# Patient Record
Sex: FEMALE | Race: WHITE | NOT HISPANIC OR LATINO | Employment: FULL TIME | URBAN - METROPOLITAN AREA
[De-identification: names, ages, dates, MRNs, and addresses within clinical notes are randomized per-mention and may not be internally consistent; named-entity substitution may affect disease eponyms.]

---

## 2017-03-16 ENCOUNTER — OFFICE VISIT (OUTPATIENT)
Dept: URGENT CARE | Facility: CLINIC | Age: 37
End: 2017-03-16
Payer: COMMERCIAL

## 2017-03-16 VITALS
WEIGHT: 183 LBS | OXYGEN SATURATION: 98 % | HEART RATE: 80 BPM | DIASTOLIC BLOOD PRESSURE: 80 MMHG | TEMPERATURE: 98.5 F | BODY MASS INDEX: 34.55 KG/M2 | SYSTOLIC BLOOD PRESSURE: 110 MMHG | HEIGHT: 61 IN

## 2017-03-16 DIAGNOSIS — J02.9 PHARYNGITIS, UNSPECIFIED ETIOLOGY: ICD-10-CM

## 2017-03-16 DIAGNOSIS — J06.9 UPPER RESPIRATORY TRACT INFECTION, UNSPECIFIED TYPE: ICD-10-CM

## 2017-03-16 DIAGNOSIS — R05.9 COUGH: ICD-10-CM

## 2017-03-16 LAB
INT CON NEG: NEGATIVE
INT CON POS: POSITIVE
S PYO AG THROAT QL: NEGATIVE

## 2017-03-16 PROCEDURE — 99214 OFFICE O/P EST MOD 30 MIN: CPT | Performed by: PHYSICIAN ASSISTANT

## 2017-03-16 PROCEDURE — 87880 STREP A ASSAY W/OPTIC: CPT | Performed by: PHYSICIAN ASSISTANT

## 2017-03-16 RX ORDER — PROMETHAZINE HYDROCHLORIDE AND CODEINE PHOSPHATE 6.25; 1 MG/5ML; MG/5ML
5 SYRUP ORAL EVERY 12 HOURS PRN
Qty: 120 ML | Refills: 0 | Status: SHIPPED | OUTPATIENT
Start: 2017-03-16 | End: 2018-02-22

## 2017-03-16 ASSESSMENT — ENCOUNTER SYMPTOMS
DIARRHEA: 0
SHORTNESS OF BREATH: 0
ABDOMINAL PAIN: 0
FEVER: 0
SPUTUM PRODUCTION: 0
CHILLS: 0
SORE THROAT: 1
NAUSEA: 0
WHEEZING: 0
VOMITING: 0
COUGH: 1

## 2017-03-16 NOTE — PROGRESS NOTES
Subjective:      Bree Ggae is a 36 y.o. female who presents with Sore Throat            HPI  2wks ago w/ strep, dx'ed by rash then swab at allergist, tx'ed w/ augmentin x 5-7d, notes finished augmentin ~12d ago, denies having had sorehtroat back then, resolved rash, notes last 6d of sorethroat -very bad sorethroat, swollen nodes, cough last 3d dry cough, c/o sinus press and green purulent drainage, denies ear pain, c/o pressure to ears, c/o nausea, some emesis last week, denies nausea/voimgting, poor appetitie, ~8yrs ago w/ strep, PMH of pneumonia/bronchitis - 8yrs ago, does have seasonal allerg - takes zyrtec daily. Qvar. Tried naproxen/tylenol/sudafed/guafinesin.       Review of Systems   Constitutional: Negative for fever and chills.   HENT: Positive for congestion, ear pain and sore throat.    Respiratory: Positive for cough. Negative for sputum production, shortness of breath and wheezing.    Gastrointestinal: Negative for nausea ( resolved), vomiting, abdominal pain and diarrhea.   Skin: Negative for rash.   Endo/Heme/Allergies: Positive for environmental allergies.       PMH:  has a past medical history of Migraine; Fracture of finger; Hemorrhoids; Rectal fissure; Varicose veins; Hyperlipidemia; Allergy; Iron deficiency anemia; IBS (irritable bowel syndrome); Alcohol addiction; Drug addiction; EBV positive mononucleosis syndrome; Hypothyroidism; Depression; Angina (last 2 years); Bronchitis; Indigestion; Arthritis (current); and LBP (low back pain). She also has no past medical history of Unspecified hemorrhagic conditions, Personal history of venous thrombosis and embolism, Cancer, Diabetes, Glaucoma, CATARACT, Stroke, Seizure, Congestive heart failure, Myocardial infarct, Hypertension, Arrhythmia, Pacemaker, Heart valve disease, Heart murmur, Rheumatic fever, Pneumonia, COPD, Jaundice, Unspecified urinary incontinence, Renal disorder, Dialysis, or Other specified symptom associated with female  genital organs.  MEDS:   Current outpatient prescriptions:   •  fluoxetine (PROZAC) 40 MG capsule, Take 1 Cap by mouth every day., Disp: 30 Cap, Rfl: 3  •  acetaminophen (TYLENOL) 500 MG TABS, Take 500-1,000 mg by mouth every 6 hours as needed. Up to 8 a day, Disp: , Rfl:   •  ondansetron (ZOFRAN ODT) 4 MG TBDP, Take 0.5 Tabs by mouth every 6 hours as needed for Nausea/Vomiting., Disp: 5 Each, Rfl: 2  •  cetirizine (ZYRTEC) 10 MG TABS, Take 10 mg by mouth every day.  , Disp: , Rfl:   •  busPIRone (BUSPAR) 10 MG TABS, Take 1 Tab by mouth 2 times a day., Disp: 60 Tab, Rfl: 3  •  aripiprazole (ABILIFY) 2 MG tablet, Take 1 Tab by mouth every day., Disp: 30 Tab, Rfl: 3  •  ondansetron (ZOFRAN ODT) 4 MG TBDP, Take 1 Tab by mouth every 8 hours as needed for Nausea/Vomiting., Disp: 12 Tab, Rfl: 0  •  tramadol (ULTRAM) 50 MG TABS, Take 1-2 Tabs by mouth every 6 hours as needed for Severe Pain., Disp: 30 Tab, Rfl: 0  •  gabapentin (NEURONTIN) 100 MG CAPS, Take 1 tab PO TID x 3 days then increase to 2 tabs PO TID x 3 days then increase to 3 tabs PO TID and remain on that dose as long as you are tolerating the medication., Disp: 90 Cap, Rfl: 3  •  hydrocodone-acetaminophen (NORCO) 5-325 MG TABS per tablet, Take 1-2 Tabs by mouth every four hours as needed., Disp: 30 Tab, Rfl: 0  •  hydrocodone/acetaminophen (NORCO)  MG TABS, Take 1-2 Tabs by mouth every 6 hours as needed., Disp: , Rfl:   •  SUMAVEL DOSEPRO 6 MG/0.5ML EMMA, Inject 1 Syringe as instructed as needed (Take at onset of headache onset. Okay to repeat if headache persists in 2 hours. DO NOT take more than 2 doses in 1 day.)., Disp: 11 Syringe, Rfl: 6  •  indomethacin (INDOCIN) 50 MG CAPS, Take 1 Cap by mouth 3 times a day, with meals., Disp: 90 Cap, Rfl: 6  •  nitrofurantoin monohydr macro (MACROBID) 100 MG CAPS, Take 100 mg by mouth 2 times a day., Disp: , Rfl:   •  naproxen (NAPROSYN) 500 MG TABS, Take 500 mg by mouth as needed., Disp: , Rfl:   •  pregabalin  "(LYRICA) 75 MG CAPS, Take 2 Caps by mouth 2 times a day., Disp: 60 Cap, Rfl: 11  •  promethazine (PHENERGAN) 25 MG TABS, Take 1 Tab by mouth every 6 hours as needed for Nausea/Vomiting., Disp: 10 Tab, Rfl: 0  ALLERGIES:   Allergies   Allergen Reactions   • Ciprofloxacin    • Diclofenac      Upset     • Epinephrine      palpitations   • Latex      SURGHX:   Past Surgical History   Procedure Laterality Date   • Tracheostomy  10/29/2009     Performed by NIDHI LARA at SURGERY [facility]   • Gastrostomy laparoscopic  10/29/2009     Performed by NIDHI LARA at SURGERY [facility]     SOCHX:  reports that she has been smoking Cigarettes.  She has a 6.5 pack-year smoking history. She does not have any smokeless tobacco history on file. She reports that she uses illicit drugs (Methamphetamines). She reports that she does not drink alcohol.  FH: Family history was reviewed, no pertinent findings to report    I have worn a mask for the entire encounter with this patient.      Objective:     /80 mmHg  Pulse 80  Temp(Src) 36.9 °C (98.5 °F)  Ht 1.549 m (5' 0.98\")  Wt 83.008 kg (183 lb)  BMI 34.60 kg/m2  SpO2 98%     Physical Exam   Constitutional: She is oriented to person, place, and time. She appears well-developed and well-nourished. No distress.   HENT:   Head: Normocephalic and atraumatic.   Right Ear: Tympanic membrane, external ear and ear canal normal.   Left Ear: Tympanic membrane, external ear and ear canal normal.   Nose: Right sinus exhibits maxillary sinus tenderness. Right sinus exhibits no frontal sinus tenderness. Left sinus exhibits maxillary sinus tenderness. Left sinus exhibits no frontal sinus tenderness.   Mouth/Throat: Uvula is midline and mucous membranes are normal. Posterior oropharyngeal edema ( trace bilat) and posterior oropharyngeal erythema ( mild PND) present. No oropharyngeal exudate or tonsillar abscesses.   Eyes: Conjunctivae and lids are normal. Right eye " exhibits no discharge. Left eye exhibits no discharge. No scleral icterus.   Neck: Neck supple.   Pulmonary/Chest: Effort normal and breath sounds normal. No respiratory distress. She has no decreased breath sounds. She has no wheezes. She has no rhonchi. She has no rales.   Musculoskeletal: Normal range of motion.   Lymphadenopathy:     She has cervical adenopathy ( mild bilat).   Neurological: She is alert and oriented to person, place, and time. She is not disoriented.   Skin: Skin is warm and dry. She is not diaphoretic. No erythema. No pallor.   Psychiatric: Her speech is normal and behavior is normal.   Nursing note and vitals reviewed.         POCT strep - NEG     Assessment/Plan:   1. Upper respiratory tract infection, unspecified type  Supportive care is reviewed with patient/caregiver - recommend to push PO fluids and electrolytes, Nsaids/tylenol, netti pot/saline irrig, humidifier in home, pt declines throat numbing medication, we discuss OTC options, cough suppressant and work note provided  Cautioned regarding potential for sedation with medication.  Return to clinic with lack of resolution or progression of symptoms.      2. Cough    - POCT Rapid Strep A  - promethazine-codeine (PHENERGAN-CODEINE) 6.25-10 MG/5ML Syrup; Take 5 mL by mouth every 12 hours as needed.  Dispense: 120 mL; Refill: 0    3. Pharyngitis, unspecified etiology

## 2017-03-16 NOTE — Clinical Note
March 16, 2017       Patient: Bree Gage   YOB: 1980   Date of Visit: 3/16/2017         To Whom It May Concern:    It is my medical opinion that Bree Gage should be excused from work for Monday, Tuesday, Wednesday and today due to illness. She is now permitted to return back to work.      If you have any questions or concerns, please don't hesitate to call [phone]          Sincerely,          Aric Landaverde PA-C  Electronically Signed

## 2017-03-16 NOTE — MR AVS SNAPSHOT
"        Bree Schaeffer Too   3/16/2017 8:15 AM   Office Visit   MRN: [id number]    Department:  [facility]   Dept Phone:  [phone]    Description:  Female : 1980   Provider:  Aric Landaverde PA-C           Reason for Visit     Sore Throat fevers,L swollen tonsil,white patches,green mucus,cough, x1week       Allergies as of 3/16/2017     Allergen Noted Reactions    Ciprofloxacin 2010       Diclofenac 2009       Upset      Epinephrine 2009       palpitations    Latex 2009         You were diagnosed with     Upper respiratory tract infection, unspecified type   [7555306]       Cough   [786.2.ICD-9-CM]       Pharyngitis, unspecified etiology   [3346297]         Vital Signs     Blood Pressure Pulse Temperature Height Weight Body Mass Index    110/80 mmHg 80 36.9 °C (98.5 °F) 1.549 m (5' 0.98\") 83.008 kg (183 lb) 34.60 kg/m2    Oxygen Saturation Smoking Status                98% Current Every Day Smoker          Basic Information     Date Of Birth Sex Race Ethnicity Preferred Language    1980 Female White Non- English      Problem List              ICD-10-CM Priority Class Noted - Resolved    Fatigue R53.83   9/3/2009 - Present    Preventative health care Z00.00   9/3/2009 - Present    IBS (irritable bowel syndrome) K58.9   Unknown - Present    Migraine G43.909   Unknown - Present    Alcohol addiction (CMS-HCC) F10.20   Unknown - Present    Drug addiction (CMS-[facility]) F19.20   Unknown - Present    Fracture of finger S62.609A   Unknown - Present    Hypothyroidism E03.9   Unknown - Present    Hemorrhoids K64.9   Unknown - Present    Rectal fissure K60.2   Unknown - Present    Varicose veins I86.8   Unknown - Present    Iron deficiency anemia D50.9   Unknown - Present    Hyperlipidemia E78.5   Unknown - Present    Low back pain M54.5   Unknown - Present    EBV positive mononucleosis syndrome B27.00   Unknown - Present    Viral syndrome B34.9   10/20/2009 - Present   " URI (upper respiratory infection) J06.9   4/7/2010 - Present    Migraine    4/22/2010 - Present    Shingles B02.9   7/6/2010 - Present    Nausea, vomiting and diarrhea R11.2, R19.7   10/7/2010 - Present    Hyperprolactinemia (CMS-HCC) E22.1   2/13/2013 - Present      Health Maintenance        Date Due Completion Dates    IMM DTaP/Tdap/Td Vaccine (1 - Tdap) 12/1/2000 ---    PAP SMEAR 12/1/2002 ---    IMM INFLUENZA (1) 9/1/2016 11/8/2010, 10/7/2009            Results     POCT Rapid Strep A      Component    Rapid Strep Screen    Negative    Internal Control Positive    Positive    Internal Control Negative    Negative                        Current Immunizations     Influenza TIV (IM) 10/7/2009  4:00 PM    Influenza Vaccine Pediatric 11/8/2010    Pneumococcal polysaccharide vaccine (PPSV-23) 11/8/2010    Tuberculin Skin Test 3/4/2014  1:35 PM, 1/3/2014      Below and/or attached are the medications your provider expects you to take. Review all of your home medications and newly ordered medications with your provider and/or pharmacist. Follow medication instructions as directed by your provider and/or pharmacist. Please keep your medication list with you and share with your provider. Update the information when medications are discontinued, doses are changed, or new medications (including over-the-counter products) are added; and carry medication information at all times in the event of emergency situations     Allergies:  CIPROFLOXACIN - (reactions not documented)     DICLOFENAC - (reactions not documented)     EPINEPHRINE - (reactions not documented)     LATEX - (reactions not documented)               Medications  Valid as of: March 16, 2017 - 10:02 AM    Generic Name Brand Name Tablet Size Instructions for use    Acetaminophen (Tab) TYLENOL 500 MG Take 500-1,000 mg by mouth every 6 hours as needed. Up to 8 a day        ARIPiprazole (Tab) ABILIFY 2 MG Take 1 Tab by mouth every day.        BusPIRone HCl (Tab) BUSPAR  10 MG Take 1 Tab by mouth 2 times a day.        Cetirizine HCl (Tab) ZYRTEC 10 MG Take 10 mg by mouth every day.          FLUoxetine HCl (Cap) PROZAC 40 MG Take 1 Cap by mouth every day.        Gabapentin (Cap) NEURONTIN 100 MG Take 1 tab PO TID x 3 days then increase to 2 tabs PO TID x 3 days then increase to 3 tabs PO TID and remain on that dose as long as you are tolerating the medication.        Hydrocodone-Acetaminophen (Tab) NORCO  MG Take 1-2 Tabs by mouth every 6 hours as needed.        Hydrocodone-Acetaminophen (Tab) NORCO 5-325 MG Take 1-2 Tabs by mouth every four hours as needed.        Indomethacin (Cap) INDOCIN 50 MG Take 1 Cap by mouth 3 times a day, with meals.        Naproxen (Tab) NAPROSYN 500 MG Take 500 mg by mouth as needed.        Nitrofurantoin Monohyd Macro (Cap) MACROBID 100 MG Take 100 mg by mouth 2 times a day.        Ondansetron (TABLET DISPERSIBLE) ZOFRAN ODT 4 MG Take 0.5 Tabs by mouth every 6 hours as needed for Nausea/Vomiting.        Ondansetron (TABLET DISPERSIBLE) ZOFRAN ODT 4 MG Take 1 Tab by mouth every 8 hours as needed for Nausea/Vomiting.        Pregabalin (Cap) LYRICA 75 MG Take 2 Caps by mouth 2 times a day.        Promethazine HCl (Tab) PHENERGAN 25 MG Take 1 Tab by mouth every 6 hours as needed for Nausea/Vomiting.        Promethazine-Codeine (Syrup) PHENERGAN-CODEINE 6.25-10 MG/5ML Take 5 mL by mouth every 12 hours as needed.        SUMAtriptan Succinate (Device) SUMAVEL DOSEPRO 6 MG/0.5ML Inject 1 Syringe as instructed as needed (Take at onset of headache onset. Okay to repeat if headache persists in 2 hours. DO NOT take more than 2 doses in 1 day.).        TraMADol HCl (Tab) ULTRAM 50 MG Take 1-2 Tabs by mouth every 6 hours as needed for Severe Pain.        .                 Medicines prescribed today were sent to:     [id number] - [place], [place] - [address] [place] [address]    Phone: [phone] Fax:  [phone]    Open 24 Hours?: No      Medication refill instructions:       If your prescription bottle indicates you have medication refills left, it is not necessary to call your provider’s office. Please contact your pharmacy and they will refill your medication.    If your prescription bottle indicates you do not have any refills left, you may request refills at any time through one of the following ways: The online [employer] system (except Urgent Care), by calling your provider’s office, or by asking your pharmacy to contact your provider’s office with a refill request. Medication refills are processed only during regular business hours and may not be available until the next business day. Your provider may request additional information or to have a follow-up visit with you prior to refilling your medication.   *Please Note: Medication refills are assigned a new Rx number when refilled electronically. Your pharmacy may indicate that no refills were authorized even though a new prescription for the same medication is available at the pharmacy. Please request the medicine by name with the pharmacy before contacting your provider for a refill.           [employer] Access Code: [email or web address]  Expires: 4/15/2017 10:02 AM    [employer]  A secure, online tool to manage your health information     [employer]® is a secure, online tool that connects you to your personalized health information from the privacy of your home -- day or night - making it very easy for you to manage your healthcare. Once the activation process is completed, you can even access your medical information using the [employer] ryann, which is available for free in the Apple Ryann store or Google Play store.     [employer] provides the following levels of access (as shown below):   My Chart Features   [facility] Doctor [facility]  Specialists [facility]  Urgent  Care Non-[facility]  Primary Care  Doctor   Email your healthcare team securely and  privately 24/7 X X X    Manage appointments: schedule your next appointment; view details of past/upcoming appointments X      Request prescription refills. X      View recent personal medical records, including lab and immunizations X X X X   View health record, including health history, allergies, medications X X X X   Read reports about your outpatient visits, procedures, consult and ER notes X X X X   See your discharge summary, which is a recap of your hospital and/or ER visit that includes your diagnosis, lab results, and care plan. X X       How to register for [employer]:  1. Go to  [email or web address].  2. Click on the Sign Up Now box, which takes you to the New Member Sign Up page. You will need to provide the following information:  a. Enter your [employer] Access Code exactly as it appears at the top of this page. (You will not need to use this code after you’ve completed the sign-up process. If you do not sign up before the expiration date, you must request a new code.)   b. Enter your date of birth.   c. Enter your home email address.   d. Click Submit, and follow the next screen’s instructions.  3. Create a [employer] ID. This will be your [employer] login ID and cannot be changed, so think of one that is secure and easy to remember.  4. Create a [employer] password. You can change your password at any time.  5. Enter your Password Reset Question and Answer. This can be used at a later time if you forget your password.   6. Enter your e-mail address. This allows you to receive e-mail notifications when new information is available in [employer].  7. Click Sign Up. You can now view your health information.    For assistance activating your [employer] account, call [phone]

## 2017-10-11 ENCOUNTER — OFFICE VISIT (OUTPATIENT)
Dept: NEUROLOGY | Facility: MEDICAL CENTER | Age: 37
End: 2017-10-11
Payer: COMMERCIAL

## 2017-10-11 VITALS
OXYGEN SATURATION: 97 % | WEIGHT: 186.2 LBS | BODY MASS INDEX: 35.16 KG/M2 | SYSTOLIC BLOOD PRESSURE: 116 MMHG | HEIGHT: 61 IN | DIASTOLIC BLOOD PRESSURE: 78 MMHG | HEART RATE: 72 BPM | TEMPERATURE: 99.1 F

## 2017-10-11 DIAGNOSIS — R94.01 EEG ABNORMAL: ICD-10-CM

## 2017-10-11 DIAGNOSIS — G43.119 INTRACTABLE MIGRAINE WITH AURA WITHOUT STATUS MIGRAINOSUS: ICD-10-CM

## 2017-10-11 PROCEDURE — 99205 OFFICE O/P NEW HI 60 MIN: CPT | Performed by: PHYSICIAN ASSISTANT

## 2017-10-11 RX ORDER — AZELASTINE 1 MG/ML
SPRAY, METERED NASAL
COMMUNITY
Start: 2017-09-06 | End: 2021-03-04

## 2017-10-11 RX ORDER — OXYCODONE AND ACETAMINOPHEN 10; 325 MG/1; MG/1
TABLET ORAL
COMMUNITY
End: 2018-06-22

## 2017-10-11 RX ORDER — SUMATRIPTAN 50 MG/1
TABLET, FILM COATED ORAL
COMMUNITY
Start: 2017-09-06 | End: 2017-11-30

## 2017-10-11 RX ORDER — OXYCODONE HYDROCHLORIDE AND ACETAMINOPHEN 5; 325 MG/1; MG/1
TABLET ORAL
COMMUNITY
Start: 2017-09-11 | End: 2018-06-22

## 2017-10-11 RX ORDER — FLUTICASONE PROPIONATE 50 MCG
1 SPRAY, SUSPENSION (ML) NASAL
COMMUNITY
End: 2019-04-17

## 2017-10-11 RX ORDER — HYDROXYZINE HYDROCHLORIDE 25 MG/1
25 TABLET, FILM COATED ORAL
COMMUNITY
End: 2019-04-17

## 2017-10-11 RX ORDER — METHOCARBAMOL 500 MG/1
TABLET, FILM COATED ORAL
COMMUNITY
Start: 2017-10-09 | End: 2019-08-14

## 2017-10-11 RX ORDER — SUMATRIPTAN 50 MG/1
50 TABLET, FILM COATED ORAL
COMMUNITY
End: 2017-11-30

## 2017-10-11 RX ORDER — MONTELUKAST SODIUM 10 MG/1
4 TABLET ORAL
COMMUNITY
End: 2020-07-20 | Stop reason: SDUPTHER

## 2017-10-11 RX ORDER — HYDROXYZINE HYDROCHLORIDE 25 MG/1
TABLET, FILM COATED ORAL
COMMUNITY
Start: 2017-09-26 | End: 2018-06-22

## 2017-10-11 RX ORDER — BUTALBITAL, ACETAMINOPHEN AND CAFFEINE 50; 325; 40 MG/1; MG/1; MG/1
CAPSULE ORAL
COMMUNITY
Start: 2017-09-13 | End: 2020-04-17

## 2017-10-11 RX ORDER — VENLAFAXINE HYDROCHLORIDE 37.5 MG/1
37.5 CAPSULE, EXTENDED RELEASE ORAL DAILY
Qty: 30 CAP | Refills: 3 | Status: SHIPPED | OUTPATIENT
Start: 2017-10-11 | End: 2018-02-17 | Stop reason: SDUPTHER

## 2017-10-11 RX ORDER — METHOCARBAMOL 500 MG/1
500 TABLET, FILM COATED ORAL
COMMUNITY
End: 2019-04-17

## 2017-10-11 RX ORDER — BACLOFEN 20 MG
TABLET ORAL
COMMUNITY
End: 2019-04-17

## 2017-10-11 RX ORDER — MONTELUKAST SODIUM 10 MG/1
TABLET ORAL
COMMUNITY
Start: 2017-09-24 | End: 2019-04-17

## 2017-10-11 RX ORDER — MODAFINIL 200 MG/1
TABLET ORAL
COMMUNITY
Start: 2017-09-13 | End: 2019-09-20

## 2017-10-11 RX ORDER — ONDANSETRON 4 MG/1
2 TABLET, ORALLY DISINTEGRATING ORAL
COMMUNITY
Start: 2013-06-02 | End: 2018-06-22

## 2017-10-11 RX ORDER — METOCLOPRAMIDE 10 MG/1
TABLET ORAL
COMMUNITY
Start: 2017-09-19 | End: 2018-02-22

## 2017-10-11 RX ORDER — METOCLOPRAMIDE 5 MG/1
5 TABLET ORAL
COMMUNITY
End: 2018-02-22

## 2017-10-11 RX ORDER — NAPROXEN 500 MG/1
500 TABLET ORAL
COMMUNITY
End: 2018-06-22

## 2017-10-11 RX ORDER — BUSPIRONE HYDROCHLORIDE 10 MG/1
10 TABLET ORAL
COMMUNITY
Start: 2017-09-08 | End: 2018-02-22

## 2017-10-11 RX ORDER — MODAFINIL 200 MG/1
200 TABLET ORAL
COMMUNITY
End: 2018-05-23

## 2017-10-11 ASSESSMENT — PATIENT HEALTH QUESTIONNAIRE - PHQ9
SUM OF ALL RESPONSES TO PHQ QUESTIONS 1-9: 12
CLINICAL INTERPRETATION OF PHQ2 SCORE: 4
5. POOR APPETITE OR OVEREATING: 0 - NOT AT ALL

## 2017-10-11 NOTE — PATIENT INSTRUCTIONS
"Acute/Rescue Medications: max use 9 days monthly - use calendar to track and bring to next visit    Triptan - zembrace 3 mg referral to specialty pharmacy (cambia as an alternative, ergot, migranol nasal spray)  Nausea medication - have this already  Topical medicine - specialty pharmacy will contact you      Daily Preventative Treatment:  Vitamins - handout provided  Daily medicine -  effexor   Exercise program: begin yoga and warm water exercise at least 3 times weekly    Whole 30 diet:  Website, weeSPIN, whole30 book, book \"it starts with food\".      Referral for acupuncture/physical therapy neck exercises - [facility] handout provided    [employer] handout provided    Get dilated eye exam    Get a copy of the most recent MRI brain.  I will order MRI brain and neck if there isn't a brain mri within the last 3 years    I will order an EEG and they will call you to schedule.    Return in 4 weeks.      "

## 2017-10-11 NOTE — PROGRESS NOTES
Subjective:      Bree Gage is a 36 y.o. female who presents with Follow-Up (NU2U Migraine )    Chief Complaint/Reason for referral:  headaches    History of Present Illness:   Describe your headaches to me:    Migraines diagnosed age 17.  Moderate to severe intensity, lasting greater 4 hours treated or untreated, worsened with activity.  Headaches come on with visual changes followed by abrupt onset of vomiting.  She has hx of being put on keppra with EEG when younger - unclear results.    Left temple and eye area typically.   Stabbing and burning pain.      She was treated by Jaquelin Stephens and Dr. Giovanny madison at [facility] but developed anaphylactic response to botox      Do you get an aura or any symptoms that typically begin PRIOR to headache onset? qiana right arm numbness and tingling.  Dropping items and slurring speech which also occurs during the headaches    Are you nauseated or sick to your stomach when you have a headache?  y  Does light bother you when you have a headache?   ____y_____  Does sound or noise bother you when you have a headache?   __y_______    Neurologic symptoms with headaches (weakness, numbness, vertigo, speech changes, cognitive changes) yes right arm weakness.  She has been to the ER 6 times over past year.    Do you have any neck or jaw pain with headaches?  Neck injuries none  TMJ is being treated by a dentist in [place].  Will get  after sleep apnea evaluation.    Have you identified any triggers for your headaches (dehydration, poor sleep, low blood sugar, alcohol 35% (katty wine) chocolate 22%, cheese 9%, citrus fruit 11%)?  Smoke, citrus, aspartame, nitrates, stress, alcohol    Do you feel restless like you want to pace around with your headaches or do you feel like lying down to make your headache feel better/less severe? Lie down    Do headaches start by coughing, sneezing, bending over, Valsalva maneuver, sexual activity?  yes    Do headaches start shortly  after you lie down to go to bed or shortly after you get up from bed in the morning?  Worse in the afternoons typically, wakes a lot with headaches but they do come on at random times    Have you noticed a menstrual pattern to your headaches?  BC does not allow any periods.  She tried for a while to use birth control to control migraines and it didn't work    Have you ever kept a headache diary? No     How many days do you keep ANY type of headache in any given month?  3 or fewer days______   Between 3 and 6 days______   Between 6 and 10 days_____  Between 11 and 14 days____  15 or more headache/days per month__X___  Has severe headaches __20__ days per month    Family members with headaches: paternal aunt, mom    Co--morbid conditions:    Conditions that affect diagnosis and treatment:  Depression  Yes now and in the past.  She is going to see psychiatry and psychology for this as it is not well controlled at this time.  She does take buspar.       Anxiety     Yes - needs better treated just like depression       Sleep disorders:   No but she doesn't feel like she gets proper sleep.  Sleep study is pending      Obesity  Yes.    Dilated eye exam  > 4 years ago    History of TBI N            Pregnancy/family planning   N  Counseled on teratogenicity of migraine medications.  Patient verbalized their understanding.    Fibromyalgia   Yes - tried and failed everything except the naproxen and oxycodone from her pain management doctor    Social History:  Do you drink any caffeine? Yes_X____ No____   How many days per week?  2 or fewer days______  3 or more days__X____    Do you drink alcohol?  Denies rare 1-2 glasses of wine    Do you use recreational drugs including medicinal marijuana? none    Do you smoke cigarettes? None.  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  She is now being told if she calls in sick again she will be fired.      Who  and where do you live?  [place] herself    What is your exercise program:  None.  Warm water therapy is going to be starting soon.      Have you had an MRI done?  2012 MRI brain was done and she had a pituitary abnormality.  She did a prolactin test which was high.  She had this treated back then and was told it was OK.  Her last MRI was in the last 3 years and it was OK.         PMH reviewed     Medications and Allergies Reviewed     What are you taking right now for your headaches/#days per month of acute medication use:     Naproxen 500 mg TID for fibromyalgia and headache  zofran 4 mg and reglan taken for nausea with migraines  Fioricet and oxycodone also for migraine  Sumatriptan 50 mg - gets bad chest pains with it.      Prior acute treatments:  Medication/dose/timing/route/worked or side-effects?    sumavel dosepro - she liked that.    Indomethacin - no change to headache.  This was in 2013.    What are you taking right now - daily - to prevent headaches?/dose    Magnesium malate 1500 mg daily    Any prior prophylactic treatments:  Medications/dose/frequency/duration of treatment/worked or side effects?    topamax tried twice and failed 12.5 mg daily  Has asthma so no beta blockers  botox was tried and failed  propranol tried and failed  Verapamil tried and failed and felt light headed  Occipital nerve blocks only work for 6 hours  neurontin tried and failed  No ace inhibitor due to low blood pressure  midrin tried and failed too  Tried amitripylene - cannot remember dose  Venlafaxine - used for depression but it didn't work as well as as prozac  ? If carbamazepine was ever tried  Feverfew - gave her diarrhea.      Never tried namenda, never tried doxepin, nortriptyline, protriptyline, zonisamide, candesartan ,                                                                                                                                     HPI    ROS       Objective:     /78   Pulse 72   Temp 37.3  "°C (99.1 °F)   Ht 1.549 m (5' 0.98\")   Wt 84.5 kg (186 lb 3.2 oz)   SpO2 97%   BMI 35.20 kg/m²      Physical Exam    Well developed, well nourished - vital signs reviewed  Alert and Oriented x 3, Affect Appropriate, Fund of knowledge within normal limits, Memory intact  Cranial Nerves: PERRL, EOMI without nystagmus or opthalmoplegia, VFF,  face symmetric in strength and sensation, no facial droop, tongue midline without atrophy or fasiculations, shoulder shrug is normal bilaterally, speech clear and fluent no aphasia  Motor:  Upper and lower extremities 5/5, equal bilaterally.  No drift.  Sensation: Light touch equal and intact in all extremities, No sensory deficits  DTRs: Normal in both upper and lower extremity.  No spasticity.  Cerebellar:  Finger to nose intact.  No dysmetria.  No tremor.  Gait: normal gait without ataxia.  Negative Romberg            Assessment/Plan:     Chronic Migraine/neck and right arm pain and paresthesia/medication overuse headache/migraine with aura intractable    Acute/Rescue Medications: max use 9 days monthly - use calendar to track and bring to next visit    Triptan - zembrace 3 mg referral to specialty pharmacy (cambia as an alternative, ergot, migranol nasal spray)  Nausea medication - have this already  Topical medicine - specialty pharmacy will contact you      Daily Preventative Treatment:  Vitamins - handout provided  Daily medicine -  effexor   Exercise program: begin yoga and warm water exercise at least 3 times weekly    Whole 30 diet:  Website, [employer], whole30 book, book \"it starts with food\".      Referral for acupuncture/physical therapy neck exercises - [facility] PT handout provided    [employer] handout provided    Get dilated eye exam    Get a copy of the most recent MRI brain.  I will order MRI brain and neck if there isn't a brain mri within the last 3 years.  Pt to mychart me if she can find it.    ONB authorization will be applied for with intent to also " inject supraorbital and trigeminal nerves on left side.    I will order an EEG and they will call you to schedule.  Pt has hx of AED and remote hx of EEg with uncertain result if normal.    Total time with this visit:   60  Minutes face-to-face with patient. More than 50% of this visit was spent educating patient on their illness and/or coordinating care, as detailed above    Return in 4 weeks.

## 2017-10-16 DIAGNOSIS — Z87.898 H/O: PITUITARY TUMOR: ICD-10-CM

## 2017-10-16 DIAGNOSIS — R20.2 ARM PARESTHESIA, RIGHT: ICD-10-CM

## 2017-10-25 DIAGNOSIS — G43.009 MIGRAINE WITHOUT AURA AND WITHOUT STATUS MIGRAINOSUS, NOT INTRACTABLE: ICD-10-CM

## 2017-10-25 RX ORDER — DIHYDROERGOTAMINE MESYLATE 4 MG/ML
SPRAY NASAL
Qty: 8 AMPULE | Refills: 6 | Status: SHIPPED | OUTPATIENT
Start: 2017-10-25 | End: 2017-11-15

## 2017-10-27 ENCOUNTER — HOSPITAL ENCOUNTER (OUTPATIENT)
Dept: RADIOLOGY | Facility: MEDICAL CENTER | Age: 37
End: 2017-10-27
Attending: PHYSICIAN ASSISTANT
Payer: COMMERCIAL

## 2017-10-27 DIAGNOSIS — Z87.898 H/O: PITUITARY TUMOR: ICD-10-CM

## 2017-10-27 DIAGNOSIS — R20.2 ARM PARESTHESIA, RIGHT: ICD-10-CM

## 2017-10-27 PROCEDURE — A9579 GAD-BASE MR CONTRAST NOS,1ML: HCPCS | Performed by: PHYSICIAN ASSISTANT

## 2017-10-27 PROCEDURE — 700117 HCHG RX CONTRAST REV CODE 255: Performed by: PHYSICIAN ASSISTANT

## 2017-10-27 PROCEDURE — 72141 MRI NECK SPINE W/O DYE: CPT

## 2017-10-27 PROCEDURE — 70553 MRI BRAIN STEM W/O & W/DYE: CPT

## 2017-10-27 RX ADMIN — GADODIAMIDE 20 ML: 287 INJECTION INTRAVENOUS at 10:20

## 2017-11-30 ENCOUNTER — OFFICE VISIT (OUTPATIENT)
Dept: NEUROLOGY | Facility: MEDICAL CENTER | Age: 37
End: 2017-11-30
Payer: COMMERCIAL

## 2017-11-30 VITALS
DIASTOLIC BLOOD PRESSURE: 74 MMHG | BODY MASS INDEX: 34.7 KG/M2 | WEIGHT: 183.8 LBS | OXYGEN SATURATION: 98 % | HEIGHT: 61 IN | HEART RATE: 82 BPM | SYSTOLIC BLOOD PRESSURE: 112 MMHG | TEMPERATURE: 99 F

## 2017-11-30 DIAGNOSIS — G43.911 INTRACTABLE MIGRAINE WITH STATUS MIGRAINOSUS, UNSPECIFIED MIGRAINE TYPE: ICD-10-CM

## 2017-11-30 DIAGNOSIS — G43.119 INTRACTABLE MIGRAINE WITH AURA WITHOUT STATUS MIGRAINOSUS: ICD-10-CM

## 2017-11-30 PROCEDURE — 99215 OFFICE O/P EST HI 40 MIN: CPT | Performed by: PHYSICIAN ASSISTANT

## 2017-11-30 RX ORDER — ERGOTAMINE TARTRATE AND CAFFEINE 1; 100 MG/1; MG/1
1 TABLET, FILM COATED ORAL
Qty: 30 TAB | Refills: 11 | Status: SHIPPED | OUTPATIENT
Start: 2017-11-30 | End: 2018-01-04 | Stop reason: SDUPTHER

## 2017-11-30 RX ORDER — PROMETHAZINE HYDROCHLORIDE 25 MG/1
25 TABLET ORAL EVERY 6 HOURS PRN
Qty: 30 TAB | Refills: 6 | Status: SHIPPED | OUTPATIENT
Start: 2017-11-30 | End: 2019-09-20 | Stop reason: SDUPTHER

## 2017-11-30 ASSESSMENT — PATIENT HEALTH QUESTIONNAIRE - PHQ9
5. POOR APPETITE OR OVEREATING: 0 - NOT AT ALL
SUM OF ALL RESPONSES TO PHQ QUESTIONS 1-9: 7
CLINICAL INTERPRETATION OF PHQ2 SCORE: 2

## 2017-12-01 NOTE — PROGRESS NOTES
"Cc:  Headache followup      Last visit:    How many days do you keep ANY type of headache in any given month?  15 or more headache/days per month__X___  Has severe headaches __20__ days per month    Chronic Migraine/neck and right arm pain and paresthesia/medication overuse headache/migraine with aura intractable     Acute/Rescue Medications: max use 9 days monthly - use calendar to track and bring to next visit     Triptan - zembrace 3 mg referral to specialty pharmacy (cambia as an alternative, ergot, migranol nasal spray) - couldn't get the cafergot and so I reordered with caffeine  Nausea medication - have this already  Topical medicine - specialty pharmacy will contact you - got this and it works.         Daily Preventative Treatment:  Vitamins - handout provided -   Daily medicine -  effexor   Exercise program: begin yoga and warm water exercise at least 3 times weekly -    Whole 30 diet:  Website, [employer], whole30 book, book \"it starts with food\".  -      Referral for acupuncture/physical therapy neck exercises - [facility] PT handout provided -    [employer] handout provided -     Get dilated eye exam -     Get a copy of the most recent MRI brain.  I will order MRI brain and neck if there isn't a brain mri within the last 3 years.  Pt to mychart me if she can find it. - got both done and they were normal.       ONB authorization will be applied for with intent to also inject supraorbital and trigeminal nerves on left side.     I will order an EEG and they will call you to schedule.  Pt has hx of AED and remote hx of EEg with uncertain result if normal.     Interval History:    Review of diary: didn't bring it.  But Continues to have daily headache/migraine.  With an episode of status migrainosis lasting > 3 days with hemiplegic migraine.    Acute treatment: did not like the way the zembrace made her feel.  Wants to stay away from triptans.  Couldn't obtain ergot without caffeine from " pharmacy    Prevention:    Vitamin started: b2 STARTED BUT it takes about 3 months.    Exercise: working on it.  started yoga and a body blade workout system  Diet: beginning to implement whole 30 diet with one meal daily at this time.  Align PT:  couldn't get her in around the work schedule.  To consider acupuncture or PT again after January 1.      Thoughts on mechanical devices:  borrowed the cephaly.  Having a difficult time letting it ramp up and also problem wearing it for the 20 minutes.  Unsure if she will continue    Dilated eye exam:  Dec 11 eye exam.    Status of MRI brain and neck: normal results    ONB authorized:  Will come in for one with next intractable migraine episode    EEG scheduled?  - pt plans to schedule when she can take a day off of work.      A/P:  Chronic migraine/recent episode of status migrainosis with hemiplegic migraine:  couldn't get the cafergot and so I reordered with caffeine but spent considerable time educating patient on caffeine content and that her narcotic from pain management also contains caffeine.  If the ergot helps she will ask pain management for butalbital without caffeine additive  Supplements:  Handout on gliacin as she may not tolerate the B2 as it is bothering her bladder  Pt requested referral to ketamine clinic in [facility].  They don't specifically treat migraine but they do offer treatment for depression and fibromyalgia and she wishes to try.  They have an online website and I made the referral through their online process.  Pt will contact them to follow up.  Concern regarding missed days from [employer] and need for FMLA paperwork.  I gave her the info on policy and she will fax forms to us for me to complete.      Total time with this visit:  40   Minutes face-to-face with patient. More than 50% of this visit was spent educating patient on their illness and/or coordinating care, as detailed above

## 2017-12-19 ENCOUNTER — HOSPITAL ENCOUNTER (EMERGENCY)
Facility: MEDICAL CENTER | Age: 37
End: 2017-12-19
Attending: EMERGENCY MEDICINE
Payer: COMMERCIAL

## 2017-12-19 VITALS
HEIGHT: 61 IN | TEMPERATURE: 97.9 F | RESPIRATION RATE: 18 BRPM | BODY MASS INDEX: 35.5 KG/M2 | HEART RATE: 88 BPM | DIASTOLIC BLOOD PRESSURE: 77 MMHG | WEIGHT: 188.05 LBS | SYSTOLIC BLOOD PRESSURE: 124 MMHG | OXYGEN SATURATION: 94 %

## 2017-12-19 DIAGNOSIS — Z86.69 HISTORY OF MIGRAINE: ICD-10-CM

## 2017-12-19 DIAGNOSIS — R51.9 INTRACTABLE HEADACHE, UNSPECIFIED CHRONICITY PATTERN, UNSPECIFIED HEADACHE TYPE: ICD-10-CM

## 2017-12-19 DIAGNOSIS — R51.9 CHRONIC DAILY HEADACHE: ICD-10-CM

## 2017-12-19 PROCEDURE — 700105 HCHG RX REV CODE 258: Performed by: EMERGENCY MEDICINE

## 2017-12-19 PROCEDURE — 700111 HCHG RX REV CODE 636 W/ 250 OVERRIDE (IP): Performed by: EMERGENCY MEDICINE

## 2017-12-19 PROCEDURE — 96375 TX/PRO/DX INJ NEW DRUG ADDON: CPT

## 2017-12-19 PROCEDURE — 99285 EMERGENCY DEPT VISIT HI MDM: CPT

## 2017-12-19 PROCEDURE — 96374 THER/PROPH/DIAG INJ IV PUSH: CPT

## 2017-12-19 RX ORDER — KETOROLAC TROMETHAMINE 30 MG/ML
30 INJECTION, SOLUTION INTRAMUSCULAR; INTRAVENOUS ONCE
Status: COMPLETED | OUTPATIENT
Start: 2017-12-19 | End: 2017-12-19

## 2017-12-19 RX ORDER — SODIUM CHLORIDE 9 MG/ML
1000 INJECTION, SOLUTION INTRAVENOUS ONCE
Status: COMPLETED | OUTPATIENT
Start: 2017-12-19 | End: 2017-12-19

## 2017-12-19 RX ORDER — DIPHENHYDRAMINE HYDROCHLORIDE 50 MG/ML
25 INJECTION INTRAMUSCULAR; INTRAVENOUS ONCE
Status: COMPLETED | OUTPATIENT
Start: 2017-12-19 | End: 2017-12-19

## 2017-12-19 RX ORDER — DIHYDROERGOTAMINE MESYLATE 1 MG/ML
1 INJECTION, SOLUTION INTRAMUSCULAR; INTRAVENOUS; SUBCUTANEOUS ONCE
Status: COMPLETED | OUTPATIENT
Start: 2017-12-19 | End: 2017-12-19

## 2017-12-19 RX ORDER — DEXAMETHASONE SODIUM PHOSPHATE 4 MG/ML
10 INJECTION, SOLUTION INTRA-ARTICULAR; INTRALESIONAL; INTRAMUSCULAR; INTRAVENOUS; SOFT TISSUE ONCE
Status: COMPLETED | OUTPATIENT
Start: 2017-12-19 | End: 2017-12-19

## 2017-12-19 RX ORDER — SUMATRIPTAN 6 MG/.5ML
6 INJECTION, SOLUTION SUBCUTANEOUS ONCE
Status: DISCONTINUED | OUTPATIENT
Start: 2017-12-19 | End: 2017-12-19

## 2017-12-19 RX ORDER — ONDANSETRON 2 MG/ML
4 INJECTION INTRAMUSCULAR; INTRAVENOUS ONCE
Status: COMPLETED | OUTPATIENT
Start: 2017-12-19 | End: 2017-12-19

## 2017-12-19 RX ADMIN — PROCHLORPERAZINE EDISYLATE 10 MG: 5 INJECTION INTRAMUSCULAR; INTRAVENOUS at 13:22

## 2017-12-19 RX ADMIN — DEXAMETHASONE SODIUM PHOSPHATE 10 MG: 4 INJECTION, SOLUTION INTRAMUSCULAR; INTRAVENOUS at 09:08

## 2017-12-19 RX ADMIN — DIHYDROERGOTAMINE MESYLATE 1 MG: 1 INJECTION, SOLUTION INTRAMUSCULAR; INTRAVENOUS; SUBCUTANEOUS at 10:42

## 2017-12-19 RX ADMIN — KETOROLAC TROMETHAMINE 30 MG: 30 INJECTION, SOLUTION INTRAMUSCULAR at 12:47

## 2017-12-19 RX ADMIN — ONDANSETRON 4 MG: 2 INJECTION INTRAMUSCULAR; INTRAVENOUS at 09:11

## 2017-12-19 RX ADMIN — DIPHENHYDRAMINE HYDROCHLORIDE 25 MG: 50 INJECTION, SOLUTION INTRAMUSCULAR; INTRAVENOUS at 09:10

## 2017-12-19 RX ADMIN — SODIUM CHLORIDE 1000 ML: 9 INJECTION, SOLUTION INTRAVENOUS at 09:08

## 2017-12-19 ASSESSMENT — PAIN SCALES - GENERAL
PAINLEVEL_OUTOF10: 10
PAINLEVEL_OUTOF10: 8

## 2017-12-19 ASSESSMENT — ENCOUNTER SYMPTOMS
SHORTNESS OF BREATH: 0
ABDOMINAL PAIN: 0
NAUSEA: 1
VOMITING: 1
FEVER: 0
HEADACHES: 1
EYE PAIN: 1
PHOTOPHOBIA: 1

## 2017-12-19 NOTE — ED NOTES
"Pt states that she called her neurologist and that they are requesting that we \"try something different.\" requesting ERP call neuro.  "

## 2017-12-19 NOTE — ED NOTES
"Pt reports that DHE did not help with symptoms, states \"I think it made it a little worse.\" ERP aware.  "

## 2017-12-19 NOTE — DISCHARGE INSTRUCTIONS
Migraine Headache  A migraine headache is an intense, throbbing pain on one or both sides of your head. A migraine can last for 30 minutes to several hours.  CAUSES   The exact cause of a migraine headache is not always known. However, a migraine may be caused when nerves in the brain become irritated and release chemicals that cause inflammation. This causes pain.  Certain things may also trigger migraines, such as:  · Alcohol.  · Smoking.  · Stress.  · Menstruation.  · Aged cheeses.  · Foods or drinks that contain nitrates, glutamate, aspartame, or tyramine.  · Lack of sleep.  · Chocolate.  · Caffeine.  · Hunger.  · Physical exertion.  · Fatigue.  · Medicines used to treat chest pain (nitroglycerine), birth control pills, estrogen, and some blood pressure medicines.  SIGNS AND SYMPTOMS  · Pain on one or both sides of your head.  · Pulsating or throbbing pain.  · Severe pain that prevents daily activities.  · Pain that is aggravated by any physical activity.  · Nausea, vomiting, or both.  · Dizziness.  · Pain with exposure to bright lights, loud noises, or activity.  · General sensitivity to bright lights, loud noises, or smells.  Before you get a migraine, you may get warning signs that a migraine is coming (aura). An aura may include:  · Seeing flashing lights.  · Seeing bright spots, halos, or zigzag lines.  · Having tunnel vision or blurred vision.  · Having feelings of numbness or tingling.  · Having trouble talking.  · Having muscle weakness.  DIAGNOSIS   A migraine headache is often diagnosed based on:  · Symptoms.  · Physical exam.  · A CT scan or MRI of your head. These imaging tests cannot diagnose migraines, but they can help rule out other causes of headaches.  TREATMENT  Medicines may be given for pain and nausea. Medicines can also be given to help prevent recurrent migraines.   HOME CARE INSTRUCTIONS  · Only take over-the-counter or prescription medicines for pain or discomfort as directed by your  health care provider. The use of long-term narcotics is not recommended.  · Lie down in a dark, quiet room when you have a migraine.  · Keep a journal to find out what may trigger your migraine headaches. For example, write down:  ¨ What you eat and drink.  ¨ How much sleep you get.  ¨ Any change to your diet or medicines.  · Limit alcohol consumption.  · Quit smoking if you smoke.  · Get 7-9 hours of sleep, or as recommended by your health care provider.  · Limit stress.  · Keep lights dim if bright lights bother you and make your migraines worse.  SEEK IMMEDIATE MEDICAL CARE IF:   · Your migraine becomes severe.  · You have a fever.  · You have a stiff neck.  · You have vision loss.  · You have muscular weakness or loss of muscle control.  · You start losing your balance or have trouble walking.  · You feel faint or pass out.  · You have severe symptoms that are different from your first symptoms.  MAKE SURE YOU:   · Understand these instructions.  · Will watch your condition.  · Will get help right away if you are not doing well or get worse.     This information is not intended to replace advice given to you by your health care provider. Make sure you discuss any questions you have with your health care provider.     Document Released: 12/18/2006 Document Revised: 01/08/2016 Document Reviewed: 08/25/2014  [employer] Interactive Patient Education ©2016 [employer]

## 2017-12-19 NOTE — ED NOTES
Bree Gage  37 y.o.  Chief Complaint   Patient presents with   • Migraine     since saturday       Ambulatory to triage with above complaint. Steady gait. States that she took her home Zofran, Tylenol, Naproxen, Percocet and Ergotamine with Caffeine without relief.       Triage process explained to patient, apologized for wait time, and returned to lobby.

## 2017-12-19 NOTE — ED NOTES
Chief Complaint   Patient presents with   • Migraine     since saturday     Agree with triage RN. Chart up for ERP.

## 2017-12-19 NOTE — ED NOTES
Pt discharged home as ordered by erp. Pt instructed to follow up with her PCP and return here as needed. Pt instructed no driving/drinking on pain meds. Pt verbalized understanding and left ambulating independently. Pt called a friend for a ride home.

## 2017-12-19 NOTE — CONSULTS
[facility]   TRIAGE ASSESSMENT    Name: Bree Gage  MRN: [id number]  : 1980  Age: 37 y.o.  Date of assessment: 2017  PCP: Maria Teresa Guerrier D.O.  Persons in attendance: Patient    CHIEF COMPLAINT/PRESENTING ISSUE (as stated by patient states depressed and tearful, drinking 6 24 oz steel reserves daily since  till. Experiencing bilateral tremors, nausea vomiting, unable to eat three days.  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    .):   Chief Complaint   Patient presents with   • Migraine     since saturday        CURRENT LIVING SITUATION/SOCIAL SUPPORT: with boyfriend    BEHAVIORAL HEALTH TREATMENT HISTORY  Does patient/parent report a history of prior behavioral health treatment for patient?   Yes:    Dates Level of Care Facilty/Provider Diagnosis/Problem Medications   Age 16 outpt [place] Depression and CD lexapro   18 inpt [place] CD and major depression Gabapentin 300 hs,celexia 40 mg hs, trazadone 150 hs, topamax 50 mg bid.                                                                   SAFETY ASSESSMENT - SELF  Does patient acknowledge current or past symptoms of dangerousness to self? Yes- suicide attempt 2014 by OD  Does parent/significant other report patient has current or past symptoms of dangerousness to self? no  Does presenting problem suggest symptoms of dangerousness to self? No    SAFETY ASSESSMENT - OTHERS  Does patient acknowledge current or past symptoms of aggressive behavior or risk to others? no  Does parent/significant other report patient has current or past symptoms of aggressive behavior or risk to others?  no  Does presenting problem suggest symptoms of dangerousness to others? No    Crisis Safety Plan completed and copy given to patient? yes    ABUSE/NEGLECT SCREENING  Does patient report feeling “unsafe” in his/her home, or afraid of anyone?  no  Does patient report any history of physical, sexual, or emotional abuse?  no  Does parent or  "significant other report any of the above? no  Is there evidence of neglect by self?  no  Is there evidence of neglect by a caregiver? no  Does the patient/parent report any history of CPS/APS/police involvement related to suspected abuse/neglect or domestic violence? no  Based on the information provided during the current assessment, is a mandated report of suspected abuse/neglect being made?  No    SUBSTANCE USE SCREENING  Yes:  Azael all substances used in the past 30 days:      Last Use Amount   [x]   Alcohol     [x]   Marijuana Twice this month    []   Heroin     []   Prescription Opioids  (used without prescription, for    recreation, or in excess of prescribed amount)     [x]   Other Prescription  (used without prescription, for    recreation, or in excess of prescribed amount) Xanax twice    []   Cocaine      []   Methamphetamine     []   \"\" drugs (ectasy, MDMA)     []   Other substances        UDS results: NA  Breathalyzer results: pending    What consequences does the patient associate with any of the above substance use and or addictive behaviors? Legal: dui , Work problems or losses: lost job and license, Relationship problems: lost , Health problems: detox, Monetary problems: loss of job    Risk factors for detox (check all that apply):  []  Seizures   [x]  Diaphoretic (sweating)   [x]  Tremors   []  Hallucinations   [x]  Increased blood pressure   []  Decreased blood pressure   [x]  Other nausea vomiting   []  None      [x] Patient education on risk factors for detoxification and instructed to return to ER as needed.        MENTAL STATUS   Participation: Active verbal participation  Grooming: Neat  Orientation: Fully Oriented  Behavior: Calm  Eye contact: Good  Mood: Depressed and Anxious  Affect: Sad, Anxious and Tearful  Thought process: Logical  Thought content: Within normal limits  Speech: Rate within normal limits  Perception: Within normal limits  Memory:  No gross evidence of memory " deficits  Insight: Limited  Judgment:  Limited  Other:    Collateral information: patient  Source:  [] Significant other present in person:   [] Significant other by telephone  [] [facility]   [] [facility] Nursing Staff  [x] [facility]  [] Other:     [] Unable to complete full assessment due to:  [] Acute intoxication  [] Patient declined to participate/engage  [] Patient verbally unresponsive  [] Significant cognitive deficits  [] Significant perceptual distortions or behavioral disorganization  [] Other:      CLINICAL IMPRESSIONS:  Primary:  Alcohol dependency- active withdrawal  Secondary:  Major depression vs mood disorder       IDENTIFIED NEEDS/PLAN:  [Trigger DISPOSITION list for any items marked]    []  Imminent safety risk - self [] Imminent safety risk - others   []  Acute substance withdrawal []  Psychosis/Impaired reality testing   [x]  Mood/anxiety [x]  Substance use/Addictive behavior   []  Maladaptive behaviro []  Parent/child conflict   []  Family/Couples conflict [x]  Biomedical   []  Housing []  Financial   []   Legal  Occupational/Educational   []  Domestic violence []  Other:     Disposition: Actively being addressed by [facility]    Does patient express agreement with the above plan? yes    Referral appointment(s) scheduled? no    Alert team only:   I have discussed findings and recommendations with Dr. Marks who is in agreement with these recommendations.     Referral information sent to the following community providers :      Roldan Tsang R.N.  12/19/2017

## 2017-12-19 NOTE — ED PROVIDER NOTES
"ED Provider Note    ED Provider Note    Scribed for Na Josue D.O. by Na Josue. 12/19/2017, 8:15 AM.    Primary care provider: Maria Teresa Guerrier D.O.  Means of arrival: POV  History obtained from: Patient  History limited by: None    CHIEF COMPLAINT  Chief Complaint   Patient presents with   • Migraine     since saturday       HPI  Bree Gage is a 37 y.o. female who presents to the Emergency DepartmentWith a chief complaint of a migraine headache. Patient has a long history of migraine headaches for the last 13 years. She recently transferred care to the neurologists here or now [facility]. Patient also states that over the last 6 months, her migraines have become \"hemiplegic\". She gets drooping of her left arm right and sinus congestion of her left maxillary sinus which she is experiencing today. She complains of photosensitivity and phantom smells. She reports that she's been experiencing nausea and vomiting which is also typical of her headaches, allowing her to not keep down her normal pain medication. The headache that she is experiencing today which started on Saturday, is classic for her. She states it came on gradually. She does have chronic daily headaches and right-sided daily pain level of 4-5 out of 10. When her headaches are bad she reports that they are 8-9 out of 10. She's tried multiple therapies in the past including calcium channel blockers, beta blockers, Lyrica, Neurontin, Botox none of which have been particularly helpful. Currently, she is on ergotamine and Fioricet. She's also takes Percocet which she states she is weaning off through the help of the pain management physician. She reports allergies only to Cipro. She's been seen by the nurse practitioner, Saskia Krishnamurthy and the neurology department here at [facility]. She has a family history of migraines but not as severe as she suffers from. Both her mom and aunt have less severe migraines. Patient states that " she talked with Saskia Krishnamurthy, prior to arrival and suggested that she come to the emergency department for possible treatment with DHE.    REVIEW OF SYSTEMS  Review of Systems   Constitutional: Negative for fever.   HENT: Positive for congestion.    Eyes: Positive for photophobia and pain.   Respiratory: Negative for shortness of breath.    Cardiovascular: Negative for chest pain.   Gastrointestinal: Positive for nausea and vomiting. Negative for abdominal pain.   Genitourinary: Negative for dysuria.   Neurological: Positive for headaches.   All other systems reviewed and are negative.      PAST MEDICAL HISTORY   has a past medical history of Alcohol addiction (CMS-HCC); Allergy; Angina (last 2 years); Arthritis (current); Bronchitis; Depression; Drug addiction (CMS-HCC); EBV positive mononucleosis syndrome; Fracture of finger; Hemorrhoids; Hyperlipidemia; Hypothyroidism; IBS (irritable bowel syndrome); Indigestion; Iron deficiency anemia; LBP (low back pain); Migraine; Rectal fissure; and Varicose veins.    SURGICAL HISTORY   has a past surgical history that includes tracheostomy (10/29/2009) and gastrostomy laparoscopic (10/29/2009).    SOCIAL HISTORY  Social History   Substance Use Topics   • Smoking status: Former Smoker     Packs/day: 0.50     Years: 13.00     Types: Cigarettes   • Smokeless tobacco: Current User      Comment: 1/2 every other day   • Alcohol use No      History   Drug Use   • Types: Methamphetamines     Comment: clean since 11/02  Rehab 11/02, 5/09 speed       FAMILY HISTORY  Family History   Problem Relation Age of Onset   • Hypertension Mother    • Hyperlipidemia Mother    • Alcohol/Drug Mother    • Psychiatry Mother    • Hypertension Father    • Hyperlipidemia Father    • Alcohol/Drug Father    • Psychiatry Father    • Cancer Maternal Grandfather    • Heart Disease Maternal Grandfather    • Heart Disease Paternal Grandfather    • Alcohol/Drug Paternal Grandfather        CURRENT  MEDICATIONS  Home Medications     Reviewed by Annabel Turcios R.N. (Registered Nurse) on 12/19/17 at 0711  Med List Status: Partial   Medication Last Dose Status   acetaminophen (TYLENOL) 500 MG TABS 11/30/2017 Active   ACETAMINOPHEN-CAFF-BUTALBITAL -40 MG Cap 11/30/2017 Active   aripiprazole (ABILIFY) 2 MG tablet not taking Active   azelastine (ASTELIN) 137 MCG/SPRAY nasal spray 11/30/2017 Active   busPIRone (BUSPAR) 10 MG Tab 11/30/2017 Active   busPIRone (BUSPAR) 10 MG TABS not taking Active   cetirizine (ZYRTEC) 10 MG TABS 11/30/2017 Active   Cetirizine HCl 10 MG Cap  Active   ergotamine-caffeine (CAFERGOT) 1-100 MG Tab  Active   fluoxetine (PROZAC) 40 MG capsule Not Taking Active   fluticasone (FLONASE) 50 MCG/ACT nasal spray 11/30/2017 Active   gabapentin (NEURONTIN) 100 MG CAPS not taking Active   hydrocodone-acetaminophen (NORCO) 5-325 MG TABS per tablet Not Taking Active   hydrocodone/acetaminophen (NORCO)  MG TABS Not Taking Active   hydrOXYzine (ATARAX) 25 MG Tab 11/30/2017 Active   hydrOXYzine (ATARAX) 25 MG Tab  Active   indomethacin (INDOCIN) 50 MG CAPS Not Taking Active   Lactobacillus Acidophilus Powder 11/30/2017 Active   Magnesium Oxide 500 MG Tab 11/30/2017 Active   MAGNESIUM PO  Active   methocarbamol (ROBAXIN) 500 MG Tab 11/30/2017 Active   methocarbamol (ROBAXIN) 500 MG Tab  Active   metoclopramide (REGLAN) 10 MG Tab  Active   metoclopramide (REGLAN) 5 MG tablet 10/11/2017 Active   modafinil (PROVIGIL) 200 MG Tab 11/30/2017 Active   modafinil (PROVIGIL) 200 MG Tab 10/11/2017 Active   montelukast (SINGULAIR) 10 MG Tab 11/30/2017 Active   montelukast (SINGULAIR) 10 MG Tab  Active   naproxen (NAPROSYN) 500 MG Tab 11/30/2017 Active   naproxen (NAPROSYN) 500 MG TABS  Active   nitrofurantoin monohydr macro (MACROBID) 100 MG CAPS 11/30/2017 Active   ondansetron (ZOFRAN ODT) 4 MG TABLET DISPERSIBLE  Active   ondansetron (ZOFRAN ODT) 4 MG TBDP 11/30/2017 Active   ondansetron (ZOFRAN ODT) 4  "MG TBDP Taking prn Active   oxycodone-acetaminophen (PERCOCET) 5-325 MG Tab 11/30/2017 Active   oxycodone-acetaminophen (PERCOCET-10)  MG Tab  Active   pregabalin (LYRICA) 75 MG CAPS Not Taking Active   promethazine (PHENERGAN) 25 MG Tab  Active   promethazine-codeine (PHENERGAN-CODEINE) 6.25-10 MG/5ML Syrup Not Taking Active   QVAR 80 MCG/ACT inhaler 11/30/2017 Active   tramadol (ULTRAM) 50 MG TABS not taking Active   venlafaxine XR (EFFEXOR XR) 37.5 MG CAPSULE SR 24 HR 11/30/2017 Active                ALLERGIES  Allergies   Allergen Reactions   • Ciprofloxacin    • Diclofenac      Upset     • Epinephrine      palpitations   • Latex        PHYSICAL EXAM  VITAL SIGNS: /77   Pulse 88   Temp 36.6 °C (97.9 °F)   Resp 18   Ht 1.549 m (5' 1\")   Wt 85.3 kg (188 lb 0.8 oz)   SpO2 94%   BMI 35.53 kg/m²   Vitals reviewed.  Constitutional: Patient is oriented to person, place, and time. Appears well-developed and well-nourished.Mild distress.  she rests and holds her left eye with her left hand. She also rests with cover over her head to block the light.  Head: Normocephalic and atraumatic.   Ears: Normal external ears bilaterally.   Mouth/Throat: Oropharynx is clear and moist  Eyes: Conjunctivae are normal. Pupils are equal, round, and reactive to light.   Neck: Normal range of motion. Neck supple.  Cardiovascular: Normal rate, regular rhythm and normal heart sounds. Normal peripheral pulses.  Pulmonary/Chest: Effort normal and breath sounds normal. No respiratory distress, no wheezes, rhonchi, or rales.  Abdominal: Soft. Bowel sounds are normal. There is no tenderness, rebound or guarding, or peritoneal signs  Musculoskeletal: No edema and no tenderness.   Neurological: No focal deficits.   Skin: Skin is warm and dry. No erythema. No pallor.   Psychiatric: Patient has a normal mood and affect.     COURSE & MEDICAL DECISION MAKING  Pertinent Labs & Imaging studies reviewed. (See chart for " details)    Obtained and reviewed past medical records. Patient last encounter in the emergency department was October 2014. With nausea, vomiting and headache. Patient does have a history of migraine at that time. Multiple other outpatient encounters, most recently November 30, 2017. Patient was seen for follow-up on headaches.    8:15 AM - Patient seen and examined at bedside. Pleasant, female, unfortunately, she suffers from chronic migraines. Patient will be treated with IV fluids, antibiotics, steroids and Benadryl. This is a classic headache for her. She's had them for many years. I do not think imaging is indicated at this time. The differential diagnoses include but are not limited to: Acute exacerbation of chronic daily headaches, history of migraines, dehydration    9:44 AM, neurology paged    10 AM, discussed with Dr. Delgadillo, neurologist who agrees with plan for treatment with DHE at a dose of 1 mg. He did advise treatment with Imitrex subcutaneous prior to DHE, if there is no contraindication.    10:03 AM, patient's reevaluated. She reports a mild improvement. Initially, pain was 8.5 out of 10 now at 7.5. She still has about 500 mL of fluids to infuse. She reports that in the past, she has not been able to take Imitrex as it makes her headaches worse. I canceled this and will treat with DHE.     12:05 PM patient's reevaluated. She is feeling still slightly better. Still with significant pain. She only has about 200 mL left in her IV bag. She reports her nausea is improved. She requests Toradol and states that she's taken it many times in the past and not had any reaction. In addition, she's gotten results from Compazine. Both of these have been ordered in addition, she reports she is angry and she'll be given some dietary.    1:09 PM patient's reevaluated. She just received Toradol and has not yet received Compazine. She also has not yet received snacks. We'll reevaluate once she is fully  medicated.    1:22 PM patient was reevaluated. She feels better. She is not pain-free but she knows that now the way she is feeling, she can go home and rest and will be at least back to her baseline. She will follow-up with her neurology clinic this week. She is otherwise well-appearing and nontoxic. She was grateful for the care. She'll be discharged to home in stable condition.    FINAL IMPRESSION  1. Intractable headache, unspecified chronicity pattern, unspecified headache type    2. History of migraine    3. Chronic daily headache

## 2018-01-04 RX ORDER — ERGOTAMINE TARTRATE AND CAFFEINE 1; 100 MG/1; MG/1
1 TABLET, FILM COATED ORAL
Qty: 30 TAB | Refills: 11 | Status: SHIPPED | OUTPATIENT
Start: 2018-01-04 | End: 2018-04-30 | Stop reason: SDUPTHER

## 2018-01-09 ENCOUNTER — OFFICE VISIT (OUTPATIENT)
Dept: NEUROLOGY | Facility: MEDICAL CENTER | Age: 38
End: 2018-01-09
Payer: COMMERCIAL

## 2018-01-09 VITALS
TEMPERATURE: 98.5 F | DIASTOLIC BLOOD PRESSURE: 80 MMHG | HEART RATE: 82 BPM | SYSTOLIC BLOOD PRESSURE: 118 MMHG | HEIGHT: 61 IN | OXYGEN SATURATION: 96 % | WEIGHT: 184.2 LBS | BODY MASS INDEX: 34.78 KG/M2

## 2018-01-09 DIAGNOSIS — G43.119 INTRACTABLE MIGRAINE WITH AURA WITHOUT STATUS MIGRAINOSUS: ICD-10-CM

## 2018-01-09 PROCEDURE — 64400 NJX AA&/STRD TRIGEMINAL NRV: CPT | Mod: 50,59 | Performed by: PHYSICIAN ASSISTANT

## 2018-01-09 PROCEDURE — 64405 NJX AA&/STRD GR OCPL NRV: CPT | Mod: 50,59 | Performed by: PHYSICIAN ASSISTANT

## 2018-01-09 PROCEDURE — 64600 INJECTION TREATMENT OF NERVE: CPT | Mod: 50 | Performed by: PHYSICIAN ASSISTANT

## 2018-01-09 PROCEDURE — S0020 INJECTION, BUPIVICAINE HYDRO: HCPCS | Performed by: PHYSICIAN ASSISTANT

## 2018-01-09 PROCEDURE — 64450 NJX AA&/STRD OTHER PN/BRANCH: CPT | Mod: 50,59 | Performed by: PHYSICIAN ASSISTANT

## 2018-01-09 RX ORDER — KETOROLAC TROMETHAMINE 30 MG/ML
60 INJECTION, SOLUTION INTRAMUSCULAR; INTRAVENOUS ONCE
Status: COMPLETED | OUTPATIENT
Start: 2018-01-09 | End: 2018-01-09

## 2018-01-09 RX ORDER — BUPIVACAINE HYDROCHLORIDE 5 MG/ML
20 INJECTION, SOLUTION EPIDURAL; INTRACAUDAL ONCE
Status: COMPLETED | OUTPATIENT
Start: 2018-01-09 | End: 2018-01-09

## 2018-01-09 RX ORDER — DEXAMETHASONE 4 MG/1
TABLET ORAL
Qty: 6 TAB | Refills: 0 | Status: SHIPPED | OUTPATIENT
Start: 2018-01-09 | End: 2018-02-14 | Stop reason: SDUPTHER

## 2018-01-09 RX ORDER — TRIAMCINOLONE ACETONIDE 40 MG/ML
80 INJECTION, SUSPENSION INTRA-ARTICULAR; INTRAMUSCULAR ONCE
Status: COMPLETED | OUTPATIENT
Start: 2018-01-09 | End: 2018-01-09

## 2018-01-09 RX ADMIN — TRIAMCINOLONE ACETONIDE 80 MG: 40 INJECTION, SUSPENSION INTRA-ARTICULAR; INTRAMUSCULAR at 11:06

## 2018-01-09 RX ADMIN — KETOROLAC TROMETHAMINE 60 MG: 30 INJECTION, SOLUTION INTRAMUSCULAR; INTRAVENOUS at 11:05

## 2018-01-09 RX ADMIN — BUPIVACAINE HYDROCHLORIDE 20 ML: 5 INJECTION, SOLUTION EPIDURAL; INTRACAUDAL at 11:05

## 2018-01-09 NOTE — PROGRESS NOTES
BONB procedure:  I performed a bilateral occipital nerve block in clinic today, injecting bupivacaine [5] cc and triamcinolone [40] mg in both sides.  The patient tolerated the procedure well; there were no complications.      I also injected bilateral trigeminal nerves in clinic today, injecting bupivacaine 1.5 cc  and 3.5 cc bupivicaine in bilateral lesser occipital nerves without any complications and the procedure was well tolerated.    I also injected bilateral supraorbital nerves in clinic today, injecting bupivacaine 1. cc  without any complications and the procedure was well tolerated.    MA administered toradol

## 2018-01-09 NOTE — LETTER
[facility] NEUROLOGY  [address] [place] [address]     January 9, 2018    Patient: Bree Gage   YOB: 1980   Date of Visit: 1/9/2018       To Whom It May Concern:    Bree Gage was seen and treated in our department on 1/9/2018, please excuse her from work for 1/9/18, 1/10/18. Bree may return to work on 1/11/18.        Sincerely,       Jessica Krishnamurthy PA-C

## 2018-02-14 NOTE — TELEPHONE ENCOUNTER
Pt requesting a refill on Ondansetron and Dexamethasone. Pt was seen on 1/9/18, has a f/v on 2/22/18.

## 2018-02-15 RX ORDER — DEXAMETHASONE 4 MG/1
TABLET ORAL
Qty: 6 TAB | Refills: 1 | Status: SHIPPED | OUTPATIENT
Start: 2018-02-15 | End: 2018-04-02 | Stop reason: SDUPTHER

## 2018-02-15 RX ORDER — ONDANSETRON 4 MG/1
TABLET, FILM COATED ORAL
Qty: 12 TAB | Refills: 11 | Status: SHIPPED | OUTPATIENT
Start: 2018-02-15 | End: 2019-04-17

## 2018-02-16 ENCOUNTER — OFFICE VISIT (OUTPATIENT)
Dept: URGENT CARE | Facility: PHYSICIAN GROUP | Age: 38
End: 2018-02-16
Payer: COMMERCIAL

## 2018-02-16 VITALS
OXYGEN SATURATION: 96 % | WEIGHT: 182 LBS | DIASTOLIC BLOOD PRESSURE: 78 MMHG | BODY MASS INDEX: 34.36 KG/M2 | HEART RATE: 88 BPM | HEIGHT: 61 IN | TEMPERATURE: 99 F | SYSTOLIC BLOOD PRESSURE: 120 MMHG | RESPIRATION RATE: 14 BRPM

## 2018-02-16 DIAGNOSIS — G43.011 INTRACTABLE MIGRAINE WITHOUT AURA AND WITH STATUS MIGRAINOSUS: Primary | ICD-10-CM

## 2018-02-16 PROCEDURE — 99214 OFFICE O/P EST MOD 30 MIN: CPT | Performed by: PHYSICIAN ASSISTANT

## 2018-02-16 RX ORDER — KETOROLAC TROMETHAMINE 30 MG/ML
60 INJECTION, SOLUTION INTRAMUSCULAR; INTRAVENOUS ONCE
Status: COMPLETED | OUTPATIENT
Start: 2018-02-16 | End: 2018-02-16

## 2018-02-16 RX ADMIN — KETOROLAC TROMETHAMINE 60 MG: 30 INJECTION, SOLUTION INTRAMUSCULAR; INTRAVENOUS at 09:57

## 2018-02-16 ASSESSMENT — ENCOUNTER SYMPTOMS: HEADACHES: 1

## 2018-02-16 NOTE — LETTER
February 16, 2018         Patient: Bree Gage   YOB: 1980   Date of Visit: 2/16/2018           To Whom it May Concern:    Bree Gage was seen in my clinic on 2/16/2018. Please excuse her absence from work 02/14/2018-02/16/2018.      If you have any questions or concerns, please don't hesitate to call.        Sincerely,           Abiola Brannon P.A.-C.  Electronically Signed

## 2018-02-16 NOTE — PROGRESS NOTES
Subjective:      Bree Gage is a 37 y.o. female who presents with Migraine (since sunday )    PMH:  has a past medical history of Alcohol addiction (CMS-HCC); Allergy; Angina (last 2 years); Arthritis (current); Bronchitis; Depression; Drug addiction (CMS-HCC); EBV positive mononucleosis syndrome; Fracture of finger; Hemorrhoids; Hyperlipidemia; Hypothyroidism; IBS (irritable bowel syndrome); Indigestion; Iron deficiency anemia; LBP (low back pain); Migraine; Rectal fissure; and Varicose veins.   MEDS:   Current Outpatient Prescriptions:   •  ergotamine-caffeine (CAFERGOT) 1-100 MG Tab, Take 1 Tab by mouth every 1 hour as needed for Headache. Monitor caffeine intake with this medication.  Maximum use 6 tabs in one day., Disp: 30 Tab, Rfl: 11  •  promethazine (PHENERGAN) 25 MG Tab, Take 1 Tab by mouth every 6 hours as needed for Nausea/Vomiting., Disp: 30 Tab, Rfl: 6  •  fluticasone (FLONASE) 50 MCG/ACT nasal spray, Spray 1 Spray in nose., Disp: , Rfl:   •  Lactobacillus Acidophilus Powder, Take  by mouth., Disp: , Rfl:   •  Magnesium Oxide 500 MG Tab, Take  by mouth., Disp: , Rfl:   •  modafinil (PROVIGIL) 200 MG Tab, Take 200 mg by mouth., Disp: , Rfl:   •  montelukast (SINGULAIR) 10 MG Tab, Take 4 mg by mouth., Disp: , Rfl:   •  busPIRone (BUSPAR) 10 MG Tab, Take 10 mg by mouth., Disp: , Rfl:   •  Cetirizine HCl 10 MG Cap, Take 10 mg by mouth., Disp: , Rfl:   •  naproxen (NAPROSYN) 500 MG Tab, Take 500 mg by mouth., Disp: , Rfl:   •  azelastine (ASTELIN) 137 MCG/SPRAY nasal spray, , Disp: , Rfl:   •  QVAR 80 MCG/ACT inhaler, , Disp: , Rfl:   •  ACETAMINOPHEN-CAFF-BUTALBITAL -40 MG Cap, , Disp: , Rfl:   •  metoclopramide (REGLAN) 10 MG Tab, , Disp: , Rfl:   •  oxycodone-acetaminophen (PERCOCET) 5-325 MG Tab, , Disp: , Rfl:   •  venlafaxine XR (EFFEXOR XR) 37.5 MG CAPSULE SR 24 HR, Take 1 Cap by mouth every day., Disp: 30 Cap, Rfl: 3  •  nitrofurantoin monohydr macro (MACROBID) 100 MG CAPS, Take 100  mg by mouth 2 times a day., Disp: , Rfl:   •  acetaminophen (TYLENOL) 500 MG TABS, Take 500-1,000 mg by mouth every 6 hours as needed. Up to 8 a day, Disp: , Rfl:   •  ondansetron (ZOFRAN ODT) 4 MG TBDP, Take 0.5 Tabs by mouth every 6 hours as needed for Nausea/Vomiting., Disp: 5 Each, Rfl: 2  •  cetirizine (ZYRTEC) 10 MG TABS, Take 10 mg by mouth every day.  , Disp: , Rfl:   •  ondansetron (ZOFRAN) 4 MG Tab tablet, TAKE ONE TABLET BY MOUTH EVERY 8 HOURS AS NEEDED FOR NAUSEA AND VOMITING, Disp: 12 Tab, Rfl: 11  •  dexamethasone (DECADRON) 4 MG Tab, TAKE 3 TABLETS BY MOUTH AT ONCE ON DAY 1, THEN TAKE 2 TABLETS ON DAY 2, THEN 1 TABLET ON DAY THREE. FOR INTRACTABLE MIGRAINE, Disp: 6 Tab, Rfl: 1  •  hydrOXYzine (ATARAX) 25 MG Tab, Take 25 mg by mouth., Disp: , Rfl:   •  methocarbamol (ROBAXIN) 500 MG Tab, Take 500 mg by mouth., Disp: , Rfl:   •  metoclopramide (REGLAN) 5 MG tablet, Take 5 mg by mouth., Disp: , Rfl:   •  oxycodone-acetaminophen (PERCOCET-10)  MG Tab, Take  by mouth., Disp: , Rfl:   •  ondansetron (ZOFRAN ODT) 4 MG TABLET DISPERSIBLE, Take 2 mg by mouth., Disp: , Rfl:   •  hydrOXYzine (ATARAX) 25 MG Tab, , Disp: , Rfl:   •  methocarbamol (ROBAXIN) 500 MG Tab, , Disp: , Rfl:   •  modafinil (PROVIGIL) 200 MG Tab, , Disp: , Rfl:   •  montelukast (SINGULAIR) 10 MG Tab, , Disp: , Rfl:   •  MAGNESIUM PO, Take  by mouth., Disp: , Rfl:   •  promethazine-codeine (PHENERGAN-CODEINE) 6.25-10 MG/5ML Syrup, Take 5 mL by mouth every 12 hours as needed. (Patient not taking: Reported on 10/11/2017), Disp: 120 mL, Rfl: 0  •  busPIRone (BUSPAR) 10 MG TABS, Take 1 Tab by mouth 2 times a day., Disp: 60 Tab, Rfl: 3  •  aripiprazole (ABILIFY) 2 MG tablet, Take 1 Tab by mouth every day., Disp: 30 Tab, Rfl: 3  •  ondansetron (ZOFRAN ODT) 4 MG TBDP, Take 1 Tab by mouth every 8 hours as needed for Nausea/Vomiting., Disp: 12 Tab, Rfl: 0  •  tramadol (ULTRAM) 50 MG TABS, Take 1-2 Tabs by mouth every 6 hours as needed for  Severe Pain., Disp: 30 Tab, Rfl: 0  •  fluoxetine (PROZAC) 40 MG capsule, Take 1 Cap by mouth every day. (Patient not taking: Reported on 10/11/2017), Disp: 30 Cap, Rfl: 3  •  gabapentin (NEURONTIN) 100 MG CAPS, Take 1 tab PO TID x 3 days then increase to 2 tabs PO TID x 3 days then increase to 3 tabs PO TID and remain on that dose as long as you are tolerating the medication., Disp: 90 Cap, Rfl: 3  •  hydrocodone-acetaminophen (NORCO) 5-325 MG TABS per tablet, Take 1-2 Tabs by mouth every four hours as needed., Disp: 30 Tab, Rfl: 0  •  hydrocodone/acetaminophen (NORCO)  MG TABS, Take 1-2 Tabs by mouth every 6 hours as needed., Disp: , Rfl:   •  indomethacin (INDOCIN) 50 MG CAPS, Take 1 Cap by mouth 3 times a day, with meals., Disp: 90 Cap, Rfl: 6  •  naproxen (NAPROSYN) 500 MG TABS, Take 500 mg by mouth as needed., Disp: , Rfl:   •  pregabalin (LYRICA) 75 MG CAPS, Take 2 Caps by mouth 2 times a day., Disp: 60 Cap, Rfl: 11  ALLERGIES:   Allergies   Allergen Reactions   • Ciprofloxacin    • Compazine Unspecified     agitation   • Diclofenac      Upset     • Epinephrine      palpitations   • Latex      SURGHX:   Past Surgical History:   Procedure Laterality Date   • TRACHEOSTOMY  10/29/2009    Performed by NIDHI LARA at SURGERY [facility]   • GASTROSTOMY LAPAROSCOPIC  10/29/2009    Performed by NIDHI LARA at [facility]     SOCHX:  reports that she has quit smoking. Her smoking use included Cigarettes. She has a 6.50 pack-year smoking history. She uses smokeless tobacco. She reports that she uses drugs, including Methamphetamines. She reports that she does not drink alcohol.  FH: family history includes Alcohol/Drug in her father, mother, and paternal grandfather; Cancer in her maternal grandfather; Heart Disease in her maternal grandfather and paternal grandfather; Hyperlipidemia in her father and mother; Hypertension in her father and mother; Psychiatry in her father and mother.  "Reviewed with patient/family. Not pertinent to this complaint.            Pt with hx of migraine HA co persistent HA that she cannot \"break at home.\"  Would like a toradol shot and a work note.  HA are typical for her.       Headache    This is a new problem. The current episode started in the past 7 days. The problem occurs constantly. The problem has been gradually worsening. The pain is located in the left unilateral region. The pain does not radiate. The pain quality is similar to prior headaches. The quality of the pain is described as boring and squeezing. Associated symptoms include ear pain, nausea, numbness and photophobia. Pertinent negatives include no blurred vision, fever or weakness. She has tried darkened room, NSAIDs, antidepressants and beta blockers for the symptoms. The treatment provided no relief. Her past medical history is significant for migraine headaches and migraines in the family.       Review of Systems   Constitutional: Negative for fever.   HENT: Positive for ear pain.    Eyes: Positive for photophobia. Negative for blurred vision.   Gastrointestinal: Positive for nausea.   Neurological: Positive for numbness and headaches. Negative for sensory change, speech change, focal weakness and weakness.   All other systems reviewed and are negative.         Objective:     /78   Pulse 88   Temp 37.2 °C (99 °F)   Resp 14   Ht 1.549 m (5' 1\")   Wt 82.6 kg (182 lb)   SpO2 96%   BMI 34.39 kg/m²      Physical Exam   Constitutional: She is oriented to person, place, and time. She appears well-developed and well-nourished. No distress.   HENT:   Head: Normocephalic and atraumatic.   Right Ear: Tympanic membrane normal.   Left Ear: Tympanic membrane normal.   Nose: Nose normal.   Mouth/Throat: Uvula is midline, oropharynx is clear and moist and mucous membranes are normal.   Eyes: Conjunctivae, EOM and lids are normal. Pupils are equal, round, and reactive to light.   Neck: Full passive " range of motion without pain. Neck supple. No neck rigidity.   Cardiovascular: Normal rate, regular rhythm, normal heart sounds and intact distal pulses.    Pulmonary/Chest: Effort normal and breath sounds normal.   Musculoskeletal: Normal range of motion.   Neurological: She is alert and oriented to person, place, and time. She has normal strength. No cranial nerve deficit or sensory deficit. She displays a negative Romberg sign. GCS eye subscore is 4. GCS verbal subscore is 5. GCS motor subscore is 6.   Skin: Skin is warm and dry.   Psychiatric: She has a normal mood and affect.   Nursing note and vitals reviewed.            Assessment/Plan:     1. Intractable migraine without aura and with status migrainosus  ketorolac (TORADOL) injection 60 mg     Pt requesting toradol injection, states it works well for her.      PT to continue taking prescription medications as prescribed.      PT should follow up with PCP in 1-2 days for re-evaluation if symptoms have not improved.  Discussed red flags and reasons to return to UC or ED.  Pt and/or family verbalized understanding of diagnosis and follow up instructions and was offered informational handout on diagnosis.  PT discharged.

## 2018-02-20 RX ORDER — VENLAFAXINE HYDROCHLORIDE 37.5 MG/1
CAPSULE, EXTENDED RELEASE ORAL
Qty: 30 CAP | Refills: 0 | Status: SHIPPED | OUTPATIENT
Start: 2018-02-20 | End: 2018-06-22

## 2018-02-22 ENCOUNTER — OFFICE VISIT (OUTPATIENT)
Dept: NEUROLOGY | Facility: MEDICAL CENTER | Age: 38
End: 2018-02-22
Payer: COMMERCIAL

## 2018-02-22 DIAGNOSIS — G43.111 INTRACTABLE MIGRAINE WITH AURA WITH STATUS MIGRAINOSUS: ICD-10-CM

## 2018-02-22 PROCEDURE — 99213 OFFICE O/P EST LOW 20 MIN: CPT | Performed by: PHYSICIAN ASSISTANT

## 2018-02-22 NOTE — PROGRESS NOTES
Cc:  - migraine    Established patient of our clinic  who suffers from see above  Here today for completion of paperwork for FMLA/disability/related to their condition.    They report to me that they have been unable to do their job for quite some time due to intractable migraine.    Review of the medical chart and interviewing patient, I completed paperwork and it is scanned into media.     RTC as previously scheduled.    Total time with this visit: 15    Minutes face-to-face with patient. More than 50% of this visit was spent reviewing medical chart and speaking with the patient about their condition and how it affects their ability to do their job.

## 2018-02-23 ASSESSMENT — ENCOUNTER SYMPTOMS
PHOTOPHOBIA: 1
NAUSEA: 1
SPEECH CHANGE: 0
SENSORY CHANGE: 0
WEAKNESS: 0
MIGRAINE HEADACHES: 1
FOCAL WEAKNESS: 0
FEVER: 0
NUMBNESS: 1
BLURRED VISION: 0

## 2018-03-21 ENCOUNTER — HOSPITAL ENCOUNTER (EMERGENCY)
Facility: MEDICAL CENTER | Age: 38
End: 2018-03-21
Attending: EMERGENCY MEDICINE
Payer: COMMERCIAL

## 2018-03-21 VITALS
WEIGHT: 180 LBS | OXYGEN SATURATION: 99 % | TEMPERATURE: 97.9 F | HEART RATE: 85 BPM | RESPIRATION RATE: 17 BRPM | BODY MASS INDEX: 33.99 KG/M2 | SYSTOLIC BLOOD PRESSURE: 106 MMHG | HEIGHT: 61 IN | DIASTOLIC BLOOD PRESSURE: 77 MMHG

## 2018-03-21 DIAGNOSIS — E86.0 DEHYDRATION: ICD-10-CM

## 2018-03-21 DIAGNOSIS — G44.59 OTHER COMPLICATED HEADACHE SYNDROME: ICD-10-CM

## 2018-03-21 LAB — EKG IMPRESSION: NORMAL

## 2018-03-21 PROCEDURE — 93005 ELECTROCARDIOGRAM TRACING: CPT | Performed by: EMERGENCY MEDICINE

## 2018-03-21 PROCEDURE — 99284 EMERGENCY DEPT VISIT MOD MDM: CPT

## 2018-03-21 PROCEDURE — 700105 HCHG RX REV CODE 258: Performed by: EMERGENCY MEDICINE

## 2018-03-21 PROCEDURE — 96375 TX/PRO/DX INJ NEW DRUG ADDON: CPT

## 2018-03-21 PROCEDURE — 93005 ELECTROCARDIOGRAM TRACING: CPT

## 2018-03-21 PROCEDURE — 96361 HYDRATE IV INFUSION ADD-ON: CPT

## 2018-03-21 PROCEDURE — 700111 HCHG RX REV CODE 636 W/ 250 OVERRIDE (IP): Performed by: EMERGENCY MEDICINE

## 2018-03-21 PROCEDURE — 96374 THER/PROPH/DIAG INJ IV PUSH: CPT

## 2018-03-21 RX ORDER — METOCLOPRAMIDE HYDROCHLORIDE 5 MG/ML
10 INJECTION INTRAMUSCULAR; INTRAVENOUS ONCE
Status: COMPLETED | OUTPATIENT
Start: 2018-03-21 | End: 2018-03-21

## 2018-03-21 RX ORDER — METHYLPREDNISOLONE SODIUM SUCCINATE 125 MG/2ML
125 INJECTION, POWDER, LYOPHILIZED, FOR SOLUTION INTRAMUSCULAR; INTRAVENOUS ONCE
Status: COMPLETED | OUTPATIENT
Start: 2018-03-21 | End: 2018-03-21

## 2018-03-21 RX ORDER — DIPHENHYDRAMINE HYDROCHLORIDE 50 MG/ML
25 INJECTION INTRAMUSCULAR; INTRAVENOUS ONCE
Status: COMPLETED | OUTPATIENT
Start: 2018-03-21 | End: 2018-03-21

## 2018-03-21 RX ORDER — ONDANSETRON 2 MG/ML
4 INJECTION INTRAMUSCULAR; INTRAVENOUS ONCE
Status: COMPLETED | OUTPATIENT
Start: 2018-03-21 | End: 2018-03-21

## 2018-03-21 RX ORDER — SODIUM CHLORIDE 9 MG/ML
2000 INJECTION, SOLUTION INTRAVENOUS ONCE
Status: COMPLETED | OUTPATIENT
Start: 2018-03-21 | End: 2018-03-21

## 2018-03-21 RX ADMIN — METHYLPREDNISOLONE SODIUM SUCCINATE 125 MG: 125 INJECTION, POWDER, FOR SOLUTION INTRAMUSCULAR; INTRAVENOUS at 10:07

## 2018-03-21 RX ADMIN — DIPHENHYDRAMINE HYDROCHLORIDE 25 MG: 50 INJECTION, SOLUTION INTRAMUSCULAR; INTRAVENOUS at 10:06

## 2018-03-21 RX ADMIN — ONDANSETRON HYDROCHLORIDE 4 MG: 2 INJECTION, SOLUTION INTRAMUSCULAR; INTRAVENOUS at 10:07

## 2018-03-21 RX ADMIN — METOCLOPRAMIDE 10 MG: 5 INJECTION, SOLUTION INTRAMUSCULAR; INTRAVENOUS at 10:07

## 2018-03-21 RX ADMIN — SODIUM CHLORIDE 2000 ML: 9 INJECTION, SOLUTION INTRAVENOUS at 10:06

## 2018-03-21 ASSESSMENT — PAIN SCALES - GENERAL: PAINLEVEL_OUTOF10: 10

## 2018-03-21 NOTE — DISCHARGE INSTRUCTIONS
Dehydration, Adult  Dehydration is a condition in which there is not enough fluid or water in the body. This happens when you lose more fluids than you take in. Important organs, such as the kidneys, brain, and heart, cannot function without a proper amount of fluids. Any loss of fluids from the body can lead to dehydration.  Dehydration can range from mild to severe. This condition should be treated right away to prevent it from becoming severe.  What are the causes?  This condition may be caused by:  · Vomiting.  · Diarrhea.  · Excessive sweating, such as from heat exposure or exercise.  · Not drinking enough fluid, especially:  ¨ When ill.  ¨ While doing activity that requires a lot of energy.  · Excessive urination.  · Fever.  · Infection.  · Certain medicines, such as medicines that cause the body to lose excess fluid (diuretics).  · Inability to access safe drinking water.  · Reduced physical ability to get adequate water and food.  What increases the risk?  This condition is more likely to develop in people:  · Who have a poorly controlled long-term (chronic) illness, such as diabetes, heart disease, or kidney disease.  · Who are age 65 or older.  · Who are disabled.  · Who live in a place with high altitude.  · Who play endurance sports.  What are the signs or symptoms?  Symptoms of mild dehydration may include:  · Thirst.  · Dry lips.  · Slightly dry mouth.  · Dry, warm skin.  · Dizziness.  Symptoms of moderate dehydration may include:  · Very dry mouth.  · Muscle cramps.  · Dark urine. Urine may be the color of tea.  · Decreased urine production.  · Decreased tear production.  · Heartbeat that is irregular or faster than normal (palpitations).  · Headache.  · Light-headedness, especially when you stand up from a sitting position.  · Fainting (syncope).  Symptoms of severe dehydration may include:  · Changes in skin, such as:  ¨ Cold and clammy skin.  ¨ Blotchy (mottled) or pale skin.  ¨ Skin that does not  quickly return to normal after being lightly pinched and released (poor skin turgor).  · Changes in body fluids, such as:  ¨ Extreme thirst.  ¨ No tear production.  ¨ Inability to sweat when body temperature is high, such as in hot weather.  ¨ Very little urine production.  · Changes in vital signs, such as:  ¨ Weak pulse.  ¨ Pulse that is more than 100 beats a minute when sitting still.  ¨ Rapid breathing.  ¨ Low blood pressure.  · Other changes, such as:  ¨ Sunken eyes.  ¨ Cold hands and feet.  ¨ Confusion.  ¨ Lack of energy (lethargy).  ¨ Difficulty waking up from sleep.  ¨ Short-term weight loss.  ¨ Unconsciousness.  How is this diagnosed?  This condition is diagnosed based on your symptoms and a physical exam. Blood and urine tests may be done to help confirm the diagnosis.  How is this treated?  Treatment for this condition depends on the severity. Mild or moderate dehydration can often be treated at home. Treatment should be started right away. Do not wait until dehydration becomes severe. Severe dehydration is an emergency and it needs to be treated in a hospital.  Treatment for mild dehydration may include:  · Drinking more fluids.  · Replacing salts and minerals in your blood (electrolytes) that you may have lost.  Treatment for moderate dehydration may include:  · Drinking an oral rehydration solution (ORS). This is a drink that helps you replace fluids and electrolytes (rehydrate). It can be found at pharmacies and retail stores.  Treatment for severe dehydration may include:  · Receiving fluids through an IV tube.  · Receiving an electrolyte solution through a feeding tube that is passed through your nose and into your stomach (nasogastric tube, or NG tube).  · Correcting any abnormalities in electrolytes.  · Treating the underlying cause of dehydration.  Follow these instructions at home:  · If directed by your health care provider, drink an ORS:  ¨ Make an ORS by following instructions on the  package.  ¨ Start by drinking small amounts, about ½ cup (120 mL) every 5-10 minutes.  ¨ Slowly increase how much you drink until you have taken the amount recommended by your health care provider.  · Drink enough clear fluid to keep your urine clear or pale yellow. If you were told to drink an ORS, finish the ORS first, then start slowly drinking other clear fluids. Drink fluids such as:  ¨ Water. Do not drink only water. Doing that can lead to having too little salt (sodium) in the body (hyponatremia).  ¨ Ice chips.  ¨ Fruit juice that you have added water to (diluted fruit juice).  ¨ Low-calorie sports drinks.  · Avoid:  ¨ Alcohol.  ¨ Drinks that contain a lot of sugar. These include high-calorie sports drinks, fruit juice that is not diluted, and soda.  ¨ Caffeine.  ¨ Foods that are greasy or contain a lot of fat or sugar.  · Take over-the-counter and prescription medicines only as told by your health care provider.  · Do not take sodium tablets. This can lead to having too much sodium in the body (hypernatremia).  · Eat foods that contain a healthy balance of electrolytes, such as bananas, oranges, potatoes, tomatoes, and spinach.  · Keep all follow-up visits as told by your health care provider. This is important.  Contact a health care provider if:  · You have abdominal pain that:  ¨ Gets worse.  ¨ Stays in one area (localizes).  · You have a rash.  · You have a stiff neck.  · You are more irritable than usual.  · You are sleepier or more difficult to wake up than usual.  · You feel weak or dizzy.  · You feel very thirsty.  · You have urinated only a small amount of very dark urine over 6-8 hours.  Get help right away if:  · You have symptoms of severe dehydration.  · You cannot drink fluids without vomiting.  · Your symptoms get worse with treatment.  · You have a fever.  · You have a severe headache.  · You have vomiting or diarrhea that:  ¨ Gets worse.  ¨ Does not go away.  · You have blood or green matter  (bile) in your vomit.  · You have blood in your stool. This may cause stool to look black and tarry.  · You have not urinated in 6-8 hours.  · You faint.  · Your heart rate while sitting still is over 100 beats a minute.  · You have trouble breathing.  This information is not intended to replace advice given to you by your health care provider. Make sure you discuss any questions you have with your health care provider.  Document Released: 12/18/2006 Document Revised: 07/14/2017 Document Reviewed: 02/10/2017  [employer] Interactive Patient Education © 2017 [employer]      General Headache Without Cause  A headache is pain or discomfort felt around the head or neck area. The specific cause of a headache may not be found. There are many causes and types of headaches. A few common ones are:  · Tension headaches.  · Migraine headaches.  · Cluster headaches.  · Chronic daily headaches.  Follow these instructions at home:  Watch your condition for any changes. Take these steps to help with your condition:  Managing pain  · Take over-the-counter and prescription medicines only as told by your health care provider.  · Lie down in a dark, quiet room when you have a headache.  · If directed, apply ice to the head and neck area:  ¨ Put ice in a plastic bag.  ¨ Place a towel between your skin and the bag.  ¨ Leave the ice on for 20 minutes, 2-3 times per day.  · Use a heating pad or hot shower to apply heat to the head and neck area as told by your health care provider.  · Keep lights dim if bright lights bother you or make your headaches worse.  Eating and drinking  · Eat meals on a regular schedule.  · Limit alcohol use.  · Decrease the amount of caffeine you drink, or stop drinking caffeine.  General instructions  · Keep all follow-up visits as told by your health care provider. This is important.  · Keep a headache journal to help find out what may trigger your headaches. For example, write down:  ¨ What you eat and  drink.  ¨ How much sleep you get.  ¨ Any change to your diet or medicines.  · Try massage or other relaxation techniques.  · Limit stress.  · Sit up straight, and do not tense your muscles.  · Do not use tobacco products, including cigarettes, chewing tobacco, or e-cigarettes. If you need help quitting, ask your health care provider.  · Exercise regularly as told by your health care provider.  · Sleep on a regular schedule. Get 7-9 hours of sleep, or the amount recommended by your health care provider.  Contact a health care provider if:  · Your symptoms are not helped by medicine.  · You have a headache that is different from the usual headache.  · You have nausea or you vomit.  · You have a fever.  Get help right away if:  · Your headache becomes severe.  · You have repeated vomiting.  · You have a stiff neck.  · You have a loss of vision.  · You have problems with speech.  · You have pain in the eye or ear.  · You have muscular weakness or loss of muscle control.  · You lose your balance or have trouble walking.  · You feel faint or pass out.  · You have confusion.  This information is not intended to replace advice given to you by your health care provider. Make sure you discuss any questions you have with your health care provider.  Document Released: 12/18/2006 Document Revised: 05/25/2017 Document Reviewed: 04/11/2016  [employer] Interactive Patient Education © 2017 [employer]

## 2018-03-21 NOTE — ED NOTES
PIV removed. Catheter intact. Dressing applied. Bleeding controlled. D/C instructions reviewed with pt. Pt states understanding and need for follow-up in one day. Pt left ambulatory with a steady gait. Pts mother Laury will drive her home.

## 2018-03-21 NOTE — ED PROVIDER NOTES
"ED Provider Note    Scribed for Lester Berry M.D. by Alberto Valdivia. 3/21/2018  9:30 AM    Primary Care Provider: Maria Teresa Guerrier D.O.  Means of arrival: Walk in  History limited by: None    CHIEF COMPLAINT  Chief Complaint   Patient presents with   • Migraine       HPI  Bree Gage is a 37 y.o. female with a history of migraines who presents to the ED complaining of migraines onset 1 week ago. At that time, patient states she had just arrived home from work and was relaxing when the migraine started. She describes the pain as a \"burning poker\" pain to the left side of her head behind her left eye. She notes radiation of pain through her left cheek and to the back of her head. She then had associated dizziness and left sided numbness to her left head and face which is not new. Patient also had vomiting. She believes she had a fall this morning while in the shower. At that time, she began feeling lightheaded and suddenly found her self crouched down onto her knees on the floor. She thinks and feels like she hit her head but is unsure. Patient also mentions having generalized weakness and some sinus pressure. She notes having neck strain over the last several months. Patient denies any hematemesis, blood in stools, fevers, chills, diarrhea, abdominal pain, unintended weight loss, shortness of breath, or skin rashes. Negative depression. She denies taking any medications for weight loss. Patient has seen a neurologist for her migraines. She has been on multiple medications without any improvement to her migraines. She was able to hold down medications this morning.       REVIEW OF SYSTEMS  CONSTITUTIONAL:  Denies fever, chills, unintended weight loss  EYES:  Positive pain behind left eye.  HENT:  Positive sinus pressure  CARDIOVASCULAR:  No chest pain or palpitations  RESPIRATORY:  Denies shortness of breath  GI:  Negative hematemesis, blood in stools, diarrhea, abdominal pain  MUSCULOSKELETAL: " Positive neck strain (not new)  SKIN:  No rash  NEUROLOGIC:  Positive migraines, headache, dizziness (lightheadedness), generalized weakness  PSYCHIATRIC:  Denies depression.    C    PAST MEDICAL HISTORY  Past Medical History:   Diagnosis Date   • Alcohol addiction (CMS-HCC)    • Allergy    • Angina last 2 years    pt attributes to asthma   • Arthritis current    osteoarthritis   • Bronchitis     4-5x/year   • Depression    • Drug addiction (CMS-HCC)    • EBV positive mononucleosis syndrome    • Fracture of finger     rt index x 3   • Hemorrhoids    • Hyperlipidemia    • Hypothyroidism    • IBS (irritable bowel syndrome)    • Indigestion    • Iron deficiency anemia    • LBP (low back pain)     si joint epidural 2008   • Migraine    • Rectal fissure    • Varicose veins        FAMILY HISTORY  Family History   Problem Relation Age of Onset   • Hypertension Mother    • Hyperlipidemia Mother    • Alcohol/Drug Mother    • Psychiatry Mother    • Hypertension Father    • Hyperlipidemia Father    • Alcohol/Drug Father    • Psychiatry Father    • Cancer Maternal Grandfather    • Heart Disease Maternal Grandfather    • Heart Disease Paternal Grandfather    • Alcohol/Drug Paternal Grandfather        SOCIAL HISTORY   reports that she has quit smoking. Her smoking use included Cigarettes. She has a 6.50 pack-year smoking history. She uses smokeless tobacco. She reports that she uses drugs, including Methamphetamines. She reports that she does not drink alcohol.    SURGICAL HISTORY  Past Surgical History:   Procedure Laterality Date   • TRACHEOSTOMY  10/29/2009    Performed by NIDHI LARA at SURGERY [facility]   • GASTROSTOMY LAPAROSCOPIC  10/29/2009    Performed by NIDHI LARA at SURGERY [facility]       CURRENT MEDICATIONS  Home Medications     Reviewed by Rachel Mai R.N. (Registered Nurse) on 03/21/18 at 0922  Med List Status: Partial   Medication Last Dose Status   acetaminophen (TYLENOL) 500 MG  "TABS 2/16/2018 Active   ACETAMINOPHEN-CAFF-BUTALBITAL -40 MG Cap 2/16/2018 Active   azelastine (ASTELIN) 137 MCG/SPRAY nasal spray 2/16/2018 Active   cetirizine (ZYRTEC) 10 MG TABS 2/16/2018 Active   Cetirizine HCl 10 MG Cap 2/16/2018 Active   dexamethasone (DECADRON) 4 MG Tab  Active   ergotamine-caffeine (CAFERGOT) 1-100 MG Tab 2/16/2018 Active   fluticasone (FLONASE) 50 MCG/ACT nasal spray 2/16/2018 Active   hydrOXYzine (ATARAX) 25 MG Tab 1/9/2018 Active   hydrOXYzine (ATARAX) 25 MG Tab  Active   Lactobacillus Acidophilus Powder 2/16/2018 Active   Magnesium Oxide 500 MG Tab 2/16/2018 Active   MAGNESIUM PO  Active   methocarbamol (ROBAXIN) 500 MG Tab 11/30/2017 Active   methocarbamol (ROBAXIN) 500 MG Tab  Active   modafinil (PROVIGIL) 200 MG Tab 2/16/2018 Active   modafinil (PROVIGIL) 200 MG Tab 10/11/2017 Active   montelukast (SINGULAIR) 10 MG Tab 2/16/2018 Active   montelukast (SINGULAIR) 10 MG Tab  Active   naproxen (NAPROSYN) 500 MG Tab 2/16/2018 Active   naproxen (NAPROSYN) 500 MG TABS  Active   nitrofurantoin monohydr macro (MACROBID) 100 MG CAPS 2/16/2018 Active   ondansetron (ZOFRAN ODT) 4 MG TABLET DISPERSIBLE  Active   ondansetron (ZOFRAN ODT) 4 MG TBDP 2/16/2018 Active   ondansetron (ZOFRAN ODT) 4 MG TBDP Taking prn Active   ondansetron (ZOFRAN) 4 MG Tab tablet  Active   oxycodone-acetaminophen (PERCOCET) 5-325 MG Tab 2/16/2018 Active   oxycodone-acetaminophen (PERCOCET-10)  MG Tab  Active   promethazine (PHENERGAN) 25 MG Tab 2/16/2018 Active   QVAR 80 MCG/ACT inhaler 2/16/2018 Active   venlafaxine XR (EFFEXOR XR) 37.5 MG CAPSULE SR 24 HR  Active                ALLERGIES  Allergies   Allergen Reactions   • Ciprofloxacin    • Compazine Unspecified     agitation   • Diclofenac      Upset     • Epinephrine      palpitations   • Latex        PHYSICAL EXAM  VITAL SIGNS: /85   Pulse 97   Temp 37.2 °C (98.9 °F)   Resp 15   Ht 1.549 m (5' 1\")   Wt 81.6 kg (180 lb)   SpO2 97%   BMI 34.01 " kg/m²    Constitutional: Patient is awake and alert. No acute respiratory distress. Well developed, Well nourished, Non-toxic appearance.  HENT: Normocephalic. Tenderness to right head with palpation. No gross deformities., no bruising or redness. No Flores's Sign or [place] eyes, Bilateral external ears normal, Oropharynx pink and very dry with no exudates, Nose patent.  Eyes: PERRLA at 6 mm, EOMI, Sclera and conjunctiva clear, No discharge.   Neck:  Supple no nuchal rigidity, no thyromegaly or mass. Non-tender. Neck is anterior midline.  Lymphatic: No supraclavicular lymph nodes.   Cardiovascular: Heart is regular rate and rhythm no murmur, rub or thrill.   Thorax & Lungs: Chest is symmetrical, with good breath sounds. No wheezing or crackles. No respiratory distress, No chest tenderness.   Abdomen: Soft, No tenderness no hepatosplenomegaly there is no guarding or rebound, No masses, No pulsatile masses. Bowel sounds are present.  Skin: Warm, Dry, no petechia, purpura, or rash.   Back: Non tender with palpation, No CVA tenderness.   Extremities: No edema. Non tender.   Musculoskeletal: Good range of motion to wrists, elbows, shoulders, hips, knees, and ankles. Pulses 2+ radially and femorally. No gross deformities noted.   Neurologic: Alert & oriented to person, time, and place.  Strength is 5 over 5 and symmetric in bilateral upper and lower extremities.  Sensory is intact to light touch to face, arms, and legs.  DTRs are symmetrical in biceps brachioradialis, patella and Achilles.   Psychiatric: Normal affect        RADIOLOGY/PROCEDURES  No orders to display     The radiologist's interpretations of all radiological studies have been reviewed by me.     COURSE & MEDICAL DECISION MAKING  Pertinent Labs & Imaging studies reviewed. (See chart for details)    Differential diagnoses include but are not limited to intracranial bleed, meningitis, skull fractures, concussion, migraine headache, cluster headaches,  medications side of his, left eye imbalances    9:30 AM - Patient seen and examined at bedside.  Patient will be medicated with zofran 4 mg, reglan 10 mg, solumedrol 125 mg, benadryl 25 mg for her symptoms. The patient will be resuscitated with IV fluids due to tachycardia and nausea vomiting dry mucous membranes.      11:00 AM Patient reevaluated at bedside. Patient feels quite improved.     12:17 PM Patient reevaluated at bedside. .     Response to IV fluids: Patient feels much improved. She is now voiding. She's not tachycardic. No longer feels dehydrated.    1224. Resting comfortably headache is much improved. She would like to go home and follow up with her neurologist and primary care doctors next 1-2 days.    Decision Making  Patient has a long history of chronic headaches. Takes multiple medications at home. Now she's had a headache for several days and just doesn't seem to go away. She is now feels the hydrated and clinically she looks so her mucous membranes are dry she is tachycardic. Here the emergency room we gave her a cocktail of Benadryl Zofran Reglan Medrol. Her vomiting stopped. She received IV fluids until she is able to void. She seems to be doing much better although has not completely gone. We also tried her on some nasal cannula oxygen and she says this helped the most. She felt like she could go home and would like to follow-up with her primary care doctors as an outpatient. Again I do not believe clinically she has meningitis or intracranial bleed.  She does need very close follow-up as an outpatient.    Patient is stable for discharge home    FINAL IMPRESSION  1. Intractable headache  2. Dehydration  3. Vomiting    PLAN  1. Follow-up primary care doctor neurologist tomorrow  2. Continue to force fluids and juices  3. Work note for 3 days  4. Return to the emergency department for increased pains, fevers, vomiting or change in condition.     I, Alberto Valdivia (Scribgrace), am scribing for,  and in the presence of, Lester Berry M.D..    Electronically signed by: Alberto Valdivia (Scribe), 3/21/2018    I, Lester Berry M.D. personally performed the services described in this documentation, as scribed by Alberto Valdivia in my presence, and it is both accurate and complete.    The note accurately reflects work and decisions made by me.  Lester Berry  3/21/2018  3:30 PM

## 2018-03-21 NOTE — LETTER
Emergency Services     March 21, 2018    Patient: Bree Gage   YOB: 1980   Date of Visit: 3/21/2018       To Whom It May Concern:    Bree Gage was seen and treated in our emergency department on 3/21/2018. Please excuse from work 3/21/2018 - 3/24/2018.    Sincerely,     ZAIDA GAY M.D.  [facility], EMERGENCY DEPT  Dept: [phone]

## 2018-03-21 NOTE — ED TRIAGE NOTES
"Chief Complaint   Patient presents with   • Migraine     Patient states she has had migraine since Wednesday night, states history of migraines but says this is worse than normal. States she \"blacked out\" in shower this am and fell and hit her head. Patient was seen at [facility] Monday. Says \"nothing I'm doing will help.\"  Patient AAOx4, VSS.    "

## 2018-03-26 ENCOUNTER — TELEPHONE (OUTPATIENT)
Dept: NEUROLOGY | Facility: MEDICAL CENTER | Age: 38
End: 2018-03-26

## 2018-03-26 NOTE — TELEPHONE ENCOUNTER
Received a request from pharmacy for patient's Effexor. Sent the request to Jessica it was denied by jessica. When I called the patient she said that Jessica was the one that filled it last time, I advised the patient to try and get them from her primary as Jessica instructed.

## 2018-04-03 ENCOUNTER — TELEPHONE (OUTPATIENT)
Dept: NEUROLOGY | Facility: MEDICAL CENTER | Age: 38
End: 2018-04-03

## 2018-04-03 NOTE — TELEPHONE ENCOUNTER
----- Message from Bree Gage sent at 4/2/2018 10:47 AM PDT -----  Regarding: Prescription Question  Contact: [phone]  I transferred to a new pharmacy last year.      Can you please refill my Fiorecet to the [facility] in [place] on [address]??  Thank you

## 2018-04-05 ENCOUNTER — OFFICE VISIT (OUTPATIENT)
Dept: URGENT CARE | Facility: PHYSICIAN GROUP | Age: 38
End: 2018-04-05
Payer: COMMERCIAL

## 2018-04-05 VITALS
HEART RATE: 96 BPM | OXYGEN SATURATION: 98 % | SYSTOLIC BLOOD PRESSURE: 120 MMHG | BODY MASS INDEX: 34.36 KG/M2 | RESPIRATION RATE: 16 BRPM | WEIGHT: 182 LBS | DIASTOLIC BLOOD PRESSURE: 80 MMHG | HEIGHT: 61 IN | TEMPERATURE: 97.9 F

## 2018-04-05 DIAGNOSIS — Z87.01 HISTORY OF ASPIRATION PNEUMONIA: ICD-10-CM

## 2018-04-05 DIAGNOSIS — R50.9 FEVER, UNSPECIFIED FEVER CAUSE: ICD-10-CM

## 2018-04-05 DIAGNOSIS — R05.9 COUGH: ICD-10-CM

## 2018-04-05 PROCEDURE — 99214 OFFICE O/P EST MOD 30 MIN: CPT | Performed by: FAMILY MEDICINE

## 2018-04-05 RX ORDER — DOXYCYCLINE HYCLATE 100 MG
100 TABLET ORAL 2 TIMES DAILY
Qty: 20 TAB | Refills: 0 | Status: SHIPPED | OUTPATIENT
Start: 2018-04-05 | End: 2018-04-15

## 2018-04-05 RX ORDER — BENZONATATE 200 MG/1
200 CAPSULE ORAL 3 TIMES DAILY PRN
Qty: 30 CAP | Refills: 0 | Status: SHIPPED | OUTPATIENT
Start: 2018-04-05 | End: 2018-06-22

## 2018-04-05 ASSESSMENT — ENCOUNTER SYMPTOMS
WEIGHT LOSS: 0
MYALGIAS: 1
EYE DISCHARGE: 0
SENSORY CHANGE: 0
FOCAL WEAKNESS: 0
EYE REDNESS: 0

## 2018-04-05 NOTE — LETTER
April 5, 2018         Patient: Bree Gage   YOB: 1980   Date of Visit: 4/5/2018           To Whom it May Concern:    Bree Gage was seen in my clinic on 4/5/2018. Please excuse 4/5 and 4/6/2018.      Sincerely,           Derrell Jarrell M.D.  Electronically Signed

## 2018-04-05 NOTE — PROGRESS NOTES
"Subjective:      Bree Gage is a 37 y.o. female who presents with Fever (cough 1.5 week )            1.5 weeks productive cough with blood in sputum. Fever [id number]. Chills. Fatigue. Diaphoresis. Mild SOB. +wheeze. Using qvar but not rescue inhaler. +PMH asthma as well as pneumonia with respiratory failure. Nasal congestion/sinus pressure. No other aggravating or alleviating factors.          Review of Systems   Constitutional: Negative for malaise/fatigue and weight loss.   HENT: Negative for ear discharge and ear pain.    Eyes: Negative for discharge and redness.   Musculoskeletal: Positive for myalgias. Negative for joint pain.   Skin: Negative for itching and rash.   Neurological: Negative for sensory change and focal weakness.     .  Medications, Allergies, and current problem list reviewed today in Epic       Objective:     /80   Pulse 96   Temp 36.6 °C (97.9 °F)   Resp 16   Ht 1.549 m (5' 1\")   Wt 82.6 kg (182 lb)   SpO2 98%   BMI 34.39 kg/m²      Physical Exam   Constitutional: She appears well-developed and well-nourished. No distress.   HENT:   Head: Normocephalic and atraumatic.   Nasal congestion  Pharynx red without exudate     Eyes: Conjunctivae are normal.   Neck: Neck supple.   Cardiovascular: Normal rate, regular rhythm and normal heart sounds.    Pulmonary/Chest: Effort normal and breath sounds normal.   Lymphadenopathy:     She has no cervical adenopathy.   Neurological:   Speech is clear. Patient is appropriate and cooperative.    Skin: Skin is warm and dry. No rash noted.               Assessment/Plan:     1. Cough  doxycycline (VIBRAMYCIN) 100 MG Tab    benzonatate (TESSALON) 200 MG capsule   2. Fever, unspecified fever cause  doxycycline (VIBRAMYCIN) 100 MG Tab   3. History of aspiration pneumonia  doxycycline (VIBRAMYCIN) 100 MG Tab     Differential diagnosis, natural history, supportive care, and indications for immediate follow-up discussed at length.       "

## 2018-04-30 ENCOUNTER — OFFICE VISIT (OUTPATIENT)
Dept: NEUROLOGY | Facility: MEDICAL CENTER | Age: 38
End: 2018-04-30
Payer: COMMERCIAL

## 2018-04-30 VITALS
HEART RATE: 86 BPM | BODY MASS INDEX: 34.71 KG/M2 | DIASTOLIC BLOOD PRESSURE: 70 MMHG | WEIGHT: 183.86 LBS | SYSTOLIC BLOOD PRESSURE: 106 MMHG | HEIGHT: 61 IN

## 2018-04-30 DIAGNOSIS — G43.111 INTRACTABLE MIGRAINE WITH AURA WITH STATUS MIGRAINOSUS: ICD-10-CM

## 2018-04-30 DIAGNOSIS — G44.001 INTRACTABLE CLUSTER HEADACHE SYNDROME, UNSPECIFIED CHRONICITY PATTERN: ICD-10-CM

## 2018-04-30 PROCEDURE — 99215 OFFICE O/P EST HI 40 MIN: CPT | Performed by: PHYSICIAN ASSISTANT

## 2018-04-30 RX ORDER — INDOMETHACIN 50 MG/1
50 CAPSULE ORAL 2 TIMES DAILY
COMMUNITY
End: 2021-01-19

## 2018-04-30 RX ORDER — ERGOTAMINE TARTRATE AND CAFFEINE 1; 100 MG/1; MG/1
1 TABLET, FILM COATED ORAL
Qty: 30 TAB | Refills: 11 | Status: SHIPPED | OUTPATIENT
Start: 2018-04-30 | End: 2019-04-17

## 2018-04-30 RX ORDER — ZONISAMIDE 25 MG/1
25 CAPSULE ORAL DAILY
Qty: 120 CAP | Refills: 3 | Status: SHIPPED | OUTPATIENT
Start: 2018-04-30 | End: 2018-05-08 | Stop reason: SDUPTHER

## 2018-04-30 RX ORDER — DIHYDROERGOTAMINE MESYLATE 4 MG/ML
SPRAY NASAL
Qty: 8 AMPULE | Refills: 6 | Status: SHIPPED | OUTPATIENT
Start: 2018-04-30 | End: 2019-04-17

## 2018-04-30 NOTE — PROGRESS NOTES
Cc:  Migraine followup specifically chronic migraine and recent episode of status migrainosis and hemiplegic migraine.    Last office visit:    Review of diary: didn't bring it.  But Continues to have daily headache/migraine.  With an episode of status migrainosis lasting > 3 days with hemiplegic migraine.     Acute treatment: did not like the way the zembrace made her feel.  Wants to stay away from triptans.  Couldn't obtain ergot without caffeine from pharmacy     Prevention:    Vitamin started: b2 STARTED BUT it takes about 3 months.    Exercise: working on it.  started yoga and a body blade workout system  Diet: beginning to implement whole 30 diet with one meal daily at this time.  Align PT:  couldn't get her in around the work schedule.  To consider acupuncture or PT again after January 1.       Thoughts on mechanical devices:  borrowed the cephaly.  Having a difficult time letting it ramp up and also problem wearing it for the 20 minutes.  Unsure if she will continue     Dilated eye exam:  Dec 11 eye exam.     Status of MRI brain and neck: normal results     ONB authorized:  Will come in for one with next intractable migraine episode     EEG scheduled?  - pt plans to schedule when she can take a day off of work.       A/P:  Chronic migraine/recent episode of status migrainosis with hemiplegic migraine:  couldn't get the cafergot and so I reordered with caffeine but spent considerable time educating patient on caffeine content and that her narcotic from pain management also contains caffeine.  If the ergot helps she will ask pain management for butalbital without caffeine additive  Supplements:  Handout on gliacin as she may not tolerate the B2 as it is bothering her bladder  Pt requested referral to ketamine clinic in [facility].  They don't specifically treat migraine but they do offer treatment for depression and fibromyalgia and she wishes to try.  They have an online website and I made the referral through  their online process.  Pt will contact them to follow up.  Concern regarding missed days from [employer] and need for FMLA paperwork.  I gave her the info on policy and she will fax forms to us for me to complete.      Interval History:    FMLA paperwork completed and BONB performed.    She was out all last week due to status migrainosis.  She did not go to ER because they don't do anything typically.  She went to pain management last week.  She was put on indomethacin 50 mg BID and is doing better.  She is also taking a PPI.  Discussed spenopalatine blocks, tms, device and a whole list of subjects she had put into her phone.  Vertigo - still gets as aura and very disabiling      Acute treatment:  cafergot? - tried cafergot last week with migraine but cannot take that often due to caffeine level.  Also uses migranol sometimes with some benefit.      Prevention:  Still taking B2? - stopped B2 due to spasming bladder.  She added Coq10 and gabba at night and melatonin.  Exercise? Still struggling  Diet? Did whole30 diet and identified a few more triggers.    Acupuncture? Tried and failed      Dec 11 dilated eye exam?  All OK    Ketamine clinic? - too expensive $400 per week.    They used oxygen in clinic and right away headache improved.  I will order for home use.  I will give you handout for TMS device - mychart me if you want to try.  Discussed cost which is significant.  Recommend sending you to headache specialist in [facility] if cgrp not an option when it becomes available.  Lidocaine nasal spray?  Discuss with pain management as well as fioricet which she also wishes to have in order to function when she goes into status which happens about once per month.    Total time with this visit:  40   Minutes face-to-face with patient. More than 50% of this visit was spent educating patient on their illness and/or coordinating care, as detailed above

## 2018-04-30 NOTE — LETTER
April 30, 2018       Patient: Bree Gage   YOB: 1980   Date of Visit: 4/30/2018         To Whom It May Concern:    It is my medical opinion that Bree Gage be released to return to work without restriction May 1, 2018.    If you have any questions or concerns, please don't hesitate to call [phone]          Sincerely,          Jessica Krishnamurthy P.A.-C.

## 2018-05-08 RX ORDER — ZONISAMIDE 25 MG/1
25 CAPSULE ORAL DAILY
Qty: 120 CAP | Refills: 3 | Status: SHIPPED | OUTPATIENT
Start: 2018-05-08 | End: 2019-04-17

## 2018-05-11 ENCOUNTER — HOSPITAL ENCOUNTER (EMERGENCY)
Facility: MEDICAL CENTER | Age: 38
End: 2018-05-11
Attending: EMERGENCY MEDICINE
Payer: COMMERCIAL

## 2018-05-11 VITALS
OXYGEN SATURATION: 97 % | SYSTOLIC BLOOD PRESSURE: 125 MMHG | WEIGHT: 183.42 LBS | HEART RATE: 76 BPM | BODY MASS INDEX: 34.63 KG/M2 | HEIGHT: 61 IN | TEMPERATURE: 98.2 F | RESPIRATION RATE: 18 BRPM | DIASTOLIC BLOOD PRESSURE: 81 MMHG

## 2018-05-11 DIAGNOSIS — R51.9 NONINTRACTABLE HEADACHE, UNSPECIFIED CHRONICITY PATTERN, UNSPECIFIED HEADACHE TYPE: ICD-10-CM

## 2018-05-11 PROCEDURE — 96374 THER/PROPH/DIAG INJ IV PUSH: CPT

## 2018-05-11 PROCEDURE — 96375 TX/PRO/DX INJ NEW DRUG ADDON: CPT

## 2018-05-11 PROCEDURE — 700111 HCHG RX REV CODE 636 W/ 250 OVERRIDE (IP): Performed by: EMERGENCY MEDICINE

## 2018-05-11 PROCEDURE — 99284 EMERGENCY DEPT VISIT MOD MDM: CPT

## 2018-05-11 RX ORDER — METOCLOPRAMIDE HYDROCHLORIDE 5 MG/ML
10 INJECTION INTRAMUSCULAR; INTRAVENOUS ONCE
Status: COMPLETED | OUTPATIENT
Start: 2018-05-11 | End: 2018-05-11

## 2018-05-11 RX ORDER — DIPHENHYDRAMINE HYDROCHLORIDE 50 MG/ML
25 INJECTION INTRAMUSCULAR; INTRAVENOUS ONCE
Status: COMPLETED | OUTPATIENT
Start: 2018-05-11 | End: 2018-05-11

## 2018-05-11 RX ORDER — DEXAMETHASONE SODIUM PHOSPHATE 10 MG/ML
10 INJECTION, SOLUTION INTRAMUSCULAR; INTRAVENOUS ONCE
Status: COMPLETED | OUTPATIENT
Start: 2018-05-11 | End: 2018-05-11

## 2018-05-11 RX ADMIN — METOCLOPRAMIDE 10 MG: 5 INJECTION, SOLUTION INTRAMUSCULAR; INTRAVENOUS at 09:37

## 2018-05-11 RX ADMIN — DIPHENHYDRAMINE HYDROCHLORIDE 25 MG: 50 INJECTION, SOLUTION INTRAMUSCULAR; INTRAVENOUS at 09:37

## 2018-05-11 RX ADMIN — DEXAMETHASONE SODIUM PHOSPHATE 10 MG: 10 INJECTION, SOLUTION INTRAMUSCULAR; INTRAVENOUS at 09:37

## 2018-05-11 ASSESSMENT — PAIN SCALES - GENERAL: PAINLEVEL_OUTOF10: 6

## 2018-05-11 NOTE — ED NOTES
Reviewed discharge with patient, she verbalized understanding.  Able to ambulate out.  Patient has a ride home.

## 2018-05-11 NOTE — ED TRIAGE NOTES
"38 y/o female ambulatory to triage with c/o dizziness that began 3 days ago pt states she suffers from migraine headaches and started on a new medication 14 days ago (Indomethacin 50 mg BID). Pt also c/o migraine but states \"I always have one, it's not as bad as when I have to come here for it\". Dizziness is better when laying down, she states she has been in bed x 2 days.   "

## 2018-05-11 NOTE — ED PROVIDER NOTES
ED Provider Note    CHIEF COMPLAINT  Chief Complaint   Patient presents with   • Dizziness   • Migraine       HPI  Bree Gage is a 37 y.o. female who presents with headache. Patient suffers from chronic daily headache. She reports that her chronic daily headache is worsened. She deals with migraines which are hemiplegic and frequently will have neurologic symptoms. Headache began worsening of few days ago. She was seen by her neurologist. She was told this was related to her migraine headache. She's been taking and amoxicillin as prescribed by primary. Her only complaint currently is of an off-balance sensation. She has had this before with her migraines, but this feels worse. She denies weakness numbness or other neurologic symptoms. No ringing or roaring in her ears. She has not had fever. No trauma or thunderclap.    REVIEW OF SYSTEMS  As above    All other systems are reviewed and negative    PAST MEDICAL HISTORY  Past Medical History:   Diagnosis Date   • Alcohol addiction (HCC)    • Allergy    • Angina last 2 years    pt attributes to asthma   • Arthritis current    osteoarthritis   • Bronchitis     4-5x/year   • Depression    • Drug addiction (HCC)    • EBV positive mononucleosis syndrome    • Fracture of finger     rt index x 3   • Hemorrhoids    • Hyperlipidemia    • Hypothyroidism    • IBS (irritable bowel syndrome)    • Indigestion    • Iron deficiency anemia    • LBP (low back pain)     si joint epidural 2008   • Migraine    • Rectal fissure    • Varicose veins        FAMILY HISTORY  Family History   Problem Relation Age of Onset   • Hypertension Mother    • Hyperlipidemia Mother    • Alcohol/Drug Mother    • Psychiatry Mother    • Hypertension Father    • Hyperlipidemia Father    • Alcohol/Drug Father    • Psychiatry Father    • Cancer Maternal Grandfather    • Heart Disease Maternal Grandfather    • Heart Disease Paternal Grandfather    • Alcohol/Drug Paternal Grandfather        SOCIAL  "HISTORY  Social History   Substance Use Topics   • Smoking status: Former Smoker     Packs/day: 0.50     Years: 13.00     Types: Cigarettes   • Smokeless tobacco: Never Used      Comment: 1/2 every other day   • Alcohol use No       SURGICAL HISTORY  Past Surgical History:   Procedure Laterality Date   • TRACHEOSTOMY  10/29/2009    Performed by NIDHI LARA at SURGERY [facility]   • GASTROSTOMY LAPAROSCOPIC  10/29/2009    Performed by NIDHI LARA at SURGERY [facility]       CURRENT MEDICATIONS  Home Medications    **Home medications have not yet been reviewed for this encounter**         ALLERGIES  Allergies   Allergen Reactions   • Ciprofloxacin    • Compazine Unspecified     agitation   • Diclofenac      Upset     • Epinephrine      palpitations   • Latex        PHYSICAL EXAM  VITAL SIGNS: /81   Pulse 76   Temp 36.8 °C (98.2 °F) (Temporal)   Resp 18   Ht 1.549 m (5' 1\")   Wt 83.2 kg (183 lb 6.8 oz)   SpO2 97%   BMI 34.66 kg/m²   Constitutional:  Well developed, Well nourished, No acute distress, Non-toxic appearance.   HENT: Head atraumatic, both tympanic membranes are clear. There is hair in the external auditory canal. Does not appear to have any hair touching the tympanic membranes.  Eyes: PERRL, EOMI, funduscopic exam is normal without papilledema  Neck: Normal range of motion, No tenderness, Supple, No stridor.   Lymphatic: No lymphadenopathy noted.   Cardiovascular: Normal heart rate, Normal rhythm, No murmurs, No rubs, No gallops.   Thorax & Lungs: Normal breath sounds, No respiratory distress, No wheezing, No chest tenderness.   Skin: Warm, Dry, No erythema, No rash.   Extremities: Intact distal pulses, No edema, No tenderness, No cyanosis, No clubbing.   Neurologic: Alert & oriented x 3, Normal motor function, Normal sensory function, No focal deficits noted, normal gait  Psychiatric: Affect normal, Judgment normal, Mood normal.       COURSE & MEDICAL DECISION " MAKING  Patient without fever, has no neck stiffness, gradual onset, not worst of life, no new acute neurologic symptoms, no trauma, no eye complaints or findings. No suggestion of meningitis, subarachnoid hemorrhage, tumor, central venous thrombosis, stroke, glaucoma. She was treated with Benadryl, Reglan, Decadron. She reported symptomatic improvement. At this point we'll continue current regimen as prescribed by her primary provider. She will return to the ER for any acute neurologic symptoms, change from her typical headache.    FINAL IMPRESSION  1. Acute recurrent cephalgia, migraine variant    This dictation was created using voice recognition software. The accuracy of the dictation is limited to the abilities of the software. I expect there may be some errors of grammar and possibly content. The nursing notes were reviewed and certain aspects of this information were incorporated into this note.      Electronically signed by: Ja Jones, 5/11/2018 2:27 PM

## 2018-05-23 ENCOUNTER — OFFICE VISIT (OUTPATIENT)
Dept: MEDICAL GROUP | Facility: MEDICAL CENTER | Age: 38
End: 2018-05-23
Payer: COMMERCIAL

## 2018-05-23 VITALS
TEMPERATURE: 97.6 F | BODY MASS INDEX: 34.36 KG/M2 | DIASTOLIC BLOOD PRESSURE: 80 MMHG | RESPIRATION RATE: 16 BRPM | HEART RATE: 103 BPM | HEIGHT: 61 IN | WEIGHT: 182 LBS | SYSTOLIC BLOOD PRESSURE: 118 MMHG | OXYGEN SATURATION: 96 %

## 2018-05-23 DIAGNOSIS — F33.1 MODERATE EPISODE OF RECURRENT MAJOR DEPRESSIVE DISORDER (HCC): ICD-10-CM

## 2018-05-23 DIAGNOSIS — F10.11 HISTORY OF ALCOHOL ABUSE: ICD-10-CM

## 2018-05-23 DIAGNOSIS — E66.9 OBESITY, CLASS I, BMI 30.0-34.9 (SEE ACTUAL BMI): ICD-10-CM

## 2018-05-23 DIAGNOSIS — Z86.2 HISTORY OF ANEMIA: ICD-10-CM

## 2018-05-23 DIAGNOSIS — J45.20 MILD INTERMITTENT ASTHMA IN ADULT WITHOUT COMPLICATION: ICD-10-CM

## 2018-05-23 DIAGNOSIS — E22.1 HYPERPROLACTINEMIA (HCC): ICD-10-CM

## 2018-05-23 DIAGNOSIS — F15.11 HISTORY OF METHAMPHETAMINE ABUSE (HCC): ICD-10-CM

## 2018-05-23 DIAGNOSIS — Z86.39 HISTORY OF HYPOTHYROIDISM: ICD-10-CM

## 2018-05-23 PROBLEM — J45.909 ASTHMA IN ADULT WITHOUT COMPLICATION: Status: ACTIVE | Noted: 2018-05-23

## 2018-05-23 PROCEDURE — 99214 OFFICE O/P EST MOD 30 MIN: CPT | Performed by: NURSE PRACTITIONER

## 2018-05-23 RX ORDER — VENLAFAXINE HYDROCHLORIDE 150 MG/1
150 CAPSULE, EXTENDED RELEASE ORAL DAILY
Qty: 30 CAP | Refills: 3 | Status: SHIPPED | OUTPATIENT
Start: 2018-05-23 | End: 2018-10-05 | Stop reason: SDUPTHER

## 2018-05-24 NOTE — ASSESSMENT & PLAN NOTE
Failed buspar, paxil, prozac, seroquel, depakote, cymbalta  Currently on effexor 75 mg daily   More depression since loss of job, feeling sad, tearful  Thinks increase of medication would be helpful  No SI/HI, no history of manic behavior, no appetite changes

## 2018-05-24 NOTE — ASSESSMENT & PLAN NOTE
Diagnosed in 2010, treated with bromocriptine for a period of time, has now been off medication for the last several years

## 2018-05-24 NOTE — ASSESSMENT & PLAN NOTE
Previously on levothyroxine,  of medication the last few years and reportedly with normal labs  She does have complaints of chronic fatigue, difficulty with her weight  No constipation, hair loss, heat or cold intolerance

## 2018-05-24 NOTE — ASSESSMENT & PLAN NOTE
Migraines since age 7 but daily problem for the last few months  Was on FMLA from [employer]. Was then told not to come back to work until her issues were managed. She thinks that she may have to go on disability  Has tried several preventative medications without benefit. No change with Botox injections   Now using indomethacin daily, cafergot, zonisamide

## 2018-05-25 NOTE — PROGRESS NOTES
Subjective:     Chief Complaint   Patient presents with   • Establish Care   • Depression   • Referral Needed     Bree Gage is a 37 y.o. female to establish care. We discussed:    Chronic migraine  Migraines since age 7 but daily problem for the last few months  Was on FMLA from [employer]. Was then told not to come back to work until her issues were managed. She thinks that she may have to go on disability  Has tried several preventative medications without benefit. No change with Botox injections   Now using indomethacin daily, cafergot, zonisamide  Is.  History of alcohol abuse  No etoh since 2012    History of methamphetamine abuse  Clean since 2001  No hx of IV use    Obesity, Class I, BMI 30.0-34.9 (see actual BMI)  Changed her diet to eat healthier but no success with weight loss    Moderate episode of recurrent major depressive disorder (HCC)  Failed buspar, paxil, prozac, seroquel, depakote, cymbalta  Currently on effexor 75 mg daily   More depression since loss of job, feeling sad, tearful  Thinks increase of medication would be helpful  No SI/HI, no history of manic behavior, no appetite changes    Asthma in adult without complication  On singulair and rare use of albuterol  No chronic cough, no waking at night with symptoms    Hyperprolactinemia  Diagnosed in 2010, treated with bromocriptine for a period of time, has now been off medication for the last several years     History of hypothyroidism  Previously on levothyroxine,  of medication the last few years and reportedly with normal labs  She does have complaints of chronic fatigue, difficulty with her weight  No constipation, hair loss, heat or cold intolerance       Current medicines (including changes today)  Current Outpatient Prescriptions   Medication Sig Dispense Refill   • venlafaxine (EFFEXOR-XR) 150 MG extended-release capsule Take 1 Cap by mouth every day. 30 Cap 3   • zonisamide (ZONEGRAN) 25 MG capsule Take 1 Cap by mouth  every day. Titrate weekly to dose of 100 mg BID with schedule provided to pt in clinic. 120 Cap 3   • indomethacin (INDOCIN) 50 MG Cap Take 50 mg by mouth 2 Times a Day.     • ergotamine-caffeine (CAFERGOT) 1-100 MG Tab Take 1 Tab by mouth every 1 hour as needed for Headache. Monitor caffeine intake with this medication.  Maximum use 6 tabs in one day. 30 Tab 11   • dihydroergotamine (MIGRANAL) 4 MG/ML nasal spray 1 spray each nostril - 15 minutes apart.  Can repeat 2 hours later if headache persists. Max 8 sprays weekly. 8 Ampule 6   • benzonatate (TESSALON) 200 MG capsule Take 1 Cap by mouth 3 times a day as needed for Cough. 30 Cap 0   • dexamethasone (DECADRON) 4 MG Tab TAKE THREE TABLETS BY MOUTH ON DAY 1, THEN TAKE TWO TABLETS ON DAY 2, THEN ONE TABLET ON DAY THREE. (FOR INTRACTABLE MIGRAINE) 6 Tab 1   • venlafaxine XR (EFFEXOR XR) 37.5 MG CAPSULE SR 24 HR TAKE ONE CAPSULE BY MOUTH DAILY 30 Cap 0   • ondansetron (ZOFRAN) 4 MG Tab tablet TAKE ONE TABLET BY MOUTH EVERY 8 HOURS AS NEEDED FOR NAUSEA AND VOMITING 12 Tab 11   • promethazine (PHENERGAN) 25 MG Tab Take 1 Tab by mouth every 6 hours as needed for Nausea/Vomiting. 30 Tab 6   • fluticasone (FLONASE) 50 MCG/ACT nasal spray Spray 1 Spray in nose.     • hydrOXYzine (ATARAX) 25 MG Tab Take 25 mg by mouth.     • Lactobacillus Acidophilus Powder Take  by mouth.     • Magnesium Oxide 500 MG Tab Take  by mouth.     • methocarbamol (ROBAXIN) 500 MG Tab Take 500 mg by mouth.     • montelukast (SINGULAIR) 10 MG Tab Take 4 mg by mouth.     • oxycodone-acetaminophen (PERCOCET-10)  MG Tab Take  by mouth.     • Cetirizine HCl 10 MG Cap Take 10 mg by mouth.     • naproxen (NAPROSYN) 500 MG Tab Take 500 mg by mouth.     • ondansetron (ZOFRAN ODT) 4 MG TABLET DISPERSIBLE Take 2 mg by mouth.     • azelastine (ASTELIN) 137 MCG/SPRAY nasal spray      • QVAR 80 MCG/ACT inhaler      • ACETAMINOPHEN-CAFF-BUTALBITAL -40 MG Cap      • hydrOXYzine (ATARAX) 25 MG Tab     "  • methocarbamol (ROBAXIN) 500 MG Tab      • modafinil (PROVIGIL) 200 MG Tab      • montelukast (SINGULAIR) 10 MG Tab      • oxycodone-acetaminophen (PERCOCET) 5-325 MG Tab      • MAGNESIUM PO Take  by mouth.     • ondansetron (ZOFRAN ODT) 4 MG TBDP Take 1 Tab by mouth every 8 hours as needed for Nausea/Vomiting. 12 Tab 0   • nitrofurantoin monohydr macro (MACROBID) 100 MG CAPS Take 100 mg by mouth 2 times a day.     • naproxen (NAPROSYN) 500 MG TABS Take 500 mg by mouth as needed.     • acetaminophen (TYLENOL) 500 MG TABS Take 500-1,000 mg by mouth every 6 hours as needed. Up to 8 a day     • ondansetron (ZOFRAN ODT) 4 MG TBDP Take 0.5 Tabs by mouth every 6 hours as needed for Nausea/Vomiting. 5 Each 2   • cetirizine (ZYRTEC) 10 MG TABS Take 10 mg by mouth every day.         No current facility-administered medications for this visit.      She  has a past medical history of Alcohol addiction ([facility]); Allergy; Angina (last 2 years); Arthritis (current); Bronchitis; Depression; Drug addiction (HCC); EBV positive mononucleosis syndrome; Fracture of finger; Hemorrhoids; Hyperlipidemia; Hypothyroidism; IBS (irritable bowel syndrome); Indigestion; Iron deficiency anemia; LBP (low back pain); Migraine; Rectal fissure; and Varicose veins. She also has no past medical history of Arrhythmia; Cancer (HCC); CATARACT; Congestive heart failure (HCC); COPD; Diabetes; Dialysis; Glaucoma; Heart murmur; Heart valve disease; Hypertension; Jaundice; Myocardial infarct (HCC); Other specified symptom associated with female genital organs; Pacemaker; Personal history of venous thrombosis and embolism; Pneumonia; Renal disorder; Rheumatic fever; Seizure (HCC); Stroke (HCC); Unspecified hemorrhagic conditions; or Unspecified urinary incontinence.    ROS included above     Objective:     Blood pressure 118/80, pulse (!) 103, temperature 36.4 °C (97.6 °F), resp. rate 16, height 1.549 m (5' 1\"), weight 82.6 kg (182 lb), SpO2 96 %, not " currently breastfeeding. Body mass index is 34.39 kg/m².     Physical Exam:  General: Alert, oriented in no acute distress.  Eye contact is good, speech is normal, affect tearful  Lungs: clear to auscultation bilaterally, normal effort, no wheeze/ rhonchi/ rales.  CV: regular rate and rhythm, S1, S2, no murmur  Abdomen: soft, nontender  Ext: no edema, color normal, vascularity normal, temperature normal    Assessment and Plan:   The following treatment plan was discussed   1. Chronic migraine  long-standing history with recent worsening, out of work at this time due to symptoms. Has failed several treatments. Continue follow-up with neurology    2. History of Alcohol abuse  No alcohol use since 2012, encouraged to continue    3. History of methamphetamine abuse  Clean since 2001    4. Obesity, Class I, BMI 30.0-34.9 (see actual BMI)  Stable    5. Moderate episode of recurrent major depressive disorder (HCC)  Uncontrolled, feeling that she needs medication adjustment. Increase venlafaxine to 150 mg daily. Encouraged counseling. We will follow-up on this in 3 weeks  venlafaxine (EFFEXOR-XR) 150 MG extended-release capsule   6. Mild intermittent asthma in adult without complication  Stable    7. Hyperprolactinemia (HCC)  Off medication the last few years. Reevaluate  COMP METABOLIC PANEL    PROLACTIN   8. History of anemia  Update labs  CBC WITH DIFFERENTIAL    IRON/TOTAL IRON BIND   9. History of hypothyroidism  Off medication at this time  TSH+FREE T4       Followup: 3 weeks and pending labs         Please note that this dictation was created using voice recognition software. I have worked with consultants from the vendor as well as technical experts from [employer] [facility] [employer] to optimize the interface. I have made every reasonable attempt to correct obvious errors, but I expect that there are errors of grammar and possibly content that I did not discover before finalizing the note.

## 2018-05-29 ENCOUNTER — APPOINTMENT (OUTPATIENT)
Dept: NEUROLOGY | Facility: MEDICAL CENTER | Age: 38
End: 2018-05-29
Payer: COMMERCIAL

## 2018-06-14 ENCOUNTER — TELEPHONE (OUTPATIENT)
Dept: NEUROLOGY | Facility: MEDICAL CENTER | Age: 38
End: 2018-06-14

## 2018-06-14 NOTE — TELEPHONE ENCOUNTER
Called [employer] to see what the status of Pt's order is at. Pt answered my call and I let her know that the medication will be available to her by the end of next week.

## 2018-06-15 ENCOUNTER — HOSPITAL ENCOUNTER (OUTPATIENT)
Dept: LAB | Facility: MEDICAL CENTER | Age: 38
End: 2018-06-15
Attending: NURSE PRACTITIONER
Payer: COMMERCIAL

## 2018-06-15 DIAGNOSIS — E22.1 HYPERPROLACTINEMIA (HCC): ICD-10-CM

## 2018-06-15 DIAGNOSIS — Z86.2 HISTORY OF ANEMIA: ICD-10-CM

## 2018-06-15 LAB
ALBUMIN SERPL BCP-MCNC: 4.5 G/DL (ref 3.2–4.9)
ALBUMIN/GLOB SERPL: 1.5 G/DL
ALP SERPL-CCNC: 41 U/L (ref 30–99)
ALT SERPL-CCNC: 7 U/L (ref 2–50)
ANION GAP SERPL CALC-SCNC: 8 MMOL/L (ref 0–11.9)
AST SERPL-CCNC: 14 U/L (ref 12–45)
BASOPHILS # BLD AUTO: 0.3 % (ref 0–1.8)
BASOPHILS # BLD: 0.02 K/UL (ref 0–0.12)
BILIRUB SERPL-MCNC: 0.3 MG/DL (ref 0.1–1.5)
BUN SERPL-MCNC: 10 MG/DL (ref 8–22)
CALCIUM SERPL-MCNC: 8.9 MG/DL (ref 8.5–10.5)
CHLORIDE SERPL-SCNC: 106 MMOL/L (ref 96–112)
CO2 SERPL-SCNC: 26 MMOL/L (ref 20–33)
CREAT SERPL-MCNC: 0.74 MG/DL (ref 0.5–1.4)
EOSINOPHIL # BLD AUTO: 0.17 K/UL (ref 0–0.51)
EOSINOPHIL NFR BLD: 2.8 % (ref 0–6.9)
ERYTHROCYTE [DISTWIDTH] IN BLOOD BY AUTOMATED COUNT: 40.8 FL (ref 35.9–50)
GLOBULIN SER CALC-MCNC: 3 G/DL (ref 1.9–3.5)
GLUCOSE SERPL-MCNC: 110 MG/DL (ref 65–99)
HCT VFR BLD AUTO: 41.7 % (ref 37–47)
HGB BLD-MCNC: 13.4 G/DL (ref 12–16)
IMM GRANULOCYTES # BLD AUTO: 0.01 K/UL (ref 0–0.11)
IMM GRANULOCYTES NFR BLD AUTO: 0.2 % (ref 0–0.9)
IRON SATN MFR SERPL: 17 % (ref 15–55)
IRON SERPL-MCNC: 66 UG/DL (ref 40–170)
LYMPHOCYTES # BLD AUTO: 1.79 K/UL (ref 1–4.8)
LYMPHOCYTES NFR BLD: 29.6 % (ref 22–41)
MCH RBC QN AUTO: 29 PG (ref 27–33)
MCHC RBC AUTO-ENTMCNC: 32.1 G/DL (ref 33.6–35)
MCV RBC AUTO: 90.3 FL (ref 81.4–97.8)
MONOCYTES # BLD AUTO: 0.57 K/UL (ref 0–0.85)
MONOCYTES NFR BLD AUTO: 9.4 % (ref 0–13.4)
NEUTROPHILS # BLD AUTO: 3.48 K/UL (ref 2–7.15)
NEUTROPHILS NFR BLD: 57.7 % (ref 44–72)
NRBC # BLD AUTO: 0 K/UL
NRBC BLD-RTO: 0 /100 WBC
PLATELET # BLD AUTO: 360 K/UL (ref 164–446)
PMV BLD AUTO: 10 FL (ref 9–12.9)
POTASSIUM SERPL-SCNC: 4.2 MMOL/L (ref 3.6–5.5)
PROLACTIN SERPL-MCNC: 8.04 NG/ML (ref 2.8–26)
PROT SERPL-MCNC: 7.5 G/DL (ref 6–8.2)
RBC # BLD AUTO: 4.62 M/UL (ref 4.2–5.4)
SODIUM SERPL-SCNC: 140 MMOL/L (ref 135–145)
T4 FREE SERPL-MCNC: 1.39 NG/DL (ref 0.53–1.43)
TIBC SERPL-MCNC: 388 UG/DL (ref 250–450)
TSH SERPL DL<=0.005 MIU/L-ACNC: 3.1 UIU/ML (ref 0.38–5.33)
WBC # BLD AUTO: 6 K/UL (ref 4.8–10.8)

## 2018-06-15 PROCEDURE — 84443 ASSAY THYROID STIM HORMONE: CPT

## 2018-06-15 PROCEDURE — 36415 COLL VENOUS BLD VENIPUNCTURE: CPT

## 2018-06-15 PROCEDURE — 83550 IRON BINDING TEST: CPT

## 2018-06-15 PROCEDURE — 84439 ASSAY OF FREE THYROXINE: CPT

## 2018-06-15 PROCEDURE — 85025 COMPLETE CBC W/AUTO DIFF WBC: CPT

## 2018-06-15 PROCEDURE — 80053 COMPREHEN METABOLIC PANEL: CPT

## 2018-06-15 PROCEDURE — 83540 ASSAY OF IRON: CPT

## 2018-06-15 PROCEDURE — 84146 ASSAY OF PROLACTIN: CPT

## 2018-06-18 RX ORDER — DEXAMETHASONE 4 MG/1
TABLET ORAL
Qty: 6 TAB | Refills: 0 | Status: SHIPPED | OUTPATIENT
Start: 2018-06-18 | End: 2019-04-17

## 2018-06-22 ENCOUNTER — OFFICE VISIT (OUTPATIENT)
Dept: MEDICAL GROUP | Facility: MEDICAL CENTER | Age: 38
End: 2018-06-22
Payer: COMMERCIAL

## 2018-06-22 VITALS
WEIGHT: 185 LBS | OXYGEN SATURATION: 94 % | HEIGHT: 61 IN | BODY MASS INDEX: 34.93 KG/M2 | RESPIRATION RATE: 16 BRPM | TEMPERATURE: 97.6 F | DIASTOLIC BLOOD PRESSURE: 92 MMHG | SYSTOLIC BLOOD PRESSURE: 144 MMHG | HEART RATE: 110 BPM

## 2018-06-22 DIAGNOSIS — F33.1 MODERATE EPISODE OF RECURRENT MAJOR DEPRESSIVE DISORDER (HCC): ICD-10-CM

## 2018-06-22 DIAGNOSIS — Z86.39 HISTORY OF HYPOTHYROIDISM: ICD-10-CM

## 2018-06-22 DIAGNOSIS — R73.09 ELEVATED GLUCOSE: ICD-10-CM

## 2018-06-22 DIAGNOSIS — E22.1 HYPERPROLACTINEMIA (HCC): ICD-10-CM

## 2018-06-22 PROCEDURE — 99214 OFFICE O/P EST MOD 30 MIN: CPT | Performed by: NURSE PRACTITIONER

## 2018-06-22 RX ORDER — VENLAFAXINE HYDROCHLORIDE 75 MG/1
75 CAPSULE, EXTENDED RELEASE ORAL DAILY
Qty: 30 CAP | Refills: 3 | Status: SHIPPED | OUTPATIENT
Start: 2018-06-22 | End: 2018-08-30 | Stop reason: SDUPTHER

## 2018-06-22 RX ORDER — BUSPIRONE HYDROCHLORIDE 10 MG/1
10 TABLET ORAL 3 TIMES DAILY
Qty: 90 TAB | Refills: 3 | Status: SHIPPED | OUTPATIENT
Start: 2018-06-22 | End: 2019-02-08 | Stop reason: SDUPTHER

## 2018-06-22 NOTE — ASSESSMENT & PLAN NOTE
Chronic issue, poorly controlled. Continues having daily difficulty. JAYY is now  and she has to go back to work Monday. Feels this is going to increase her problems.

## 2018-06-22 NOTE — ASSESSMENT & PLAN NOTE
Increased effexor at last visit, feels it is helping but still having difficulty with both depression and anxiety. Feeling very stressed about returning to work, tearful, having panic attacks  No SI/HI, no history of manic behavior. No self injury, no substance use

## 2018-06-22 NOTE — PROGRESS NOTES
Subjective:     Chief Complaint   Patient presents with   • Follow-Up   • Labs Only     Results     Bree Gage is a 37 y.o. female here today to follow up on:    Elevated glucose  Recent labs with glucose of 110 but she was not fasting- had creamer in her sugar    Chronic migraine  Chronic issue, poorly controlled. Continues having daily difficulty. FMLA is now  and she has to go back to work Monday. Feels this is going to increase her problems.     Moderate episode of recurrent major depressive disorder (HCC)  Increased effexor at last visit, feels it is helping but still having difficulty with both depression and anxiety. Feeling very stressed about returning to work, tearful, having panic attacks  No SI/HI, no history of manic behavior. No self injury, no substance use    History of hypothyroidism  Recent thyroid studies normal    Hyperprolactinemia  Recent prolactin normal       Current medicines (including changes today)  Current Outpatient Prescriptions   Medication Sig Dispense Refill   • venlafaxine XR (EFFEXOR XR) 75 MG CAPSULE SR 24 HR Take 1 Cap by mouth every day. 30 Cap 3   • busPIRone (BUSPAR) 10 MG Tab Take 1 Tab by mouth 3 times a day. 90 Tab 3   • dexamethasone (DECADRON) 4 MG Tab TAKE THREE TABLETS BY MOUTH ON DAY 1, THEN TWO TABLETS ON DAY 2, THEN ONE TABLET ON DAY 3 (FOR INTRACTABLE MIGRAINE) 6 Tab 0   • venlafaxine (EFFEXOR-XR) 150 MG extended-release capsule Take 1 Cap by mouth every day. 30 Cap 3   • zonisamide (ZONEGRAN) 25 MG capsule Take 1 Cap by mouth every day. Titrate weekly to dose of 100 mg BID with schedule provided to pt in clinic. 120 Cap 3   • indomethacin (INDOCIN) 50 MG Cap Take 50 mg by mouth 2 Times a Day.     • ergotamine-caffeine (CAFERGOT) 1-100 MG Tab Take 1 Tab by mouth every 1 hour as needed for Headache. Monitor caffeine intake with this medication.  Maximum use 6 tabs in one day. 30 Tab 11   • dihydroergotamine (MIGRANAL) 4 MG/ML nasal spray 1 spray each  nostril - 15 minutes apart.  Can repeat 2 hours later if headache persists. Max 8 sprays weekly. 8 Ampule 6   • ondansetron (ZOFRAN) 4 MG Tab tablet TAKE ONE TABLET BY MOUTH EVERY 8 HOURS AS NEEDED FOR NAUSEA AND VOMITING 12 Tab 11   • promethazine (PHENERGAN) 25 MG Tab Take 1 Tab by mouth every 6 hours as needed for Nausea/Vomiting. 30 Tab 6   • fluticasone (FLONASE) 50 MCG/ACT nasal spray Spray 1 Spray in nose.     • hydrOXYzine (ATARAX) 25 MG Tab Take 25 mg by mouth.     • Lactobacillus Acidophilus Powder Take  by mouth.     • Magnesium Oxide 500 MG Tab Take  by mouth.     • methocarbamol (ROBAXIN) 500 MG Tab Take 500 mg by mouth.     • montelukast (SINGULAIR) 10 MG Tab Take 4 mg by mouth.     • Cetirizine HCl 10 MG Cap Take 10 mg by mouth.     • azelastine (ASTELIN) 137 MCG/SPRAY nasal spray      • QVAR 80 MCG/ACT inhaler      • ACETAMINOPHEN-CAFF-BUTALBITAL -40 MG Cap      • methocarbamol (ROBAXIN) 500 MG Tab      • modafinil (PROVIGIL) 200 MG Tab      • montelukast (SINGULAIR) 10 MG Tab      • MAGNESIUM PO Take  by mouth.     • ondansetron (ZOFRAN ODT) 4 MG TBDP Take 1 Tab by mouth every 8 hours as needed for Nausea/Vomiting. 12 Tab 0   • naproxen (NAPROSYN) 500 MG TABS Take 500 mg by mouth as needed.     • acetaminophen (TYLENOL) 500 MG TABS Take 500-1,000 mg by mouth every 6 hours as needed. Up to 8 a day     • cetirizine (ZYRTEC) 10 MG TABS Take 10 mg by mouth every day.         No current facility-administered medications for this visit.      She  has a past medical history of Alcohol addiction ([facility]); Allergy; Angina (last 2 years); Arthritis (current); Bronchitis; Depression; Drug addiction ([facility]); EBV positive mononucleosis syndrome; Fracture of finger; Hemorrhoids; Hyperlipidemia; Hypothyroidism; IBS (irritable bowel syndrome); Indigestion; Iron deficiency anemia; LBP (low back pain); Migraine; Rectal fissure; and Varicose veins. She also has no past medical history of Arrhythmia; Cancer ([facility]);  "CATARACT; Congestive heart failure (HCC); COPD; Diabetes; Dialysis; Glaucoma; Heart murmur; Heart valve disease; Hypertension; Jaundice; Myocardial infarct (HCC); Other specified symptom associated with female genital organs; Pacemaker; Personal history of venous thrombosis and embolism; Pneumonia; Renal disorder; Rheumatic fever; Seizure (HCC); Stroke (HCC); Unspecified hemorrhagic conditions; or Unspecified urinary incontinence.    ROS included above     Objective:     Blood pressure 144/92, pulse (!) 110, temperature 36.4 °C (97.6 °F), resp. rate 16, height 1.549 m (5' 1\"), weight 83.9 kg (185 lb), SpO2 94 %. Body mass index is 34.96 kg/m².     Physical Exam:  General: Alert, oriented in no acute distress.  Eye contact is good, speech is normal, affect tearful, tremorous  Lungs: clear to auscultation bilaterally, normal effort, no wheeze/ rhonchi/ rales.  CV: regular rate and rhythm, S1, S2, no murmur  Ext: no edema, color normal, vascularity normal, temperature normal    Assessment and Plan:   The following treatment plan was discussed   1. Elevated glucose  Patient was nonfasting and recent labs.    2. Chronic migraine  Poorly controlled. FMLA leave has , she will now need to return to work. She feels that her job as a major stressor, she is looking for new position. Continue follow-up with neurology    3. Moderate episode of recurrent major depressive disorder (HCC)  Uncontrolled. Continue Effexor 150 mg daily, add additional 75 mg daily. BuSpar for anxiety  venlafaxine XR (EFFEXOR XR) 75 MG CAPSULE SR 24 HR    busPIRone (BUSPAR) 10 MG Tab   4. History of hypothyroidism  Recent labs normal    5. Hyperprolactinemia (HCC)  Recent labs normal        Followup: One month regarding medication change         Please note that this dictation was created using voice recognition software. I have worked with consultants from the vendor as well as technical experts from [employer] to optimize the interface. I " have made every reasonable attempt to correct obvious errors, but I expect that there are errors of grammar and possibly content that I did not discover before finalizing the note.

## 2018-06-22 NOTE — LETTER
June 22, 2018        RE: Bree Gage    To Whom It May Concern;    Bree Gage is seen in my office for primary care. She is cleared to returned to work with no restrictions as of 6/25/18. She does, however, continue suffering from chronic migraines which are still uncontrolled. This may result in periodic work absence or necessitate time off to accommodate her medical appointments.    Please contact me with any questions.    Sincerely,            Jacque CHEEMA

## 2018-07-20 ENCOUNTER — OFFICE VISIT (OUTPATIENT)
Dept: NEUROLOGY | Facility: MEDICAL CENTER | Age: 38
End: 2018-07-20
Payer: COMMERCIAL

## 2018-07-20 VITALS
SYSTOLIC BLOOD PRESSURE: 128 MMHG | BODY MASS INDEX: 35.11 KG/M2 | HEIGHT: 61 IN | TEMPERATURE: 99 F | OXYGEN SATURATION: 98 % | DIASTOLIC BLOOD PRESSURE: 70 MMHG | HEART RATE: 93 BPM | WEIGHT: 185.96 LBS

## 2018-07-20 DIAGNOSIS — G43.111 INTRACTABLE MIGRAINE WITH AURA WITH STATUS MIGRAINOSUS: ICD-10-CM

## 2018-07-20 DIAGNOSIS — R94.01 EEG ABNORMAL: ICD-10-CM

## 2018-07-20 PROCEDURE — 99213 OFFICE O/P EST LOW 20 MIN: CPT | Performed by: PHYSICIAN ASSISTANT

## 2018-07-20 NOTE — Clinical Note
Can you send her oxygen RX to the same company the other pt told us about?  I think you faxed it to that company for him.  She didn't get it from the original rx.  Thanks. peg

## 2018-07-20 NOTE — PROGRESS NOTES
Cc:  Migraine follow up    Last visit:    [facility] paperwork completed and BONB performed.     She was out all last week due to status migrainosis.  She did not go to ER because they don't do anything typically.  She went to pain management last week.  She was put on indomethacin 50 mg BID and is doing better.  She is also taking a PPI.  Discussed spenopalatine blocks, tms, device and a whole list of subjects she had put into her phone.  Vertigo - still gets as aura and very disabiling        Acute treatment:  cafergot? - tried cafergot last week with migraine but cannot take that often due to caffeine level.  Also uses migranol sometimes with some benefit.        Prevention:  Still taking B2? - stopped B2 due to spasming bladder.  She added Coq10 and gabba at night and melatonin.  Exercise? Still struggling  Diet? Did whole30 diet and identified a few more triggers.    Acupuncture? Tried and failed        Dec 11 dilated eye exam?  All OK     Ketamine clinic? - too expensive $400 per week.     They used oxygen in clinic and right away headache improved.  I will order for home use.  I will give you handout for TMS device - mychart me if you want to try.  Discussed cost which is significant.  Recommend sending you to headache specialist in [facility] if cgrp not an option when it becomes available.  Lidocaine nasal spray?  Discuss with pain management as well as fioricet which she also wishes to have in order to function when she goes into status which happens about once per month.    Interval History:    Still continues to have poorly controlled migraines/headaches.  Treats migraines 15 days per week.    Aimovig in process - not yet received.    I will have Juliet look up who can get oxygen to her.      I will order sprix if you stop taking indomethacin.    Now patient has switched jobs to different doctor's office and feels that there will be less stress.    She states she was told she had an abnormal EEg in  past.  ??  It was a long time ago.  I will repeat.      A/p:  Abnormal spells and refractory migraines with hx of abnormal EEG.  I will order video eeg.     Chronic migraine:  aimovig    Acute treatment:  As oxygen helps her we will try again to obtain    Total time with this visit:  15   Minutes face-to-face with patient. More than 50% of this visit was spent educating patient on their illness and/or coordinating care, as detailed above

## 2018-07-30 ENCOUNTER — TELEPHONE (OUTPATIENT)
Dept: NEUROLOGY | Facility: MEDICAL CENTER | Age: 38
End: 2018-07-30

## 2018-07-30 NOTE — TELEPHONE ENCOUNTER
Was the patient seen in the last year in this department? Yes    Does patient have an active prescription for medications requested? No     Received Request Via: Patient     Prescriber? Jessica Krishnamurthy    Ergotamine ohlly 2 mg  Dispense: 30  Refills: 11  Take one tab as needed for migraine, max 3 tabs per day  Sent new script to [facility] in [place]

## 2018-08-30 ENCOUNTER — PATIENT MESSAGE (OUTPATIENT)
Dept: MEDICAL GROUP | Facility: MEDICAL CENTER | Age: 38
End: 2018-08-30

## 2018-08-30 DIAGNOSIS — F33.1 MODERATE EPISODE OF RECURRENT MAJOR DEPRESSIVE DISORDER (HCC): ICD-10-CM

## 2018-08-30 NOTE — TELEPHONE ENCOUNTER
Was the patient seen in the last year in this department? Yes    Does patient have an active prescription for medications requested? No     Received Request Via: Patient     ** Change in Pharmacy and does

## 2018-08-31 RX ORDER — VENLAFAXINE HYDROCHLORIDE 75 MG/1
75 CAPSULE, EXTENDED RELEASE ORAL DAILY
Qty: 30 CAP | Refills: 0 | Status: SHIPPED | OUTPATIENT
Start: 2018-08-31 | End: 2018-09-07 | Stop reason: SDUPTHER

## 2018-09-05 ENCOUNTER — PATIENT MESSAGE (OUTPATIENT)
Dept: MEDICAL GROUP | Facility: MEDICAL CENTER | Age: 38
End: 2018-09-05

## 2018-09-05 DIAGNOSIS — F33.1 MODERATE EPISODE OF RECURRENT MAJOR DEPRESSIVE DISORDER (HCC): ICD-10-CM

## 2018-09-07 RX ORDER — VENLAFAXINE HYDROCHLORIDE 75 MG/1
75 CAPSULE, EXTENDED RELEASE ORAL DAILY
Qty: 90 CAP | Refills: 3 | Status: SHIPPED | OUTPATIENT
Start: 2018-09-07 | End: 2018-12-27 | Stop reason: SDUPTHER

## 2018-09-07 NOTE — TELEPHONE ENCOUNTER
Was the patient seen in the last year in this department? Yes    Does patient have an active prescription for medications requested? No     Received Request Via: Patient'

## 2018-09-28 ENCOUNTER — APPOINTMENT (RX ONLY)
Dept: URBAN - METROPOLITAN AREA CLINIC 4 | Facility: CLINIC | Age: 38
Setting detail: DERMATOLOGY
End: 2018-09-28

## 2018-09-28 DIAGNOSIS — D22 MELANOCYTIC NEVI: ICD-10-CM

## 2018-09-28 DIAGNOSIS — Z71.89 OTHER SPECIFIED COUNSELING: ICD-10-CM

## 2018-09-28 DIAGNOSIS — L20.89 OTHER ATOPIC DERMATITIS: ICD-10-CM

## 2018-09-28 DIAGNOSIS — L81.4 OTHER MELANIN HYPERPIGMENTATION: ICD-10-CM

## 2018-09-28 PROBLEM — D22.71 MELANOCYTIC NEVI OF RIGHT LOWER LIMB, INCLUDING HIP: Status: ACTIVE | Noted: 2018-09-28

## 2018-09-28 PROBLEM — D22.62 MELANOCYTIC NEVI OF LEFT UPPER LIMB, INCLUDING SHOULDER: Status: ACTIVE | Noted: 2018-09-28

## 2018-09-28 PROBLEM — D22.72 MELANOCYTIC NEVI OF LEFT LOWER LIMB, INCLUDING HIP: Status: ACTIVE | Noted: 2018-09-28

## 2018-09-28 PROBLEM — F41.9 ANXIETY DISORDER, UNSPECIFIED: Status: ACTIVE | Noted: 2018-09-28

## 2018-09-28 PROBLEM — D48.5 NEOPLASM OF UNCERTAIN BEHAVIOR OF SKIN: Status: ACTIVE | Noted: 2018-09-28

## 2018-09-28 PROBLEM — D22.5 MELANOCYTIC NEVI OF TRUNK: Status: ACTIVE | Noted: 2018-09-28

## 2018-09-28 PROBLEM — F32.9 MAJOR DEPRESSIVE DISORDER, SINGLE EPISODE, UNSPECIFIED: Status: ACTIVE | Noted: 2018-09-28

## 2018-09-28 PROBLEM — D22.61 MELANOCYTIC NEVI OF RIGHT UPPER LIMB, INCLUDING SHOULDER: Status: ACTIVE | Noted: 2018-09-28

## 2018-09-28 PROBLEM — L20.84 INTRINSIC (ALLERGIC) ECZEMA: Status: ACTIVE | Noted: 2018-09-28

## 2018-09-28 PROBLEM — J45.909 UNSPECIFIED ASTHMA, UNCOMPLICATED: Status: ACTIVE | Noted: 2018-09-28

## 2018-09-28 PROCEDURE — 11100: CPT

## 2018-09-28 PROCEDURE — ? BIOPSY BY SHAVE METHOD

## 2018-09-28 PROCEDURE — 99203 OFFICE O/P NEW LOW 30 MIN: CPT | Mod: 25

## 2018-09-28 PROCEDURE — ? COUNSELING

## 2018-09-28 ASSESSMENT — LOCATION SIMPLE DESCRIPTION DERM
LOCATION SIMPLE: LEFT THIGH
LOCATION SIMPLE: INFERIOR FOREHEAD
LOCATION SIMPLE: RIGHT THIGH
LOCATION SIMPLE: LEFT FOREARM
LOCATION SIMPLE: LEFT HAND
LOCATION SIMPLE: LOWER BACK
LOCATION SIMPLE: RIGHT HAND
LOCATION SIMPLE: LEFT UPPER ARM
LOCATION SIMPLE: ABDOMEN
LOCATION SIMPLE: RIGHT FOREARM
LOCATION SIMPLE: RIGHT UPPER ARM
LOCATION SIMPLE: LEFT AXILLARY VAULT

## 2018-09-28 ASSESSMENT — LOCATION DETAILED DESCRIPTION DERM
LOCATION DETAILED: EPIGASTRIC SKIN
LOCATION DETAILED: LEFT ANTERIOR DISTAL THIGH
LOCATION DETAILED: RIGHT RADIAL DORSAL HAND
LOCATION DETAILED: SUPERIOR LUMBAR SPINE
LOCATION DETAILED: LEFT ULNAR DORSAL HAND
LOCATION DETAILED: RIGHT PROXIMAL DORSAL FOREARM
LOCATION DETAILED: LEFT DISTAL DORSAL FOREARM
LOCATION DETAILED: INFERIOR MID FOREHEAD
LOCATION DETAILED: RIGHT ANTERIOR DISTAL THIGH
LOCATION DETAILED: LEFT AXILLARY VAULT
LOCATION DETAILED: RIGHT ANTERIOR PROXIMAL UPPER ARM
LOCATION DETAILED: UMBILICUS
LOCATION DETAILED: LEFT ANTERIOR PROXIMAL UPPER ARM

## 2018-09-28 ASSESSMENT — LOCATION ZONE DERM
LOCATION ZONE: TRUNK
LOCATION ZONE: ARM
LOCATION ZONE: FACE
LOCATION ZONE: AXILLAE
LOCATION ZONE: LEG
LOCATION ZONE: HAND

## 2018-09-28 NOTE — PROCEDURE: COUNSELING
Detail Level: Zone
Detail Level: Detailed
Patient Specific Counseling (Will Not Stick From Patient To Patient): Photo today. Will monitor for change, re evaluate in 6 months
Detail Level: Simple

## 2018-09-28 NOTE — PROCEDURE: BIOPSY BY SHAVE METHOD
Post-Care Instructions: I reviewed with the patient in detail post-care instructions. Patient is to keep the biopsy site dry overnight. Gentle cleansing daily.  Apply petroleum ointment daily until healed. Patient may apply hydrogen peroxide soaks to remove any crusting.
Destruction After The Procedure: No
Notification Instructions: Patient will be notified of biopsy results. However, patient instructed to call the office if not contacted within 2 weeks.
Size Of Lesion In Cm: 0
Consent: Written consent was obtained and risks were reviewed including but not limited to scarring, infection, bleeding, scabbing, incomplete removal, nerve damage and allergy to anesthesia.
Hemostasis: Drysol
Was A Bandage Applied: Yes
Lab: [id number]
Lab Facility: 
Type Of Destruction Used: Curettage
Wound Care: Petrolatum
Dressing: pressure dressing with telfa
Silver Nitrate Text: The wound bed was treated with silver nitrate after the biopsy was performed.
Curettage Text: The wound bed was treated with curettage after the biopsy was performed.
Billing Type: Third-Party Bill
Electrodesiccation Text: The wound bed was treated with electrodesiccation after the biopsy was performed.
Biopsy Method: Personna blade
Depth Of Biopsy: dermis
Cryotherapy Text: The wound bed was treated with cryotherapy after the biopsy was performed.
Detail Level: Detailed
Electrodesiccation And Curettage Text: The wound bed was treated with electrodesiccation and curettage after the biopsy was performed.
Anesthesia Volume In Cc: 2
Biopsy Type: H and E
Anesthesia Type: 1% lidocaine with 1:100,000 epinephrine and a 1:10 solution of 8.4% sodium bicarbonate

## 2018-10-04 ENCOUNTER — TELEPHONE (OUTPATIENT)
Dept: MEDICAL GROUP | Facility: MEDICAL CENTER | Age: 38
End: 2018-10-04

## 2018-10-04 ENCOUNTER — OFFICE VISIT (OUTPATIENT)
Dept: NEUROLOGY | Facility: MEDICAL CENTER | Age: 38
End: 2018-10-04
Payer: COMMERCIAL

## 2018-10-04 VITALS
HEART RATE: 88 BPM | WEIGHT: 179 LBS | HEIGHT: 61 IN | SYSTOLIC BLOOD PRESSURE: 124 MMHG | OXYGEN SATURATION: 98 % | TEMPERATURE: 97.9 F | BODY MASS INDEX: 33.79 KG/M2 | DIASTOLIC BLOOD PRESSURE: 68 MMHG

## 2018-10-04 PROCEDURE — 99213 OFFICE O/P EST LOW 20 MIN: CPT | Performed by: PHYSICIAN ASSISTANT

## 2018-10-04 NOTE — TELEPHONE ENCOUNTER
Regarding: RE: Non-Urgent Medical Question  Contact: [phone]  ----- Message from XUAN Ko sent at 10/4/2018  8:19 AM PDT -----       ----- Message sent from XUAN Ko to Bree Gage at 10/4/2018  8:19 AM -----   We should be able to see you sooner. I will ask my office to call you.  Gillian CHEEMA      ----- Message -----     From: Bree Gage     Sent: 10/4/2018  7:44 AM PDT       To: XUAN Ko  Subject: Non-Urgent Medical Question    Good morning. I went back to work June 25th, made a mistake last Monday & was terminated.     My depression baldomero rocketed of course. This time of year is always tough. I wanted to speak with you about going back on low dose Abilify. I had good results in the past especially when the season changes.    I'm also interested if there is a test or a specialist for adult ADHD. I'm still chronically exhausted with the never ending migraines and I never rest at night. I do follow up with my pulmonologist this week as well.    Your next available appointment is October 31st.

## 2018-10-04 NOTE — PROGRESS NOTES
Cc:  Migraine follow up    Last visit was in July - at that time:    Still continues to have poorly controlled migraines/headaches.  Treats migraines 15 days per week.     Aimovig in process - not yet received.     I will have Juliet look up who can get oxygen to her.       I will order sprix if you stop taking indomethacin.     Now patient has switched jobs to different doctor's office and feels that there will be less stress.     She states she was told she had an abnormal EEg in past.  ??  It was a long time ago.  I will repeat.       A/p:  Abnormal spells and refractory migraines with hx of abnormal EEG.  I will order video eeg.      Chronic migraine:  aimovig     Acute treatment:  As oxygen helps her we will try again to obtain    Interval History:    Aimovig??  First one is on its way and expected next Tuesday.    Oxygen??  Nothing happened.  Her insurance won't cover it and it was too expensive.      Sprix??  Too expensive she doesn't want it.    EEG scheduled??  Dec 14 scheduled.  Oct 31st is last day of insurance.  I spoke to Emma and appt moved to Oct 30 7:30 AM    She has now been fired from her job due to missing work too much for migraines - which is unfortunate.      Stopped zonisamide - now too expensive with no insurance.  Got new nexplanon implanted and hopefully it will reduce estrogen triggered migraines.    A/p: chronic migraine poorly controlled and now pt has lost insurance status.  Looking for work again but it is difficult.  Continue current plan with aimovig and get eeg.    Total time with this visit:  15   Minutes face-to-face with patient. More than 50% of this visit was spent educating patient on their illness and/or coordinating care, as detailed above

## 2018-10-05 ENCOUNTER — OFFICE VISIT (OUTPATIENT)
Dept: MEDICAL GROUP | Facility: MEDICAL CENTER | Age: 38
End: 2018-10-05
Payer: COMMERCIAL

## 2018-10-05 VITALS
WEIGHT: 180.2 LBS | SYSTOLIC BLOOD PRESSURE: 126 MMHG | OXYGEN SATURATION: 99 % | HEART RATE: 97 BPM | TEMPERATURE: 97.6 F | BODY MASS INDEX: 34.02 KG/M2 | DIASTOLIC BLOOD PRESSURE: 74 MMHG | HEIGHT: 61 IN

## 2018-10-05 DIAGNOSIS — F33.1 MODERATE EPISODE OF RECURRENT MAJOR DEPRESSIVE DISORDER (HCC): ICD-10-CM

## 2018-10-05 PROCEDURE — 99214 OFFICE O/P EST MOD 30 MIN: CPT | Performed by: NURSE PRACTITIONER

## 2018-10-05 RX ORDER — ARIPIPRAZOLE 5 MG/1
2.5 TABLET ORAL DAILY
Qty: 45 TAB | Refills: 3 | Status: SHIPPED | OUTPATIENT
Start: 2018-10-05 | End: 2019-08-28 | Stop reason: SDUPTHER

## 2018-10-05 RX ORDER — VENLAFAXINE HYDROCHLORIDE 150 MG/1
150 CAPSULE, EXTENDED RELEASE ORAL DAILY
Qty: 30 CAP | Refills: 3 | Status: SHIPPED | OUTPATIENT
Start: 2018-10-05 | End: 2019-04-02 | Stop reason: SDUPTHER

## 2018-10-05 NOTE — PROGRESS NOTES
"Subjective:     Chief Complaint   Patient presents with   • Medical Advice     depression     Bree Gage is a 37 y.o. female here today to follow up on:    Moderate episode of recurrent major depressive disorder (HCC)  Recently fired from job after making a mistake, felt that she was being targeted and the employer was \"looking for a reason\". Struggling more with mood.  She continues with venlafaxine 225 mg daily, feels that symptoms are not quite controlled  She had taken Abilify several years ago and found this to be helpful, she would like to go back on this medication  Denies SI/HI, manic behavior, appetite changes, self injury, substance abuse       Current medicines (including changes today)  Current Outpatient Prescriptions   Medication Sig Dispense Refill   • venlafaxine (EFFEXOR-XR) 150 MG extended-release capsule Take 1 Cap by mouth every day. 30 Cap 3   • aripiprazole (ABILIFY) 5 MG tablet Take 0.5 Tabs by mouth every day. 45 Tab 3   • venlafaxine XR (EFFEXOR XR) 75 MG CAPSULE SR 24 HR Take 1 Cap by mouth every day. 90 Cap 3   • ergotamine (ERGOMAR) 2 MG SL tablet Take 1 Tab by mouth as needed (onset of migraine). Take maximum 2 tablets daily 2 hours apart. 20 Tab 11   • busPIRone (BUSPAR) 10 MG Tab Take 1 Tab by mouth 3 times a day. 90 Tab 3   • dexamethasone (DECADRON) 4 MG Tab TAKE THREE TABLETS BY MOUTH ON DAY 1, THEN TWO TABLETS ON DAY 2, THEN ONE TABLET ON DAY 3 (FOR INTRACTABLE MIGRAINE) 6 Tab 0   • zonisamide (ZONEGRAN) 25 MG capsule Take 1 Cap by mouth every day. Titrate weekly to dose of 100 mg BID with schedule provided to pt in clinic. 120 Cap 3   • indomethacin (INDOCIN) 50 MG Cap Take 50 mg by mouth 2 Times a Day.     • ergotamine-caffeine (CAFERGOT) 1-100 MG Tab Take 1 Tab by mouth every 1 hour as needed for Headache. Monitor caffeine intake with this medication.  Maximum use 6 tabs in one day. 30 Tab 11   • dihydroergotamine (MIGRANAL) 4 MG/ML nasal spray 1 spray each nostril - 15 " minutes apart.  Can repeat 2 hours later if headache persists. Max 8 sprays weekly. 8 Ampule 6   • ondansetron (ZOFRAN) 4 MG Tab tablet TAKE ONE TABLET BY MOUTH EVERY 8 HOURS AS NEEDED FOR NAUSEA AND VOMITING 12 Tab 11   • promethazine (PHENERGAN) 25 MG Tab Take 1 Tab by mouth every 6 hours as needed for Nausea/Vomiting. 30 Tab 6   • fluticasone (FLONASE) 50 MCG/ACT nasal spray Spray 1 Spray in nose.     • hydrOXYzine (ATARAX) 25 MG Tab Take 25 mg by mouth.     • Lactobacillus Acidophilus Powder Take  by mouth.     • Magnesium Oxide 500 MG Tab Take  by mouth.     • methocarbamol (ROBAXIN) 500 MG Tab Take 500 mg by mouth.     • montelukast (SINGULAIR) 10 MG Tab Take 4 mg by mouth.     • Cetirizine HCl 10 MG Cap Take 10 mg by mouth.     • azelastine (ASTELIN) 137 MCG/SPRAY nasal spray      • QVAR 80 MCG/ACT inhaler      • ACETAMINOPHEN-CAFF-BUTALBITAL -40 MG Cap      • methocarbamol (ROBAXIN) 500 MG Tab      • modafinil (PROVIGIL) 200 MG Tab      • montelukast (SINGULAIR) 10 MG Tab      • MAGNESIUM PO Take  by mouth.     • ondansetron (ZOFRAN ODT) 4 MG TBDP Take 1 Tab by mouth every 8 hours as needed for Nausea/Vomiting. 12 Tab 0   • naproxen (NAPROSYN) 500 MG TABS Take 500 mg by mouth as needed.     • acetaminophen (TYLENOL) 500 MG TABS Take 500-1,000 mg by mouth every 6 hours as needed. Up to 8 a day     • cetirizine (ZYRTEC) 10 MG TABS Take 10 mg by mouth every day.         No current facility-administered medications for this visit.      She  has a past medical history of Alcohol addiction ([facility]); Allergy; Angina (last 2 years); Arthritis (current); Bronchitis; Depression; Drug addiction ([facility]); EBV positive mononucleosis syndrome; Fracture of finger; Hemorrhoids; Hyperlipidemia; Hypothyroidism; IBS (irritable bowel syndrome); Indigestion; Iron deficiency anemia; LBP (low back pain); Migraine; Rectal fissure; and Varicose veins. She also has no past medical history of Arrhythmia; Cancer ([facility]); CATARACT;  "Congestive heart failure (HCC); COPD; Diabetes; Dialysis; Glaucoma; Heart murmur; Heart valve disease; Hypertension; Jaundice; Myocardial infarct (HCC); Other specified symptom associated with female genital organs; Pacemaker; Personal history of venous thrombosis and embolism; Pneumonia; Renal disorder; Rheumatic fever; Seizure (HCC); Stroke (HCC); Unspecified hemorrhagic conditions; or Unspecified urinary incontinence.    ROS included above     Objective:     Blood pressure 126/74, pulse 97, temperature 36.4 °C (97.6 °F), temperature source Temporal, height 1.549 m (5' 1\"), weight 81.7 kg (180 lb 3.2 oz), last menstrual period 09/21/2018, SpO2 99 %, not currently breastfeeding. Body mass index is 34.05 kg/m².     Physical Exam:  General: Alert, oriented in no acute distress.  Eye contact is good, speech is normal, affect calm  Lungs: clear to auscultation bilaterally, normal effort, no wheeze/ rhonchi/ rales.  CV: regular rate and rhythm, S1, S2, no murmur  Abdomen: soft, nontender  Ext: no edema, color normal, vascularity normal, temperature normal    Assessment and Plan:   The following treatment plan was discussed   1. Moderate episode of recurrent major depressive disorder (HCC)   uncontrolled.  She had taken Abilify in the past and found it to be helpful, and this was discontinued several years ago due to cost.  She is now interested in restarting.  Will start 2.5 mg Abilify daily, continue venlafaxine.  Encourage counseling.  Patient to contact me with an update in about 3 weeks  venlafaxine (EFFEXOR-XR) 150 MG extended-release capsule       Followup: as needed         Please note that this dictation was created using voice recognition software. I have worked with consultants from the vendor as well as technical experts from [facility] to optimize the interface. I have made every reasonable attempt to correct obvious errors, but I expect that there are errors of grammar and possibly content that I did not " discover before finalizing the note.

## 2018-10-25 ENCOUNTER — TELEPHONE (OUTPATIENT)
Dept: NEUROLOGY | Facility: MEDICAL CENTER | Age: 38
End: 2018-10-25

## 2018-10-25 NOTE — TELEPHONE ENCOUNTER
Pt left me VM requesting to change her pharmacy and refill on promethazine  Was the patient seen in the last year in this department? Yes    Does patient have an active prescription for medications requested? No     Received Request Via: Patient     Prescriber? Jessica Krishnamurthy    Promethazine 25 mg tablet  #30  Refills- 11  Sig- Take 1 tab PO every 6 hours PRR for nausea/vomiting  [id number] - [place], [place] - [address] [phone] (Phone)  [phone] (Fax)     Called in

## 2018-10-29 ENCOUNTER — PATIENT MESSAGE (OUTPATIENT)
Dept: MEDICAL GROUP | Facility: MEDICAL CENTER | Age: 38
End: 2018-10-29

## 2018-10-30 NOTE — TELEPHONE ENCOUNTER
From: Bree Gage  To: XUAN Ko  Sent: 10/29/2018 5:01 PM PDT  Subject: Prescription Question    The Abilify is working very well. I have not yet started working again, but I would like to continue the Abilify probably throught the end of the year    Thank you again!    I'll schedule once I know new insurance when I start working again

## 2018-10-31 ENCOUNTER — APPOINTMENT (OUTPATIENT)
Dept: MEDICAL GROUP | Facility: MEDICAL CENTER | Age: 38
End: 2018-10-31
Payer: COMMERCIAL

## 2018-11-07 ENCOUNTER — NON-PROVIDER VISIT (OUTPATIENT)
Dept: OCCUPATIONAL MEDICINE | Facility: CLINIC | Age: 38
End: 2018-11-07

## 2018-11-07 DIAGNOSIS — Z02.1 PRE-EMPLOYMENT DRUG SCREENING: ICD-10-CM

## 2018-11-07 LAB
AMP AMPHETAMINE: NORMAL
COC COCAINE: NORMAL
INT CON NEG: NORMAL
INT CON POS: NORMAL
MET METHAMPHETAMINES: NORMAL
OPI OPIATES: NORMAL
PCP PHENCYCLIDINE: NORMAL
POC DRUG COMMENT 753798-OCCUPATIONAL HEALTH: NORMAL
THC: NORMAL

## 2018-11-07 PROCEDURE — 80305 DRUG TEST PRSMV DIR OPT OBS: CPT | Performed by: PREVENTIVE MEDICINE

## 2018-11-14 ENCOUNTER — PATIENT MESSAGE (OUTPATIENT)
Dept: MEDICAL GROUP | Facility: MEDICAL CENTER | Age: 38
End: 2018-11-14

## 2018-11-14 RX ORDER — AMOXICILLIN AND CLAVULANATE POTASSIUM 875; 125 MG/1; MG/1
1 TABLET, FILM COATED ORAL 2 TIMES DAILY
Qty: 20 TAB | Refills: 0 | Status: SHIPPED | OUTPATIENT
Start: 2018-11-14 | End: 2019-04-17

## 2018-11-14 NOTE — TELEPHONE ENCOUNTER
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 I honestly thought yesterday would have been the last day but I'm going to ask if you'd be willing to prescribe just a little bit of antibiotics because I am prone to pneumonia. I had a 103.5 fever on Monday and I thought I was better yesterday but woke up this morning and I'm worse.   I have been using guiafenasin & I have tessalon pearls on hand & my flovent.      Without insurance I will not be able to come to the office and if you are unable to do so I completely understand.  I believe the cheapest pharmacy would be the [facility] on [address]    JONY Gage

## 2018-11-15 ENCOUNTER — PATIENT MESSAGE (OUTPATIENT)
Dept: MEDICAL GROUP | Facility: MEDICAL CENTER | Age: 38
End: 2018-11-15

## 2018-11-15 NOTE — TELEPHONE ENCOUNTER
From: Bree Gage  To: XUAN Ko  Sent: 11/15/2018 9:48 AM PST  Subject: Prescription Question     I really really appreciate it.  As I am prone to pneumonia, I think I caught it in time. Fever is down and I'm coughing much less.    I did not realize how many people are actually sick in the area right now.  ----- Message -----  From: XUAN Ko  Sent: 11/14/2018 11:28 AM PST  To: Bree Gage  Subject: RE: Prescription Question  Generally we do not prescribe antibiotics without an office visit. I will make an exception this time and send a prescription to [facility].  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 I honestly thought yesterday would have been the last day but I'm going to ask if you'd be willing to prescribe just a little bit of antibiotics because I am prone to pneumonia. I had a 103.5 fever on Monday and I thought I was better yesterday but woke up this morning and I'm worse.   I have been using guiafenasin & I have tessalon pearls on hand & my flovent.      Without insurance I will not be able to come to the office and if you are unable to do so I completely understand.  I believe the cheapest pharmacy would be the [facility] on [address] [place] [address] Too

## 2018-12-13 ENCOUNTER — TELEPHONE (OUTPATIENT)
Dept: NEUROLOGY | Facility: MEDICAL CENTER | Age: 38
End: 2018-12-13

## 2018-12-13 NOTE — TELEPHONE ENCOUNTER
Was the patient seen in the last year in this department? Yes    Does patient have an active prescription for medications requested? No     Received Request Via: Patient (MYCHART)    Prescriber? Jessica Krishnamurthy    Dexamethasone 4mg  #6  Refills- 2  Sig- take 3 tabs PO on day 1, then 2 tabs on day 2, then 1 tab on day 3.   [facility]/pharmacy #[id number] - [place], [place] - [address] [phone] (Phone)  [phone] (Fax)     Called in

## 2018-12-27 DIAGNOSIS — F33.1 MODERATE EPISODE OF RECURRENT MAJOR DEPRESSIVE DISORDER (HCC): ICD-10-CM

## 2018-12-27 RX ORDER — VENLAFAXINE HYDROCHLORIDE 75 MG/1
75 CAPSULE, EXTENDED RELEASE ORAL DAILY
Qty: 90 CAP | Refills: 1 | Status: SHIPPED | OUTPATIENT
Start: 2018-12-27 | End: 2019-05-23 | Stop reason: SDUPTHER

## 2018-12-27 NOTE — TELEPHONE ENCOUNTER
PATIENT REQUESTING 90 DAY SUPPLY       Was the patient seen in the last year in this department? Yes    Does patient have an active prescription for medications requested? No     Received Request Via: Pharmacy

## 2019-01-08 ENCOUNTER — TELEPHONE (OUTPATIENT)
Dept: OCCUPATIONAL MEDICINE | Facility: CLINIC | Age: 39
End: 2019-01-08

## 2019-01-08 NOTE — TELEPHONE ENCOUNTER
Phone Number Called: [phone] (home)       Message: left vm to call back at [phone] regarding preemployment. Pt needs to reschedule appt.     Left Message for patient to call back: yes

## 2019-01-15 ENCOUNTER — EH NON-PROVIDER (OUTPATIENT)
Dept: OCCUPATIONAL MEDICINE | Facility: CLINIC | Age: 39
End: 2019-01-15

## 2019-01-15 DIAGNOSIS — Z02.1 PRE-EMPLOYMENT DRUG SCREENING: ICD-10-CM

## 2019-01-15 LAB
AMP AMPHETAMINE: NORMAL
BAR BARBITURATES: NORMAL
BZO BENZODIAZEPINES: NORMAL
COC COCAINE: NORMAL
INT CON NEG: NORMAL
INT CON POS: NORMAL
MDMA ECSTASY: NORMAL
MET METHAMPHETAMINES: NORMAL
MTD METHADONE: NORMAL
OPI OPIATES: NORMAL
OXY OXYCODONE: NORMAL
PCP PHENCYCLIDINE: NORMAL
POC URINE DRUG SCREEN OCDRS: NORMAL
THC: NORMAL

## 2019-01-15 PROCEDURE — 80305 DRUG TEST PRSMV DIR OPT OBS: CPT | Performed by: PREVENTIVE MEDICINE

## 2019-01-23 PROCEDURE — 8911 PR MRO FEE: Performed by: INTERNAL MEDICINE

## 2019-02-01 ENCOUNTER — HOSPITAL ENCOUNTER (OUTPATIENT)
Facility: MEDICAL CENTER | Age: 39
End: 2019-02-01
Attending: PREVENTIVE MEDICINE
Payer: COMMERCIAL

## 2019-02-01 ENCOUNTER — EH NON-PROVIDER (OUTPATIENT)
Dept: OCCUPATIONAL MEDICINE | Facility: CLINIC | Age: 39
End: 2019-02-01

## 2019-02-01 ENCOUNTER — EMPLOYEE HEALTH (OUTPATIENT)
Dept: OCCUPATIONAL MEDICINE | Facility: CLINIC | Age: 39
End: 2019-02-01

## 2019-02-01 VITALS
HEART RATE: 103 BPM | BODY MASS INDEX: 35.3 KG/M2 | HEIGHT: 61 IN | WEIGHT: 187 LBS | TEMPERATURE: 98.9 F | DIASTOLIC BLOOD PRESSURE: 84 MMHG | SYSTOLIC BLOOD PRESSURE: 132 MMHG | OXYGEN SATURATION: 97 %

## 2019-02-01 VITALS
SYSTOLIC BLOOD PRESSURE: 132 MMHG | BODY MASS INDEX: 35.3 KG/M2 | HEART RATE: 103 BPM | OXYGEN SATURATION: 95 % | HEIGHT: 61 IN | DIASTOLIC BLOOD PRESSURE: 84 MMHG | WEIGHT: 187 LBS | TEMPERATURE: 98.9 F

## 2019-02-01 DIAGNOSIS — Z02.89 ENCOUNTER FOR OCCUPATIONAL HEALTH EXAMINATION: ICD-10-CM

## 2019-02-01 PROCEDURE — 90715 TDAP VACCINE 7 YRS/> IM: CPT | Performed by: PREVENTIVE MEDICINE

## 2019-02-01 PROCEDURE — 86480 TB TEST CELL IMMUN MEASURE: CPT | Performed by: PREVENTIVE MEDICINE

## 2019-02-01 PROCEDURE — 8915 PR COMPREHENSIVE PHYSICAL: Performed by: PREVENTIVE MEDICINE

## 2019-02-01 ASSESSMENT — VISUAL ACUITY
OS_CC: 20/13
OD_CC: 20/15

## 2019-02-01 NOTE — PROGRESS NOTES
Pre-employment physical exam. See scanned documents    Patient's body mass index is 35.33 kg/m². Exercise and nutrition counseling were performed at this visit.

## 2019-02-04 LAB
GAMMA INTERFERON BACKGROUND BLD IA-ACNC: 0.02 IU/ML
M TB IFN-G BLD-IMP: NEGATIVE
M TB IFN-G CD4+ BCKGRND COR BLD-ACNC: 0 IU/ML
MITOGEN IGNF BCKGRD COR BLD-ACNC: >10 IU/ML
QFT TB2 - NIL TBQ2: 0 IU/ML

## 2019-02-06 ENCOUNTER — EH NON-PROVIDER (OUTPATIENT)
Dept: OCCUPATIONAL MEDICINE | Facility: CLINIC | Age: 39
End: 2019-02-06

## 2019-02-06 DIAGNOSIS — Z71.85 IMMUNIZATION COUNSELING: ICD-10-CM

## 2019-02-12 ENCOUNTER — PATIENT MESSAGE (OUTPATIENT)
Dept: MEDICAL GROUP | Facility: MEDICAL CENTER | Age: 39
End: 2019-02-12

## 2019-03-08 ENCOUNTER — OFFICE VISIT (OUTPATIENT)
Dept: MEDICAL GROUP | Facility: MEDICAL CENTER | Age: 39
End: 2019-03-08
Payer: COMMERCIAL

## 2019-03-08 VITALS
BODY MASS INDEX: 33.79 KG/M2 | TEMPERATURE: 98.3 F | SYSTOLIC BLOOD PRESSURE: 110 MMHG | HEART RATE: 106 BPM | RESPIRATION RATE: 16 BRPM | WEIGHT: 179 LBS | HEIGHT: 61 IN | DIASTOLIC BLOOD PRESSURE: 88 MMHG | OXYGEN SATURATION: 95 %

## 2019-03-08 DIAGNOSIS — J01.40 ACUTE NON-RECURRENT PANSINUSITIS: ICD-10-CM

## 2019-03-08 PROCEDURE — 99214 OFFICE O/P EST MOD 30 MIN: CPT | Performed by: NURSE PRACTITIONER

## 2019-03-08 RX ORDER — AMOXICILLIN AND CLAVULANATE POTASSIUM 875; 125 MG/1; MG/1
1 TABLET, FILM COATED ORAL 2 TIMES DAILY
Qty: 20 TAB | Refills: 0 | Status: SHIPPED | OUTPATIENT
Start: 2019-03-08 | End: 2019-04-17

## 2019-03-08 NOTE — LETTER
March 8, 2019        RE: Bree Laury Gage    To whom it may concern;    Bree Gage was seen today in my office for a medical appointment. Please excuse her work absence 3/8/19 and 3/9/19 due to illness.    Please contact me with any questions.    Sincerely,                Jacque CHEEMA

## 2019-03-08 NOTE — PROGRESS NOTES
"   Subjective:   CC: Bree Gage 37 y/o Female, here today with 6 days of cold like symptoms. Symptoms include HA, productive cough, sinus pressure, yellow/green nasal mucus, also coughing up green/yellow mucus, sore throat, pain and pressure in R ear. She had a fever last night of 101.0F. She also complained of diarrhea and nausea this am. She has a Hx of L lower lobe pneumonia 9 years ago where she required tracheostomy. Also has a Hx of sinus infections. She is concerned that she is developing pneumonia again as she is describing \"difficulty breathing with left lung\".  Taking mucinex and sudafed, not much improvement.  ROS negative for sob, vomiting, sputum production, change in hearing, no discharge from the ear           Objective:      Vitals:    03/08/19 0853   BP: 110/88   Pulse: (!) 106   Resp: 16   Temp: 36.8 °C (98.3 °F)   SpO2: 95%     Body mass index is 33.82 kg/m².  Vitals:    03/08/19 0853   BP: 110/88   Weight: 81.2 kg (179 lb)   Height: 1.549 m (5' 1\")          Physical Exam:   General: Alert, oriented in no acute distress.  Eye contact is good, speech is normal, affect calm  HEENT: Oral mucosa pink moist, no lesions. TMs gray with good landmarks bilaterally. R ear canal more red than left. Pain to bilateral maxillary and frontal sinuses. No lymphadenopathy.  Lungs: symmetrical chest rise. Lungs clear to auscultation bilaterally, normal effort, no wheeze/ rhonchi/ rales.   CV: Tachycardia of 103, regular rhythm, S1, S2, no murmur  Ext: no edema, color normal, vascularity normal, temperature normal     Assessment and Plan:   1. Acute non-recurrent pansinusitis  Sinus pressure throughout the face with headache and fever last night.  Start Augmentin.  Encouraged Flonase, Mucinex, increase fluids, rest, humidity, OTC analgesic if needed. F/u if not improving in 1 week  - amoxicillin-clavulanate (AUGMENTIN) 875-125 MG Tab; Take 1 Tab by mouth 2 times a day.  Dispense: 20 Tab; Refill: 0       Followup: " If symptoms are not improving within the week. Or if cough or SOB worsen.             Please note that this dictation was created using voice recognition software. I have worked with consultants from the vendor as well as technical experts from [facility] [email or web address] to optimize the interface. I have made every reasonable attempt to correct obvious errors, but I expect that there are errors of grammar and possibly content that I did not discover before finalizing the note

## 2019-03-08 NOTE — NON-PROVIDER
"  Subjective:   CC: Bree Gage 37 y/o Female, here today with 6 days of cold like symptoms. Symptoms include HA, productive cough, sinus pressure, yellow/green nasal mucus, also coughing up green/yellow mucus, sore throat, pain and pressure in R ear. She had a fever last night of 101.0F. She also complained of diarrhea and nausea this am. She has a Hx of L lower lobe pneumonia 9 years ago where she required tracheostomy. Also has a Hx of sinus infections. She is concerned that she is developing pneumonia again as she is describing \"difficulty breathing with left lung\".       [unfilled]     Current medicines (including changes today)  [unfilled]  [unfilled] [unfilled]    ROS included above     Objective:     @V@ @BMI@     Physical Exam:   General: Alert, oriented in no acute distress.  Eye contact is good, speech is normal, affect calm  HEENT: Oral mucosa pink moist, no lesions. TMs gray with good landmarks bilaterally. R ear canal more red than left. Pain to bilateral maxillary sinuses. No lymphadenopathy.  Lungs: symmetrical chest rise. Lungs clear to auscultation bilaterally, normal effort, no wheeze/ rhonchi/ rales.   CV: Tachycardia of 103, regular rhythm, S1, S2, no murmur  Ext: no edema, color normal, vascularity normal, temperature normal    Assessment and Plan:   The following treatment plan was discussed   Augmentin 875-125mg. Take 1 tab BID for 10 days for acute infection of sinuses    Continue Flonase as she has been using. Twice a day for first week and then once a day prn.   OTC Mucinex      [unfilled]    Followup: If symptoms are not improving within the week. Or if cough or SOB worsen.          Please note that this dictation was created using voice recognition software. I have worked with consultants from the vendor as well as technical experts from [employer] to optimize the interface. I have made every reasonable attempt to correct obvious errors, but I expect that there are errors of grammar and " possibly content that I did not discover before finalizing the note

## 2019-03-11 ENCOUNTER — PATIENT MESSAGE (OUTPATIENT)
Dept: MEDICAL GROUP | Facility: MEDICAL CENTER | Age: 39
End: 2019-03-11

## 2019-03-14 ENCOUNTER — PATIENT MESSAGE (OUTPATIENT)
Dept: NEUROLOGY | Facility: MEDICAL CENTER | Age: 39
End: 2019-03-14

## 2019-03-20 NOTE — TELEPHONE ENCOUNTER
From: Bree Gage  To: Jessica Krishnamurthy  Sent: 3/14/2019 11:27 AM [facility]  Subject: Non-Urgent Medical Question    I have a new yearly Partial disability form I would like to drop off to receive before or on my appointment in April. I now work at [employer] I left a message with no response, my only concern is the last time I dropped off paperwork, it was misplaced. My appointment is set for next month! See you then!

## 2019-04-09 ENCOUNTER — PATIENT MESSAGE (OUTPATIENT)
Dept: NEUROLOGY | Facility: MEDICAL CENTER | Age: 39
End: 2019-04-09

## 2019-04-09 ENCOUNTER — PATIENT MESSAGE (OUTPATIENT)
Dept: MEDICAL GROUP | Facility: MEDICAL CENTER | Age: 39
End: 2019-04-09

## 2019-04-09 DIAGNOSIS — M25.50 ARTHRALGIA, UNSPECIFIED JOINT: ICD-10-CM

## 2019-04-09 DIAGNOSIS — R73.01 ELEVATED FASTING GLUCOSE: ICD-10-CM

## 2019-04-09 DIAGNOSIS — E22.1 HYPERPROLACTINEMIA (HCC): ICD-10-CM

## 2019-04-09 DIAGNOSIS — Z13.29 THYROID DISORDER SCREEN: ICD-10-CM

## 2019-04-09 DIAGNOSIS — Z13.220 LIPID SCREENING: ICD-10-CM

## 2019-04-09 NOTE — TELEPHONE ENCOUNTER
From: Bree Gage  To: XUAN Ko  Sent: 4/9/2019 10:56 AM PDT  Subject: Test Result Question    What do you think about labs before my appointment next week? With the weight loss, I'm hoping my sugars and lipids are better. I'd like to recheck prolactin, Vitamin D and Rheumatoid factor and CRP due to the pain and migraines as well.

## 2019-04-10 NOTE — TELEPHONE ENCOUNTER
From: Bree Gage  To: Bella Sherman, Med Ass't  Sent: 4/9/2019 10:52 AM PDT  Subject: Non-Urgent Medical Question    If this is ready to , I would appreciate it.  ----- Message -----  From: Bella Sherman, Med Ass't  Sent: 3/20/2019 2:30 PM PDT  To: Bree Gage  Subject: RE: Non-Urgent Medical Question  Nate Giron,     I did try calling back the extension you left. You definitely can drop it off at the  attention to Bella. I will keep it for your upcoming appt.     Thank you!    Belal     ----- Message -----   From: Bree Gage   Sent: 3/14/2019 11:27 AM PDT   To: Jessica Krishnamurthy  Subject: Non-Urgent Medical Question    I have a new yearly Partial disability form I would like to drop off to receive before or on my appointment in April. I now work at [employer] I left a message with no response, my only concern is the last time I dropped off paperwork, it was misplaced. My appointment is set for next month! See you then!

## 2019-04-17 ENCOUNTER — OFFICE VISIT (OUTPATIENT)
Dept: MEDICAL GROUP | Facility: MEDICAL CENTER | Age: 39
End: 2019-04-17
Payer: COMMERCIAL

## 2019-04-17 VITALS
OXYGEN SATURATION: 96 % | HEIGHT: 61 IN | RESPIRATION RATE: 16 BRPM | BODY MASS INDEX: 33.42 KG/M2 | HEART RATE: 99 BPM | TEMPERATURE: 97.5 F | SYSTOLIC BLOOD PRESSURE: 104 MMHG | WEIGHT: 177 LBS | DIASTOLIC BLOOD PRESSURE: 80 MMHG

## 2019-04-17 DIAGNOSIS — F33.1 MODERATE EPISODE OF RECURRENT MAJOR DEPRESSIVE DISORDER (HCC): ICD-10-CM

## 2019-04-17 DIAGNOSIS — E66.9 OBESITY, CLASS I, BMI 30.0-34.9 (SEE ACTUAL BMI): ICD-10-CM

## 2019-04-17 DIAGNOSIS — R20.0 NUMBNESS OF RIGHT HAND: ICD-10-CM

## 2019-04-17 DIAGNOSIS — Z01.89 NEED FOR ASSESSMENT FOR SLEEP APNEA: ICD-10-CM

## 2019-04-17 DIAGNOSIS — J45.20 MILD INTERMITTENT ASTHMA IN ADULT WITHOUT COMPLICATION: ICD-10-CM

## 2019-04-17 DIAGNOSIS — R53.82 CHRONIC FATIGUE: ICD-10-CM

## 2019-04-17 PROCEDURE — 99214 OFFICE O/P EST MOD 30 MIN: CPT | Performed by: NURSE PRACTITIONER

## 2019-04-17 ASSESSMENT — PATIENT HEALTH QUESTIONNAIRE - PHQ9
6. FEELING BAD ABOUT YOURSELF - OR THAT YOU ARE A FAILURE OR HAVE LET YOURSELF OR YOUR FAMILY DOWN: 0
2. FEELING DOWN, DEPRESSED, IRRITABLE, OR HOPELESS: 1
7. TROUBLE CONCENTRATING ON THINGS, SUCH AS READING THE NEWSPAPER OR WATCHING TELEVISION: 3
1. LITTLE INTEREST OR PLEASURE IN DOING THINGS: 2
8. MOVING OR SPEAKING SO SLOWLY THAT OTHER PEOPLE COULD HAVE NOTICED. OR THE OPPOSITE, BEING SO FIGETY OR RESTLESS THAT YOU HAVE BEEN MOVING AROUND A LOT MORE THAN USUAL: 0
3. TROUBLE FALLING OR STAYING ASLEEP OR SLEEPING TOO MUCH: 3
SUM OF ALL RESPONSES TO PHQ9 QUESTIONS 1 AND 2: 3
SUM OF ALL RESPONSES TO PHQ QUESTIONS 1-9: 11
9. THOUGHTS THAT YOU WOULD BE BETTER OFF DEAD, OR OF HURTING YOURSELF: 0
5. POOR APPETITE OR OVEREATING: 0
4. FEELING TIRED OR HAVING LITTLE ENERGY: 2

## 2019-04-18 ENCOUNTER — OFFICE VISIT (OUTPATIENT)
Dept: NEUROLOGY | Facility: MEDICAL CENTER | Age: 39
End: 2019-04-18
Payer: COMMERCIAL

## 2019-04-18 VITALS
DIASTOLIC BLOOD PRESSURE: 60 MMHG | HEART RATE: 90 BPM | SYSTOLIC BLOOD PRESSURE: 100 MMHG | HEIGHT: 61 IN | BODY MASS INDEX: 33.42 KG/M2 | OXYGEN SATURATION: 98 % | TEMPERATURE: 97.9 F | WEIGHT: 177 LBS | RESPIRATION RATE: 16 BRPM

## 2019-04-18 DIAGNOSIS — G43.111 INTRACTABLE MIGRAINE WITH AURA WITH STATUS MIGRAINOSUS: ICD-10-CM

## 2019-04-18 PROCEDURE — 99213 OFFICE O/P EST LOW 20 MIN: CPT | Performed by: PHYSICIAN ASSISTANT

## 2019-04-18 NOTE — PROGRESS NOTES
Subjective:     Chief Complaint   Patient presents with   • Follow-Up     [facility] FOR DEPRESSION      Bree Gage is a 38 y.o. female here today to follow up on:    Obesity, Class I, BMI 30.0-34.9 (see actual BMI)  Working on weight loss, eating much better    Fatigue  Continues to feel very fatigued during the day, sometimes has fallen asleep unexpectedly. Reports having had sleep study in the past which was borderline for sleep apnea.  She also reports borderline narcolepsy, was on modafinil for a period of time. she is still snoring, states boyfriend has to sleep in other room    Moderate episode of recurrent major depressive disorder (HCC)  Pt reports that she has been able to reduce venlafaxine to 75 mg, continues with abilify 5 mg. But now feeling a bit more depressed again. Does not want to increase medication, she is interested in counseling.   No si/hi, insomnia, appetite changes  Patient Health Questionaire    Little interest or pleasure in doing things?: 2   Feeling down, depressed, or hopeless?: 1  Trouble falling or staying asleep, or sleeping too much? : 3  Feeling tired or having little energy? : 2  Poor appetite or overeating? : 0  Feeling bad about yourself - or that you are a failure or have let yourself or your family down? : 0  Trouble concentrating on things, such as reading the newspaper or watching television? : 3  Moving or speaking so slowly that other people could have noticed.  Or the opposite - being so fidgety or restless that you have been moving around alot more than usual. : 0  Thoughts that you would be better off dead, or of hurting yourself?: 0  Patient Health Questionnaire Score: 11    If depressive symptoms identified deferred to follow up visit unless specifically addressed in assesment and plan.    Interpretation of PHQ-9 Total Score   Score Severity   1-4 No Depression   5-9 Mild Depression   10-14 Moderate Depression   15-19 Moderately Severe Depression   20-27 Severe  Depression        Asthma in adult without complication  Continues with singulair and rare use of albuterol. F/u scheduled with pulmonology  No increase in cough/ sob. No waking at night with symptoms    Chronic migraine  Followed by neurology. She is hoping to restart aimovig now that her insurance coverage is back in place    Numbness of right hand  Wrist pain with intermittent numbness in the L hand. She is occ using a wrist brace although not consistently, unsure if it is helping. No h/o injury, wrist swelling, change in ROM, discoloration, temperature change       Current medicines (including changes today)  Current Outpatient Prescriptions   Medication Sig Dispense Refill   • venlafaxine (EFFEXOR-XR) 150 MG extended-release capsule TAKE 1 CAPSULE BY MOUTH DAILY 30 Cap 11   • Methylsulfonylmethane (MSM PO) Take  by mouth.     • venlafaxine XR (EFFEXOR XR) 75 MG CAPSULE SR 24 HR Take 1 Cap by mouth every day. 90 Cap 1   • Lactobacillus Acidophilus Powder Take  by mouth.     • montelukast (SINGULAIR) 10 MG Tab Take 4 mg by mouth.     • azelastine (ASTELIN) 137 MCG/SPRAY nasal spray      • QVAR 80 MCG/ACT inhaler      • ACETAMINOPHEN-CAFF-BUTALBITAL -40 MG Cap      • MAGNESIUM PO Take  by mouth.     • ondansetron (ZOFRAN ODT) 4 MG TBDP Take 1 Tab by mouth every 8 hours as needed for Nausea/Vomiting. 12 Tab 0   • cetirizine (ZYRTEC) 10 MG TABS Take 10 mg by mouth every day.       • busPIRone (BUSPAR) 10 MG Tab tablet Take 1 Tab by mouth 3 times a day. 90 Tab 5   • aripiprazole (ABILIFY) 5 MG tablet Take 0.5 Tabs by mouth every day. 45 Tab 3   • indomethacin (INDOCIN) 50 MG Cap Take 50 mg by mouth 2 Times a Day.     • promethazine (PHENERGAN) 25 MG Tab Take 1 Tab by mouth every 6 hours as needed for Nausea/Vomiting. 30 Tab 6   • Cetirizine HCl 10 MG Cap Take 10 mg by mouth.     • methocarbamol (ROBAXIN) 500 MG Tab      • modafinil (PROVIGIL) 200 MG Tab      • acetaminophen (TYLENOL) 500 MG TABS Take 500-1,000  "mg by mouth every 6 hours as needed. Up to 8 a day       No current facility-administered medications for this visit.      She  has a past medical history of Alcohol addiction ([facility]); Allergy; Angina (last 2 years); Arthritis (current); Bronchitis; Depression; Drug addiction (HCC); EBV positive mononucleosis syndrome; Fracture of finger; Hemorrhoids; Hyperlipidemia; Hypothyroidism; IBS (irritable bowel syndrome); Indigestion; Iron deficiency anemia; LBP (low back pain); Migraine; Rectal fissure; and Varicose veins. She also has no past medical history of Arrhythmia; Cancer (HCC); CATARACT; Congestive heart failure (HCC); COPD; Diabetes; Dialysis; Glaucoma; Heart murmur; Heart valve disease; Hypertension; Jaundice; Myocardial infarct ([facility]); Other specified symptom associated with female genital organs; Pacemaker; Personal history of venous thrombosis and embolism; Pneumonia; Renal disorder; Rheumatic fever; Seizure (HCC); Stroke ([facility]); Unspecified hemorrhagic conditions; or Unspecified urinary incontinence.    ROS included above     Objective:     /80 (BP Location: Left arm, Patient Position: Sitting, BP Cuff Size: Adult)   Pulse 99   Temp 36.4 °C (97.5 °F) (Temporal)   Resp 16   Ht 1.549 m (5' 1\")   Wt 80.3 kg (177 lb)   SpO2 96%  Body mass index is 33.44 kg/m².     Physical Exam:  General: Alert, oriented in no acute distress.  Eye contact is good, speech is normal, affect calm  Lungs: clear to auscultation bilaterally, normal effort, no wheeze/ rhonchi/ rales.  CV: regular rate and rhythm, S1, S2, no murmur  MS: no point tenderness over R wrist, no swelling. Normal ROM. Normal color and cap refill in all digits  Ext: no edema, color normal, vascularity normal, temperature normal    Assessment and Plan:   The following treatment plan was discussed   1. Obesity, Class I, BMI 30.0-34.9 (see actual BMI)  Actively working on weight loss, encouraged to continue diet and exercise efforts   2. Chronic fatigue  " "H/o \"borderline\" sleep apnea now with worsening snoring and fatigue  REFERRAL TO SLEEP STUDIES   3. Moderate episode of recurrent major depressive disorder (HCC)  Increase in symptoms recently. She does not feel medication change is needed and would like to start counseline. Phone numbers provided. She will f/u if not improving.   4. Numbness of right hand  Encouraged consistent use of wrist brace, NSAID as needed  REFERRAL TO ORTHOPEDICS   5. Mild intermittent asthma in adult without complication  stable   6. Chronic migraine  Stable, followed by neurology   7. Need for assessment for sleep apnea  REFERRAL TO SLEEP STUDIES       Followup:  6 months       Please note that this dictation was created using voice recognition software. I have worked with consultants from the vendor as well as technical experts from [employer] to optimize the interface. I have made every reasonable attempt to correct obvious errors, but I expect that there are errors of grammar and possibly content that I did not discover before finalizing the note.       "

## 2019-04-18 NOTE — ASSESSMENT & PLAN NOTE
[facility]         [address]         [place] [address]         Phone: [phone]         Yoselyn Cabral  [address] [address]    August 22, 2018      Regarding: Dental Care    To Dental Providers Caring for Ms. Cabral,    This letter is intended to document that Ms. Cabral was seen in the office on 8/20/18. Her blood pressure was well-controlled at her most recent clinic visit, but her self-reported home blood pressures remain elevated. At this point I do not feel additional medication is indicated and some degree of lability in her blood pressure control is to be expected.     At this point, in regard to her dental care, I feel that she can proceed with necessary dental procedures unless her blood pressure at the time of care is equal to or greater than 180/90.     Please feel free to call the office with any questions.     Sincerely,      Vanesa Machuca MD  [facility]  [address] [place]  [address] [phone]       Wrist pain with intermittent numbness in the L hand. She is occ using a wrist brace although not consistently, unsure if it is helping. No h/o injury, wrist swelling, change in ROM, discoloration, temperature change

## 2019-04-18 NOTE — ASSESSMENT & PLAN NOTE
Pt reports that she has been able to reduce venlafaxine to 75 mg, continues with abilify 5 mg   Patient Health Questionaire    Little interest or pleasure in doing things?: 2   Feeling down, depressed, or hopeless?: 1  Trouble falling or staying asleep, or sleeping too much? : 3  Feeling tired or having little energy? : 2  Poor appetite or overeating? : 0  Feeling bad about yourself - or that you are a failure or have let yourself or your family down? : 0  Trouble concentrating on things, such as reading the newspaper or watching television? : 3  Moving or speaking so slowly that other people could have noticed.  Or the opposite - being so fidgety or restless that you have been moving around alot more than usual. : 0  Thoughts that you would be better off dead, or of hurting yourself?: 0  Patient Health Questionnaire Score: 11    If depressive symptoms identified deferred to follow up visit unless specifically addressed in assesment and plan.    Interpretation of PHQ-9 Total Score   Score Severity   1-4 No Depression   5-9 Mild Depression   10-14 Moderate Depression   15-19 Moderately Severe Depression   20-27 Severe Depression

## 2019-04-18 NOTE — ASSESSMENT & PLAN NOTE
Continues with singulair and rare use of albuterol. F/u scheduled with pulmonology  No increase in cough/ sob. No waking at night with symptoms

## 2019-04-18 NOTE — ASSESSMENT & PLAN NOTE
Followed by neurology. She is hoping to restart aimovig now that her insurance coverage is back in place

## 2019-04-18 NOTE — PROGRESS NOTES
Cc:  - chronic migraine/intractable migraine    Established patient of mine  who suffers from intractable migraine  Here today for completion of paperwork for FMLA/disability/related to their condition.    They report to me that the aimovig when being taken was really helping with migraine frequency.  Unfortunately she still lost her job and also her insurance.  She was unable to obtain aimovig while out of work.  We completed paperwork today which pt reports was dropped at our office in January for completion.  I'm not sure where miscommunication occurred as paperwork cannot be completed without also interviewing patient and so it is never completed by our office without appt.  I suspect she did not make appt at that time due to loss of insurance but pt was somewhat frustrated today that paperwork had not been completed and sent to insurance company by us.    Pt now has reobtained insurance and has a new job.  She has made many positive changes in her life regarding lifestyle and exercise and overall is doing well however she continues to have >2 migraines weekly with severe pain, worsened with activity, light and sound sensitivity.  She wishes to go back onto the aimovig because it was helping and she really wants to keep her current job.  Last time she took aimovig although it was helping we still did not have migraine frequency < 2 days weekly so I recommend we try higher dose aimovig 140 mg.  Counseled on s/e of constipation and she will monitor.  Again counseled today that full s/e profile of cgrp medications is not known and she verbalized her understanding but wishes to proceed with this medication due to intractable migraines.    Review of the medical chart and interviewing patient, I completed paperwork and it is scanned into media.     RTC as previously scheduled.    Total time with this visit: 15    Minutes face-to-face with patient. More than 50% of this visit was spent reviewing medical chart and  speaking with the patient about their condition and how it affects their ability to do their job.

## 2019-04-18 NOTE — ASSESSMENT & PLAN NOTE
Continues to feel very fatigued during the day, sometimes has fallen asleep unexpectedly. Reports having had sleep study in the past which was borderline for sleep apnea.  She also reports borderline narcolepsy, was on modafinil for a period of time. she is still snoring, states boyfriend has to sleep in other room

## 2019-04-24 ENCOUNTER — HOSPITAL ENCOUNTER (OUTPATIENT)
Dept: LAB | Facility: MEDICAL CENTER | Age: 39
End: 2019-04-24
Payer: COMMERCIAL

## 2019-04-24 ENCOUNTER — HOSPITAL ENCOUNTER (OUTPATIENT)
Dept: LAB | Facility: MEDICAL CENTER | Age: 39
End: 2019-04-24
Attending: NURSE PRACTITIONER
Payer: COMMERCIAL

## 2019-04-24 DIAGNOSIS — Z13.29 THYROID DISORDER SCREEN: ICD-10-CM

## 2019-04-24 DIAGNOSIS — M25.50 ARTHRALGIA, UNSPECIFIED JOINT: ICD-10-CM

## 2019-04-24 DIAGNOSIS — E22.1 HYPERPROLACTINEMIA (HCC): ICD-10-CM

## 2019-04-24 DIAGNOSIS — R73.01 ELEVATED FASTING GLUCOSE: ICD-10-CM

## 2019-04-24 DIAGNOSIS — Z13.220 LIPID SCREENING: ICD-10-CM

## 2019-04-24 LAB
ALBUMIN SERPL BCP-MCNC: 4.3 G/DL (ref 3.2–4.9)
ALBUMIN/GLOB SERPL: 1.7 G/DL
ALP SERPL-CCNC: 37 U/L (ref 30–99)
ALT SERPL-CCNC: 6 U/L (ref 2–50)
ANION GAP SERPL CALC-SCNC: 8 MMOL/L (ref 0–11.9)
AST SERPL-CCNC: 17 U/L (ref 12–45)
BDY FAT % MEASURED: 36.8 %
BILIRUB SERPL-MCNC: 0.5 MG/DL (ref 0.1–1.5)
BP DIAS: 75 MMHG
BP SYS: 116 MMHG
BUN SERPL-MCNC: 13 MG/DL (ref 8–22)
CALCIUM SERPL-MCNC: 9.2 MG/DL (ref 8.5–10.5)
CHLORIDE SERPL-SCNC: 104 MMOL/L (ref 96–112)
CHOLEST SERPL-MCNC: 153 MG/DL (ref 100–199)
CHOLEST SERPL-MCNC: 155 MG/DL (ref 100–199)
CO2 SERPL-SCNC: 26 MMOL/L (ref 20–33)
CREAT SERPL-MCNC: 0.87 MG/DL (ref 0.5–1.4)
CRP SERPL HS-MCNC: 0.05 MG/DL (ref 0–0.75)
DIABETES HTDIA: NO
EST. AVERAGE GLUCOSE BLD GHB EST-MCNC: 108 MG/DL
EVENT NAME HTEVT: NORMAL
FASTING HTFAS: YES
FASTING STATUS PATIENT QL REPORTED: NORMAL
GLOBULIN SER CALC-MCNC: 2.5 G/DL (ref 1.9–3.5)
GLUCOSE SERPL-MCNC: 92 MG/DL (ref 65–99)
GLUCOSE SERPL-MCNC: 93 MG/DL (ref 65–99)
HBA1C MFR BLD: 5.4 % (ref 0–5.6)
HDLC SERPL-MCNC: 47 MG/DL
HDLC SERPL-MCNC: 48 MG/DL
HYPERTENSION HTHYP: NO
LDLC SERPL CALC-MCNC: 90 MG/DL
LDLC SERPL CALC-MCNC: 91 MG/DL
POTASSIUM SERPL-SCNC: 4 MMOL/L (ref 3.6–5.5)
PROLACTIN SERPL-MCNC: 12.97 NG/ML (ref 2.8–26)
PROT SERPL-MCNC: 6.8 G/DL (ref 6–8.2)
RHEUMATOID FACT SER IA-ACNC: <10 IU/ML (ref 0–14)
SCREENING LOC CITY HTCIT: NORMAL
SCREENING LOC STATE HTSTA: NORMAL
SCREENING LOCATION HTLOC: NORMAL
SMOKING HTSMO: NO
SODIUM SERPL-SCNC: 138 MMOL/L (ref 135–145)
SUBSCRIBER ID HTSID: NORMAL
TRIGL SERPL-MCNC: 78 MG/DL (ref 0–149)
TRIGL SERPL-MCNC: 78 MG/DL (ref 0–149)
TSH SERPL DL<=0.005 MIU/L-ACNC: 2.27 UIU/ML (ref 0.38–5.33)

## 2019-04-24 PROCEDURE — 84146 ASSAY OF PROLACTIN: CPT

## 2019-04-24 PROCEDURE — 80061 LIPID PANEL: CPT

## 2019-04-24 PROCEDURE — 86431 RHEUMATOID FACTOR QUANT: CPT

## 2019-04-24 PROCEDURE — 80053 COMPREHEN METABOLIC PANEL: CPT

## 2019-04-24 PROCEDURE — 86140 C-REACTIVE PROTEIN: CPT

## 2019-04-24 PROCEDURE — 84443 ASSAY THYROID STIM HORMONE: CPT

## 2019-04-24 PROCEDURE — 82947 ASSAY GLUCOSE BLOOD QUANT: CPT

## 2019-04-24 PROCEDURE — S5190 WELLNESS ASSESSMENT BY NONPH: HCPCS

## 2019-04-24 PROCEDURE — 36415 COLL VENOUS BLD VENIPUNCTURE: CPT

## 2019-04-24 PROCEDURE — 83036 HEMOGLOBIN GLYCOSYLATED A1C: CPT

## 2019-04-24 PROCEDURE — 80061 LIPID PANEL: CPT | Mod: 91

## 2019-05-03 ENCOUNTER — HOSPITAL ENCOUNTER (OUTPATIENT)
Dept: LAB | Facility: MEDICAL CENTER | Age: 39
End: 2019-05-03
Attending: NURSE PRACTITIONER
Payer: COMMERCIAL

## 2019-05-03 PROCEDURE — 87070 CULTURE OTHR SPECIMN AEROBIC: CPT

## 2019-05-03 PROCEDURE — 87205 SMEAR GRAM STAIN: CPT

## 2019-05-05 LAB
GRAM STN SPEC: NORMAL
SIGNIFICANT IND 70042: NORMAL
SITE SITE: NORMAL
SOURCE SOURCE: NORMAL

## 2019-05-07 LAB
BACTERIA GENITAL AEROBE CULT: NORMAL
GRAM STN SPEC: NORMAL
SIGNIFICANT IND 70042: NORMAL
SITE SITE: NORMAL
SOURCE SOURCE: NORMAL

## 2019-05-15 RX ORDER — ONDANSETRON 4 MG/1
TABLET, FILM COATED ORAL
Qty: 12 TAB | Refills: 1 | Status: SHIPPED | OUTPATIENT
Start: 2019-05-15 | End: 2019-09-20 | Stop reason: SDUPTHER

## 2019-05-15 NOTE — TELEPHONE ENCOUNTER
Was the patient seen in the last year in this department? Yes Lase seen by Jessica 04/18/19    Does patient have an active prescription for medications requested? No     Received Request Via: Pharmacy     Next appt [id number] 07/08/19

## 2019-05-22 ENCOUNTER — TELEPHONE (OUTPATIENT)
Dept: NEUROLOGY | Facility: MEDICAL CENTER | Age: 39
End: 2019-05-22

## 2019-05-23 ENCOUNTER — TELEPHONE (OUTPATIENT)
Dept: NEUROLOGY | Facility: MEDICAL CENTER | Age: 39
End: 2019-05-23

## 2019-05-23 ENCOUNTER — PATIENT MESSAGE (OUTPATIENT)
Dept: MEDICAL GROUP | Facility: MEDICAL CENTER | Age: 39
End: 2019-05-23

## 2019-05-23 ENCOUNTER — PATIENT MESSAGE (OUTPATIENT)
Dept: NEUROLOGY | Facility: MEDICAL CENTER | Age: 39
End: 2019-05-23

## 2019-05-23 DIAGNOSIS — Z83.49 FAMILY HISTORY OF B12 DEFICIENCY: ICD-10-CM

## 2019-05-23 DIAGNOSIS — F33.1 MODERATE EPISODE OF RECURRENT MAJOR DEPRESSIVE DISORDER (HCC): ICD-10-CM

## 2019-05-23 DIAGNOSIS — Z86.39 HISTORY OF NON ANEMIC VITAMIN B12 DEFICIENCY: ICD-10-CM

## 2019-05-23 RX ORDER — VENLAFAXINE HYDROCHLORIDE 75 MG/1
75 CAPSULE, EXTENDED RELEASE ORAL DAILY
Qty: 90 CAP | Refills: 3 | Status: SHIPPED | OUTPATIENT
Start: 2019-05-23 | End: 2020-06-23 | Stop reason: SDUPTHER

## 2019-05-23 NOTE — TELEPHONE ENCOUNTER
From: Bree Gage  To: XUAN Ko  Sent: 5/23/2019 3:03 PM PDT  Subject: Non-Urgent Medical Question      I would like to go back on the 75 mg qhs. Can we do a 90 day supply please & yes I would like to check my B12. I appreciate your quick response!      JONY Gage    ----- Message -----  From: XUAN Ko  Sent: 5/23/19 2:56 PM  To: Bree Gage  Subject: RE: Non-Urgent Medical Question    Nate Giron,  I do not see a B12 being checked for you since 2010, I can place an order if you would like. Please let me know.  I am happy to send in a higher dose on Effexor. Do you want to go back to 75 mg?  Gillian CHEEMA      ----- Message -----   From: Bree Gage   Sent: 5/23/2019 2:22 PM PDT   To: XUAN Ko  Subject: Non-Urgent Medical Question    I think its time to go back to the higher dose of Effexor. I had cut back, but feeling down with the weather and my migraines. I had been doing 37.5 qhs. I take my 1/2 Abilify in the morning still.    If that could please be sent to the [facility] on [email or web address] I would greatly appreciate it!!    Also, I forgot to ask - have we ever checked my B12? I used to give myself injections and feeling really run down - I don't see record of it in my labs.    Thank you again for all of your help!!

## 2019-06-05 ENCOUNTER — OFFICE VISIT (OUTPATIENT)
Dept: URGENT CARE | Facility: CLINIC | Age: 39
End: 2019-06-05
Payer: COMMERCIAL

## 2019-06-05 VITALS
WEIGHT: 179 LBS | BODY MASS INDEX: 33.79 KG/M2 | HEART RATE: 90 BPM | SYSTOLIC BLOOD PRESSURE: 118 MMHG | HEIGHT: 61 IN | DIASTOLIC BLOOD PRESSURE: 64 MMHG | OXYGEN SATURATION: 96 % | TEMPERATURE: 98.7 F | RESPIRATION RATE: 16 BRPM

## 2019-06-05 DIAGNOSIS — G43.809 OTHER MIGRAINE WITHOUT STATUS MIGRAINOSUS, NOT INTRACTABLE: ICD-10-CM

## 2019-06-05 PROCEDURE — 99214 OFFICE O/P EST MOD 30 MIN: CPT | Performed by: NURSE PRACTITIONER

## 2019-06-05 RX ORDER — KETOROLAC TROMETHAMINE 30 MG/ML
60 INJECTION, SOLUTION INTRAMUSCULAR; INTRAVENOUS ONCE
Status: COMPLETED | OUTPATIENT
Start: 2019-06-05 | End: 2019-06-05

## 2019-06-05 RX ORDER — ONDANSETRON 2 MG/ML
4 INJECTION INTRAMUSCULAR; INTRAVENOUS ONCE
Status: COMPLETED | OUTPATIENT
Start: 2019-06-05 | End: 2019-06-05

## 2019-06-05 RX ADMIN — KETOROLAC TROMETHAMINE 60 MG: 30 INJECTION, SOLUTION INTRAMUSCULAR; INTRAVENOUS at 10:14

## 2019-06-05 RX ADMIN — ONDANSETRON 4 MG: 2 INJECTION INTRAMUSCULAR; INTRAVENOUS at 10:15

## 2019-06-05 ASSESSMENT — ENCOUNTER SYMPTOMS
VISUAL CHANGE: 0
BLURRED VISION: 0
ANOREXIA: 0
HEADACHES: 1
EYE REDNESS: 0
VOMITING: 1
SHORTNESS OF BREATH: 0
DIZZINESS: 0
FOCAL WEAKNESS: 0
NAUSEA: 1
CHILLS: 0
MIGRAINE HEADACHES: 1
ABNORMAL BEHAVIOR: 0
TINGLING: 0
MYALGIAS: 0
SENSORY CHANGE: 0
EYE WATERING: 0
SORE THROAT: 0
LOSS OF CONSCIOUSNESS: 0
FEVER: 0
SEIZURES: 0
SPEECH CHANGE: 0
PHOTOPHOBIA: 1
TREMORS: 0
ABDOMINAL PAIN: 0
SINUS PRESSURE: 0
EYE PAIN: 0

## 2019-06-05 NOTE — LETTER
June 5, 2019         Patient: Bree Gage   YOB: 1980   Date of Visit: 6/5/2019           To Whom it May Concern:    Bree Gage was seen in my clinic on 6/5/2019. She may return to work on 6/6/19.    If you have any questions or concerns, please don't hesitate to call.        Sincerely,           XUAN Singer  Electronically Signed

## 2019-06-05 NOTE — PROGRESS NOTES
Subjective:   Bree Gage is a 38 y.o. female who presents for Migraine (x3days, migraine, vomiting, dizziness, nausea)        Migraine    This is a chronic problem. Episode onset: 3 days. The problem occurs constantly. The problem has been unchanged. The pain is located in the bilateral region. The pain does not radiate. The pain quality is similar to prior headaches. The quality of the pain is described as aching. The pain is at a severity of 10/10. The pain is severe. Associated symptoms include nausea, phonophobia, photophobia and vomiting. Pertinent negatives include no abdominal pain, abnormal behavior, anorexia, blurred vision, dizziness, eye pain, eye redness, eye watering, fever, seizures, sinus pressure, sore throat, tingling or visual change. The symptoms are aggravated by bright light. She has tried NSAIDs, triptans and antidepressants (Fioricet, Zofran) for the symptoms. The treatment provided no relief. Her past medical history is significant for migraine headaches.   Patient suffers from chronic migraines in which she is followed by neurology closely.  Patient has benefited from Toradol injections in the past.  Review of Systems   Constitutional: Negative for chills and fever.   HENT: Negative for sinus pressure and sore throat.    Eyes: Positive for photophobia. Negative for blurred vision, pain and redness.   Respiratory: Negative for shortness of breath.    Cardiovascular: Negative for chest pain.   Gastrointestinal: Positive for nausea and vomiting. Negative for abdominal pain and anorexia.   Genitourinary: Negative for hematuria.   Musculoskeletal: Negative for myalgias.   Skin: Negative for rash.   Neurological: Positive for headaches. Negative for dizziness, tingling, tremors, sensory change, speech change, focal weakness, seizures and loss of consciousness.     Allergies   Allergen Reactions   • Ciprofloxacin    • Compazine Unspecified     agitation   • Diclofenac      Upset     •  "Epinephrine      palpitations   • Latex       Objective:   /64 (BP Location: Right arm, Patient Position: Sitting, BP Cuff Size: Adult)   Pulse 90   Temp 37.1 °C (98.7 °F) (Temporal)   Resp 16   Ht 1.549 m (5' 1\")   Wt 81.2 kg (179 lb)   SpO2 96%   BMI 33.82 kg/m²   Physical Exam   Constitutional: She is oriented to person, place, and time. She appears well-developed and well-nourished. No distress.   HENT:   Head: Normocephalic and atraumatic.   Right Ear: Tympanic membrane normal.   Left Ear: Tympanic membrane normal.   Nose: Nose normal. Right sinus exhibits no maxillary sinus tenderness and no frontal sinus tenderness. Left sinus exhibits no maxillary sinus tenderness and no frontal sinus tenderness.   Mouth/Throat: Uvula is midline, oropharynx is clear and moist and mucous membranes are normal. No posterior oropharyngeal edema, posterior oropharyngeal erythema or tonsillar abscesses. No tonsillar exudate.   Eyes: Pupils are equal, round, and reactive to light. Conjunctivae and EOM are normal. Right eye exhibits no discharge. Left eye exhibits no discharge.   Neck: Full passive range of motion without pain. No Brudzinski's sign and no Kernig's sign noted.   Cardiovascular: Normal rate and regular rhythm.    No murmur heard.  Pulmonary/Chest: Effort normal and breath sounds normal. No respiratory distress.   Abdominal: Soft. She exhibits no distension. There is no tenderness.   Neurological: She is alert and oriented to person, place, and time. She has normal reflexes. She is not disoriented. No cranial nerve deficit or sensory deficit. GCS eye subscore is 4. GCS verbal subscore is 5. GCS motor subscore is 6.   Skin: Skin is warm, dry and intact.   Psychiatric: She has a normal mood and affect.         Assessment/Plan:   1. Other migraine without status migrainosus, not intractable  - ketorolac (TORADOL) injection 60 mg; 2 mL by Intramuscular route Once.  - ondansetron (ZOFRAN) syringe/vial injection " 4 mg; 2 mL by Intramuscular route Once.  Patient is a 38-year-old who presented with the stated above.  Neuro exam unremarkable, no red flags appreciated.  Patient has benefited from Toradol in the past will trial Toradol injection today and antiemetic.  Advised patient follow-up with neurology for further evaluation for chronic management treatment of migraines.  Patient given precautionary s/sx that mandate immediate follow up and evaluation in the ED. Advised of risks of not doing so.    DDX, Supportive care, and indications for immediate follow-up discussed with patient.    Instructed to return to clinic or nearest emergency department if we are not available for any change in condition, further concerns, or worsening of symptoms.    The patient demonstrated a good understanding and agreed with the treatment plan.

## 2019-06-13 ENCOUNTER — OFFICE VISIT (OUTPATIENT)
Dept: MEDICAL GROUP | Facility: MEDICAL CENTER | Age: 39
End: 2019-06-13
Payer: COMMERCIAL

## 2019-06-13 VITALS
WEIGHT: 180.34 LBS | SYSTOLIC BLOOD PRESSURE: 110 MMHG | HEIGHT: 61 IN | HEART RATE: 84 BPM | OXYGEN SATURATION: 98 % | TEMPERATURE: 97.6 F | RESPIRATION RATE: 16 BRPM | BODY MASS INDEX: 34.05 KG/M2 | DIASTOLIC BLOOD PRESSURE: 82 MMHG

## 2019-06-13 DIAGNOSIS — G43.111 INTRACTABLE MIGRAINE WITH AURA WITH STATUS MIGRAINOSUS: ICD-10-CM

## 2019-06-13 DIAGNOSIS — M25.571 CHRONIC PAIN OF RIGHT ANKLE: ICD-10-CM

## 2019-06-13 DIAGNOSIS — G89.29 CHRONIC PAIN OF RIGHT ANKLE: ICD-10-CM

## 2019-06-13 DIAGNOSIS — Z87.81 HISTORY OF FRACTURE OF RIGHT ANKLE: ICD-10-CM

## 2019-06-13 DIAGNOSIS — F33.1 MODERATE EPISODE OF RECURRENT MAJOR DEPRESSIVE DISORDER (HCC): ICD-10-CM

## 2019-06-13 PROCEDURE — 99214 OFFICE O/P EST MOD 30 MIN: CPT | Performed by: NURSE PRACTITIONER

## 2019-06-13 NOTE — ASSESSMENT & PLAN NOTE
Recent severe episode leading to urgent care visit where she was given Toradol and Benadryl, this seemed to help symptoms.  She had been getting some benefit from amovig but was having significant side effects that this was stopped.  She will be seeing Dr. Doshi in August

## 2019-06-13 NOTE — ASSESSMENT & PLAN NOTE
Initial injury in 2011 now with ongoing pain and swelling in the lateral right ankle.  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  Pain is worse with long periods of standing or walking, it swells at times.  She occasionally takes Tylenol but otherwise has not tried any treatment.  She had been seen by Dr. Ohara with podiatry who recommended surgery at one point, he is no longer taking her insurance and she is needing a new referral for orthopedics  No joint redness, heat, instability, no difficulty bearing weight

## 2019-06-14 NOTE — PROGRESS NOTES
Subjective:     Chief Complaint   Patient presents with   • Follow-Up     [facility], MIGRAINE    • Ankle Pain   • Referral Needed     FOR ANKLE      Breealina Gage is a 38 y.o. female here today to follow up on:    Intractable migraine with aura with status migrainosus  Recent severe episode leading to urgent care visit where she was given Toradol and Benadryl, this seemed to help symptoms.  She had been getting some benefit from amovig but was having significant side effects that this was stopped.  She will be seeing Dr. Doshi in August    Moderate episode of recurrent major depressive disorder (HCC)  stable at this time on abilify and venlafaxine, she has reduced her venlafaxine dose as she tends to need less medication in the summertime    History of fracture of right ankle  Initial injury in 2011 now with ongoing pain and swelling in the lateral right ankle.  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  Pain is worse with long periods of standing or walking, it swells at times.  She occasionally takes Tylenol but otherwise has not tried any treatment.  She had been seen by Dr. Ohara with podiatry who recommended surgery at one point, he is no longer taking her insurance and she is needing a new referral for orthopedics  No joint redness, heat, instability, no difficulty bearing weight       Current medicines (including changes today)  Current Outpatient Prescriptions   Medication Sig Dispense Refill   • Diclofenac Sodium 1 % Gel Apply 2-4 g to skin as directed 4 times a day as needed. 1 Tube 2   • venlafaxine XR (EFFEXOR XR) 75 MG CAPSULE SR 24 HR Take 1 Cap by mouth every day. 90 Cap 3   • ondansetron (ZOFRAN) 4 MG Tab tablet TAKE 1 TAB BY MOUTH EVERY 8 HOURS AS NEEDED FOR NAUSEA/VOMITING 12 Tab 1   • Methylsulfonylmethane (MSM PO) Take  by mouth.     • aripiprazole (ABILIFY) 5 MG tablet Take 0.5 Tabs by mouth every day. 45 Tab 3   • indomethacin  (INDOCIN) 50 MG Cap Take 50 mg by mouth 2 Times a Day.     • promethazine (PHENERGAN) 25 MG Tab Take 1 Tab by mouth every 6 hours as needed for Nausea/Vomiting. 30 Tab 6   • Lactobacillus Acidophilus Powder Take  by mouth.     • montelukast (SINGULAIR) 10 MG Tab Take 4 mg by mouth.     • Cetirizine HCl 10 MG Cap Take 10 mg by mouth.     • azelastine (ASTELIN) 137 MCG/SPRAY nasal spray      • QVAR 80 MCG/ACT inhaler      • ACETAMINOPHEN-CAFF-BUTALBITAL -40 MG Cap      • methocarbamol (ROBAXIN) 500 MG Tab      • modafinil (PROVIGIL) 200 MG Tab      • MAGNESIUM PO Take  by mouth.     • ondansetron (ZOFRAN ODT) 4 MG TBDP Take 1 Tab by mouth every 8 hours as needed for Nausea/Vomiting. 12 Tab 0   • acetaminophen (TYLENOL) 500 MG TABS Take 500-1,000 mg by mouth every 6 hours as needed. Up to 8 a day     • cetirizine (ZYRTEC) 10 MG TABS Take 10 mg by mouth every day.         No current facility-administered medications for this visit.      She  has a past medical history of Alcohol addiction ([facility]); Allergy; Angina (last 2 years); Arthritis (current); Bronchitis; Depression; Drug addiction ([facility]); EBV positive mononucleosis syndrome; Fracture of finger; Hemorrhoids; Hyperlipidemia; Hypothyroidism; IBS (irritable bowel syndrome); Indigestion; Iron deficiency anemia; LBP (low back pain); Migraine; Rectal fissure; and Varicose veins. She also has no past medical history of Arrhythmia; Cancer (HCC); CATARACT; Congestive heart failure (HCC); COPD; Diabetes; Dialysis; Glaucoma; Heart murmur; Heart valve disease; Hypertension; Jaundice; Myocardial infarct (HCC); Other specified symptom associated with female genital organs; Pacemaker; Personal history of venous thrombosis and embolism; Pneumonia; Renal disorder; Rheumatic fever; Seizure (HCC); Stroke ([facility]); Unspecified hemorrhagic conditions; or Unspecified urinary incontinence.    ROS included above     Objective:     /82 (BP Location: Left arm, Patient Position:  "Sitting, BP Cuff Size: Adult)   Pulse 84   Temp 36.4 °C (97.6 °F) (Temporal)   Resp 16   Ht 1.549 m (5' 1\")   Wt 81.8 kg (180 lb 5.4 oz)   SpO2 98%  Body mass index is 34.07 kg/m².     Physical Exam:  General: Alert, oriented in no acute distress.  Eye contact is good, speech is normal, affect calm  Lungs: clear to auscultation bilaterally, normal effort, no wheeze/ rhonchi/ rales.  CV: regular rate and rhythm, S1, S2, no murmur  Ext: Soft tissue swelling of the right lateral ankle, full range of motion, no point tenderness or erythema.  vascularity normal, temperature normal    Assessment and Plan:   The following treatment plan was discussed   1. Intractable migraine with aura with status migrainosus   some benefit with amovig but was having significant side effects that this has been discontinued.  She will be seeing neurology again   2. Moderate episode of recurrent major depressive disorder (HCC)   stable   3. History of fracture of right ankle   persistent right ankle pain and swelling.  Conservative measures reviewed, may try topical diclofenac.  Referral placed  REFERRAL TO ORTHOPEDICS    Diclofenac Sodium 1 % Gel   4. Chronic pain of right ankle  REFERRAL TO ORTHOPEDICS    Diclofenac Sodium 1 % Gel       Followup: as needed         Please note that this dictation was created using voice recognition software. I have worked with consultants from the vendor as well as technical experts from [employer] [facility] [employer] to optimize the interface. I have made every reasonable attempt to correct obvious errors, but I expect that there are errors of grammar and possibly content that I did not discover before finalizing the note.       "

## 2019-06-18 ENCOUNTER — OFFICE VISIT (OUTPATIENT)
Dept: MEDICAL GROUP | Facility: MEDICAL CENTER | Age: 39
End: 2019-06-18
Payer: COMMERCIAL

## 2019-06-18 VITALS
HEIGHT: 61 IN | SYSTOLIC BLOOD PRESSURE: 104 MMHG | WEIGHT: 182 LBS | OXYGEN SATURATION: 97 % | DIASTOLIC BLOOD PRESSURE: 78 MMHG | HEART RATE: 82 BPM | TEMPERATURE: 98.2 F | BODY MASS INDEX: 34.36 KG/M2 | RESPIRATION RATE: 16 BRPM

## 2019-06-18 DIAGNOSIS — G43.909 MIGRAINE SYNDROME: ICD-10-CM

## 2019-06-18 PROCEDURE — 99214 OFFICE O/P EST MOD 30 MIN: CPT | Performed by: NURSE PRACTITIONER

## 2019-06-18 RX ORDER — DEXAMETHASONE 4 MG/1
TABLET ORAL
Qty: 6 TAB | Refills: 0 | Status: SHIPPED | OUTPATIENT
Start: 2019-06-18 | End: 2019-08-13 | Stop reason: SDUPTHER

## 2019-06-18 RX ORDER — KETOROLAC TROMETHAMINE 30 MG/ML
60 INJECTION, SOLUTION INTRAMUSCULAR; INTRAVENOUS ONCE
Status: COMPLETED | OUTPATIENT
Start: 2019-06-18 | End: 2019-06-18

## 2019-06-18 RX ADMIN — KETOROLAC TROMETHAMINE 60 MG: 30 INJECTION, SOLUTION INTRAMUSCULAR; INTRAVENOUS at 11:49

## 2019-06-18 NOTE — LETTER
June 18, 2019        RE: Bree Gage    To Whom It May Concern;    Bree Gage was seen today in my office for a medical appointment. Please excuse her works absence 6/18/19 due to illness.    Please contact me with any questions.    Sincerely,                MP Ko.

## 2019-06-18 NOTE — PROGRESS NOTES
"Subjective:     Chief Complaint   Patient presents with   • Migraine     Bree Gage is a 38 y.o. female established patient here for acute migraine which started 3 days ago.  Yesterday she was able to struggle through work but today she woke up feeling worse, pain is \"severe\", mainly in the left temporal region with slight visual distortion on the left.  This is not unusual for her.  She has long-standing history of migraine and has failed multiple treatment options including Botox, amovig, Topamax, propranolol, triptans.  She is now waiting to establish with a new neurologist.  In the past for acute episodes she has benefited from Toradol injection as well as dexamethasone.  She does tend to get nausea and vomiting which is somewhat improved with Zofran.  No recent fever, chills, congestion, cough, abdominal pain    No problem-specific Assessment & Plan notes found for this encounter.       Current medicines (including changes today)  Current Outpatient Prescriptions   Medication Sig Dispense Refill   • dexamethasone (DECADRON) 4 MG Tab TAKE THREE TABLETS BY MOUTH ON DAY 1, THEN TWO TABLETS ON DAY 2, THEN ONE TABLET ON DAY 3 (FOR INTRACTABLE MIGRAINE) 6 Tab 0   • Diclofenac Sodium 1 % Gel Apply 2-4 g to skin as directed 4 times a day as needed. 1 Tube 2   • venlafaxine XR (EFFEXOR XR) 75 MG CAPSULE SR 24 HR Take 1 Cap by mouth every day. 90 Cap 3   • ondansetron (ZOFRAN) 4 MG Tab tablet TAKE 1 TAB BY MOUTH EVERY 8 HOURS AS NEEDED FOR NAUSEA/VOMITING 12 Tab 1   • Methylsulfonylmethane (MSM PO) Take  by mouth.     • aripiprazole (ABILIFY) 5 MG tablet Take 0.5 Tabs by mouth every day. 45 Tab 3   • indomethacin (INDOCIN) 50 MG Cap Take 50 mg by mouth 2 Times a Day.     • Lactobacillus Acidophilus Powder Take  by mouth.     • montelukast (SINGULAIR) 10 MG Tab Take 4 mg by mouth.     • Cetirizine HCl 10 MG Cap Take 10 mg by mouth.     • azelastine (ASTELIN) 137 MCG/SPRAY nasal spray      • QVAR 80 MCG/ACT " "inhaler      • ACETAMINOPHEN-CAFF-BUTALBITAL -40 MG Cap      • methocarbamol (ROBAXIN) 500 MG Tab      • modafinil (PROVIGIL) 200 MG Tab      • MAGNESIUM PO Take  by mouth.     • ondansetron (ZOFRAN ODT) 4 MG TBDP Take 1 Tab by mouth every 8 hours as needed for Nausea/Vomiting. 12 Tab 0   • acetaminophen (TYLENOL) 500 MG TABS Take 500-1,000 mg by mouth every 6 hours as needed. Up to 8 a day     • cetirizine (ZYRTEC) 10 MG TABS Take 10 mg by mouth every day.       • promethazine (PHENERGAN) 25 MG Tab Take 1 Tab by mouth every 6 hours as needed for Nausea/Vomiting. 30 Tab 6     Current Facility-Administered Medications   Medication Dose Route Frequency Provider Last Rate Last Dose   • ketorolac (TORADOL) injection 60 mg  60 mg Intramuscular Once MP Ko.         She  has a past medical history of Alcohol addiction ([facility]); Allergy; Angina (last 2 years); Arthritis (current); Bronchitis; Depression; Drug addiction ([facility]); EBV positive mononucleosis syndrome; Fracture of finger; Hemorrhoids; Hyperlipidemia; Hypothyroidism; IBS (irritable bowel syndrome); Indigestion; Iron deficiency anemia; LBP (low back pain); Migraine; Rectal fissure; and Varicose veins. She also has no past medical history of Arrhythmia; Cancer (HCC); CATARACT; Congestive heart failure (HCC); COPD; Diabetes; Dialysis; Glaucoma; Heart murmur; Heart valve disease; Hypertension; Jaundice; Myocardial infarct (HCC); Other specified symptom associated with female genital organs; Pacemaker; Personal history of venous thrombosis and embolism; Pneumonia; Renal disorder; Rheumatic fever; Seizure (HCC); Stroke ([facility]); Unspecified hemorrhagic conditions; or Unspecified urinary incontinence.    ROS included above     Objective:     /78 (BP Location: Left arm, Patient Position: Sitting, BP Cuff Size: Adult)   Pulse 82   Temp 36.8 °C (98.2 °F) (Temporal)   Resp 16   Ht 1.549 m (5' 1\")   Wt 82.6 kg (182 lb)   SpO2 97%  Body mass index is " 34.39 kg/m².     Physical Exam:  General: Alert, oriented in no acute distress.  Eye contact is good, speech is normal, affect calm  HEENT: EOMI, Joaquin. No lymphadenopathy.  Lungs: clear to auscultation bilaterally, normal effort, no wheeze/ rhonchi/ rales.  CV: regular rate and rhythm, S1, S2, no murmur  Ext: no edema, color normal, vascularity normal, temperature normal    Assessment and Plan:   The following treatment plan was discussed  1. Migraine syndrome   in the office today with acute migraine.  In the past Toradol and short course of Decadron have worked well for her.  We will give 60 mg Toradol injection in the office today and I have reviewed her Decadron prescription for 12 mg today, 8 tomorrow, 4 the following day.  Letter provided to excuse work absence.  She will follow-up if not improved over the next day or 2  dexamethasone (DECADRON) 4 MG Tab    ketorolac (TORADOL) injection 60 mg       Followup: As needed         Please note that this dictation was created using voice recognition software. I have worked with consultants from the vendor as well as technical experts from [facility] [employer] to optimize the interface. I have made every reasonable attempt to correct obvious errors, but I expect that there are errors of grammar and possibly content that I did not discover before finalizing the note.

## 2019-06-20 ENCOUNTER — PATIENT MESSAGE (OUTPATIENT)
Dept: MEDICAL GROUP | Facility: MEDICAL CENTER | Age: 39
End: 2019-06-20

## 2019-06-20 NOTE — TELEPHONE ENCOUNTER
From: Bree Gage  To: XUAN Ko  Sent: 6/20/2019 10:48 AM PDT  Subject: Non-Urgent Medical Question    I am out of work again today. The toradol worked for about 20 hours. I'm back to where I was. Can I get another note for work?  Dr Alvarado wants to put in another Nexplanon and then after that possibly try Lupron, but that is a long shot. She feels it might not be hormonal because the migraines are so chronic.   I see the new Neurologist in August.     JONY Gage

## 2019-06-25 ENCOUNTER — PATIENT MESSAGE (OUTPATIENT)
Dept: MEDICAL GROUP | Facility: MEDICAL CENTER | Age: 39
End: 2019-06-25

## 2019-06-25 NOTE — TELEPHONE ENCOUNTER
From: Bree Gage  To: XUAN Ko  Sent: 6/25/2019 9:09 AM PDT  Subject: Non-Urgent Medical Question    I was out on the 19th & 20th as well.     Thank you  ----- Message -----  From: XUAN Ko  Sent: 6/25/2019 9:06 AM PDT  To: Bree Gage  Subject: RE: Non-Urgent Medical Question  I will print a letter, please just give me exact dates again.    ----- Message -----   From: Bree Gage   Sent: 6/25/2019 8:05 AM PDT   To: XUAN Ko  Subject: Non-Urgent Medical Question    My boss is asking for those notes if you could when you have a few moment. Still hurting but at work. I can't wait to get into neurology. It really is a lot worse without the CGRP medicines.   ----- Message -----  From: XUAN Ko  Sent: 6/20/2019 1:33 PM PDT  To: Bree Gage  Subject: RE: Non-Urgent Medical Question  I'm sorry to hear that. Would you like to come in to try another toradol injection?  The work note needs to specify yesterday and today, correct?    ----- Message -----   From: Bree Gage   Sent: 6/20/2019 10:48 AM PDT   To: XUAN Ko  Subject: Non-Urgent Medical Question    I am out of work again today. The toradol worked for about 20 hours. I'm back to where I was. Can I get another note for work?  Dr Alvarado wants to put in another Nexplanon and then after that possibly try Lupron, but that is a long shot. She feels it might not be hormonal because the migraines are so chronic.   I see the new Neurologist in August.     JONY Gage

## 2019-06-25 NOTE — LETTER
June 25, 2019        RE: Bree Gage    To Whom It May Concern;    Bree Gage was is seen in my office for primary care.  Please excuse her work absence June 19 and June 20, 2019 due to acute migraine.    Please contact me with any questions.    Sincerely,            MP Ko.

## 2019-06-25 NOTE — TELEPHONE ENCOUNTER
From: Bree Gage  To: XUAN Ko  Sent: 6/25/2019 8:05 AM PDT  Subject: Non-Urgent Medical Question    My boss is asking for those notes if you could when you have a few moment. Still hurting but at work. I can't wait to get into neurology. It really is a lot worse without the CGRP medicines.   ----- Message -----  From: XUAN Ko  Sent: 6/20/2019 1:33 PM PDT  To: Bree Gage  Subject: RE: Non-Urgent Medical Question  I'm sorry to hear that. Would you like to come in to try another toradol injection?  The work note needs to specify yesterday and today, correct?    ----- Message -----   From: Bree Gage   Sent: 6/20/2019 10:48 AM PDT   To: XUAN Ko  Subject: Non-Urgent Medical Question    I am out of work again today. The toradol worked for about 20 hours. I'm back to where I was. Can I get another note for work?  Dr Alvarado wants to put in another Nexplanon and then after that possibly try Lupron, but that is a long shot. She feels it might not be hormonal because the migraines are so chronic.   I see the new Neurologist in August.     JONY Gage

## 2019-06-26 RX ORDER — BUSPIRONE HYDROCHLORIDE 10 MG/1
TABLET ORAL
Qty: 270 TAB | Refills: 1 | Status: SHIPPED | OUTPATIENT
Start: 2019-06-26 | End: 2019-08-14

## 2019-06-28 ENCOUNTER — PATIENT MESSAGE (OUTPATIENT)
Dept: MEDICAL GROUP | Facility: MEDICAL CENTER | Age: 39
End: 2019-06-28

## 2019-06-28 NOTE — TELEPHONE ENCOUNTER
From: Bree Gage  To: XUAN Ko  Sent: 6/28/2019 9:18 AM PDT  Subject: Non-Urgent Medical Question    May I have the info for the counselor I was referred to please?   I have yet to hear from them and am having a few depression issues coming up.  ----- Message -----  From: XUAN Ko  Sent: 6/25/2019 9:17 AM PDT  To: Bree Gage  Subject: RE: Non-Urgent Medical Question  Ok, you should be able to print the letter from your mychart. I'm glad to hear you're somewhat better today.    ----- Message -----   From: Bree Gage   Sent: 6/25/2019 9:09 AM PDT   To: XUAN Ko  Subject: Non-Urgent Medical Question    I was out on the 19th & 20th as well.     Thank you  ----- Message -----  From: XUAN Ko  Sent: 6/25/2019 9:06 AM PDT  To: Bree Gage  Subject: RE: Non-Urgent Medical Question  I will print a letter, please just give me exact dates again.    ----- Message -----   From: Bree Gage   Sent: 6/25/2019 8:05 AM PDT   To: XUAN Ko  Subject: Non-Urgent Medical Question    My boss is asking for those notes if you could when you have a few moment. Still hurting but at work. I can't wait to get into neurology. It really is a lot worse without the CGRP medicines.   ----- Message -----  From: XUAN Ko  Sent: 6/20/2019 1:33 PM PDT  To: Bree Gage  Subject: RE: Non-Urgent Medical Question  I'm sorry to hear that. Would you like to come in to try another toradol injection?  The work note needs to specify yesterday and today, correct?    ----- Message -----   From: Bree Gage   Sent: 6/20/2019 10:48 AM PDT   To: XUAN Ko  Subject: Non-Urgent Medical Question    I am out of work again today. The toradol worked for about 20 hours. I'm back to where I was. Can I get another note for work?  Dr Alvarado wants to put in another Nexplanon and then after that possibly try Lupron, but that is a  long shot. She feels it might not be hormonal because the migraines are so chronic.   I see the new Neurologist in August.     JONY Gage

## 2019-06-28 NOTE — TELEPHONE ENCOUNTER
From: Bree Gage  To: XAUN Ko  Sent: 6/28/2019 10:35 AM PDT  Subject: Non-Urgent Medical Question    Thank you very much. I think it was [facility] :)   Have a good weekend  ----- Message -----  From: XUAN Ko  Sent: 6/28/2019 9:51 AM PDT  To: Bree Gage  Subject: RE: Non-Urgent Medical Question  [facility]  I usually refer people to the following groups, you could try to call them to get an appointment. Let me know if there is any problems.  [facility] behavioral health [phone]  [facility] [phone]    ----- Message -----   From: Bree Gage   Sent: 6/28/2019 9:18 AM PDT   To: XUAN Ko  Subject: Non-Urgent Medical Question    May I have the info for the counselor I was referred to please?   I have yet to hear from them and am having a few depression issues coming up.  ----- Message -----  From: XUAN Ko  Sent: 6/25/2019 9:17 AM PDT  To: Bree Gage  Subject: RE: Non-Urgent Medical Question  Ok, you should be able to print the letter from your mychart. I'm glad to hear you're somewhat better today.    ----- Message -----   From: Bree Gage   Sent: 6/25/2019 9:09 AM PDT   To: XUAN Ko  Subject: Non-Urgent Medical Question    I was out on the 19th & 20th as well.     Thank you  ----- Message -----  From: XUAN Ko  Sent: 6/25/2019 9:06 AM PDT  To: Bree Gage  Subject: RE: Non-Urgent Medical Question  I will print a letter, please just give me exact dates again.    ----- Message -----    From: Bree Gage   Sent: 6/25/2019 8:05 AM PDT   To: XUAN Ko  Subject: Non-Urgent Medical Question    My boss is asking for those notes if you could when you have a few moment. Still hurting but at work. I can't wait to get into neurology. It really is a lot worse without the CGRP medicines.   ----- Message -----  From: XUAN Ko  Sent: 6/20/2019 1:33 PM PDT  To:  Bree Gage  Subject: RE: Non-Urgent Medical Question  I'm sorry to hear that. Would you like to come in to try another toradol injection?  The work note needs to specify yesterday and today, correct?    ----- Message -----   From: Bree Gage   Sent: 6/20/2019 10:48 AM PDT   To: XUAN Ko  Subject: Non-Urgent Medical Question    I am out of work again today. The toradol worked for about 20 hours. I'm back to where I was. Can I get another note for work?  Dr Alvarado wants to put in another Nexplanon and then after that possibly try Lupron, but that is a long shot. She feels it might not be hormonal because the migraines are so chronic.   I see the new Neurologist in August.     JONY Gage

## 2019-07-22 ENCOUNTER — HOSPITAL ENCOUNTER (OUTPATIENT)
Dept: LAB | Facility: MEDICAL CENTER | Age: 39
End: 2019-07-22
Attending: NURSE PRACTITIONER
Payer: COMMERCIAL

## 2019-07-22 ENCOUNTER — HOSPITAL ENCOUNTER (OUTPATIENT)
Dept: RADIOLOGY | Facility: MEDICAL CENTER | Age: 39
End: 2019-07-22
Attending: OBSTETRICS & GYNECOLOGY
Payer: COMMERCIAL

## 2019-07-22 DIAGNOSIS — Z86.39 HISTORY OF NON ANEMIC VITAMIN B12 DEFICIENCY: ICD-10-CM

## 2019-07-22 DIAGNOSIS — N92.6 IRREGULAR MENSTRUAL CYCLE: ICD-10-CM

## 2019-07-22 DIAGNOSIS — R10.2 VAGINAL PAIN: ICD-10-CM

## 2019-07-22 LAB — VIT B12 SERPL-MCNC: 430 PG/ML (ref 211–911)

## 2019-07-22 PROCEDURE — 82607 VITAMIN B-12: CPT

## 2019-07-22 PROCEDURE — 77080 DXA BONE DENSITY AXIAL: CPT

## 2019-07-22 PROCEDURE — 76830 TRANSVAGINAL US NON-OB: CPT

## 2019-07-22 PROCEDURE — 36415 COLL VENOUS BLD VENIPUNCTURE: CPT

## 2019-07-24 ENCOUNTER — APPOINTMENT (RX ONLY)
Dept: URBAN - METROPOLITAN AREA CLINIC 4 | Facility: CLINIC | Age: 39
Setting detail: DERMATOLOGY
End: 2019-07-24

## 2019-07-24 DIAGNOSIS — L82.1 OTHER SEBORRHEIC KERATOSIS: ICD-10-CM

## 2019-07-24 DIAGNOSIS — D22 MELANOCYTIC NEVI: ICD-10-CM

## 2019-07-24 PROBLEM — D23.71 OTHER BENIGN NEOPLASM OF SKIN OF RIGHT LOWER LIMB, INCLUDING HIP: Status: ACTIVE | Noted: 2019-07-24

## 2019-07-24 PROBLEM — D48.5 NEOPLASM OF UNCERTAIN BEHAVIOR OF SKIN: Status: ACTIVE | Noted: 2019-07-24

## 2019-07-24 PROBLEM — D22.61 MELANOCYTIC NEVI OF RIGHT UPPER LIMB, INCLUDING SHOULDER: Status: ACTIVE | Noted: 2019-07-24

## 2019-07-24 PROCEDURE — ? OBSERVATION AND MEASURE

## 2019-07-24 PROCEDURE — ? COUNSELING

## 2019-07-24 PROCEDURE — 11102 TANGNTL BX SKIN SINGLE LES: CPT

## 2019-07-24 PROCEDURE — 99213 OFFICE O/P EST LOW 20 MIN: CPT | Mod: 25

## 2019-07-24 PROCEDURE — ? BIOPSY BY SHAVE METHOD

## 2019-07-24 PROCEDURE — ? ADDITIONAL NOTES

## 2019-07-24 ASSESSMENT — LOCATION ZONE DERM
LOCATION ZONE: NECK
LOCATION ZONE: ARM
LOCATION ZONE: TRUNK

## 2019-07-24 ASSESSMENT — LOCATION SIMPLE DESCRIPTION DERM
LOCATION SIMPLE: NECK
LOCATION SIMPLE: RIGHT POSTERIOR UPPER ARM
LOCATION SIMPLE: LEFT LOWER BACK

## 2019-07-24 ASSESSMENT — LOCATION DETAILED DESCRIPTION DERM
LOCATION DETAILED: RIGHT CENTRAL LATERAL NECK
LOCATION DETAILED: LEFT INFERIOR LATERAL MIDBACK
LOCATION DETAILED: RIGHT DISTAL LATERAL POSTERIOR UPPER ARM

## 2019-07-24 NOTE — HPI: SKIN LESION
Is This A New Presentation, Or A Follow-Up?: Skin Lesion
How Severe Is Your Skin Lesion?: mild
Has Your Skin Lesion Been Treated?: not been treated
Additional History: Patient states she may have shaved spot off.

## 2019-07-24 NOTE — PROCEDURE: BIOPSY BY SHAVE METHOD
Cryotherapy Text: The wound bed was treated with cryotherapy after the biopsy was performed.
Depth Of Biopsy: dermis
Post-Care Instructions: I reviewed with the patient in detail post-care instructions. Patient is to keep the biopsy site dry overnight, and then apply vaseline twice daily until healed.
Anesthesia Type: 1% lidocaine with epinephrine
Destruction After The Procedure: No
Lab Facility: 
X Size Of Lesion In Cm: 0
Wound Care: Vaseline
Biopsy Type: H and E
Electrodesiccation Text: The wound bed was treated with electrodesiccation after the biopsy was performed.
Notification Instructions: Patient will be notified of biopsy results. However, patient instructed to call the office if not contacted within 2 weeks.
Consent: Written consent was obtained and risks were reviewed including but not limited to scarring, infection, bleeding, scabbing, incomplete removal, nerve damage and allergy to anesthesia.
Type Of Destruction Used: Curettage
Electrodesiccation And Curettage Text: The wound bed was treated with electrodesiccation and curettage after the biopsy was performed.
Was A Bandage Applied: Yes
Anesthesia Volume In Cc: 0.5
Silver Nitrate Text: The wound bed was treated with silver nitrate after the biopsy was performed.
Dressing: bandage
Detail Level: Detailed
Biopsy Method: Personna blade
Curettage Text: The wound bed was treated with curettage after the biopsy was performed.
Hemostasis: Drysol and Electrocautery
Billing Type: Third-Party Bill
Lab: [id number]

## 2019-08-13 ENCOUNTER — PATIENT MESSAGE (OUTPATIENT)
Dept: MEDICAL GROUP | Facility: MEDICAL CENTER | Age: 39
End: 2019-08-13

## 2019-08-13 ENCOUNTER — NON-PROVIDER VISIT (OUTPATIENT)
Dept: MEDICAL GROUP | Facility: MEDICAL CENTER | Age: 39
End: 2019-08-13
Payer: COMMERCIAL

## 2019-08-13 DIAGNOSIS — K59.00 CONSTIPATION, UNSPECIFIED CONSTIPATION TYPE: ICD-10-CM

## 2019-08-13 DIAGNOSIS — G43.909 MIGRAINE SYNDROME: ICD-10-CM

## 2019-08-13 PROCEDURE — 96372 THER/PROPH/DIAG INJ SC/IM: CPT | Performed by: NURSE PRACTITIONER

## 2019-08-13 RX ORDER — DEXAMETHASONE 4 MG/1
TABLET ORAL
Qty: 6 TAB | Refills: 0 | Status: SHIPPED | OUTPATIENT
Start: 2019-08-13 | End: 2019-09-20

## 2019-08-13 RX ORDER — KETOROLAC TROMETHAMINE 30 MG/ML
60 INJECTION, SOLUTION INTRAMUSCULAR; INTRAVENOUS ONCE
Status: COMPLETED | OUTPATIENT
Start: 2019-08-13 | End: 2019-08-13

## 2019-08-13 RX ADMIN — KETOROLAC TROMETHAMINE 60 MG: 30 INJECTION, SOLUTION INTRAMUSCULAR; INTRAVENOUS at 15:37

## 2019-08-13 NOTE — NON-PROVIDER
Bree Gage is a 38 y.o. female here for a non-provider visit for TORADOL injection.    Reason for injection: MIGRAINE  Order in MAR?: Yes  Patient supplied?:No  Minimum interval has been met for this injection (per MAR order): Yes    Order and dose verified by: STEVEN JETER  Patient tolerated injection and no adverse effects were observed or reported: Yes    # of Administrations remaining in MAR: 0

## 2019-08-14 ENCOUNTER — OFFICE VISIT (OUTPATIENT)
Dept: NEUROLOGY | Facility: MEDICAL CENTER | Age: 39
End: 2019-08-14
Payer: COMMERCIAL

## 2019-08-14 VITALS
SYSTOLIC BLOOD PRESSURE: 110 MMHG | OXYGEN SATURATION: 97 % | RESPIRATION RATE: 18 BRPM | DIASTOLIC BLOOD PRESSURE: 64 MMHG | HEART RATE: 100 BPM | TEMPERATURE: 98.1 F | BODY MASS INDEX: 34.43 KG/M2 | WEIGHT: 182.2 LBS

## 2019-08-14 DIAGNOSIS — G43.119 BASILAR ARTERY MIGRAINE, INTRACTABLE: ICD-10-CM

## 2019-08-14 DIAGNOSIS — G43.111 INTRACTABLE MIGRAINE WITH AURA WITH STATUS MIGRAINOSUS: Primary | ICD-10-CM

## 2019-08-14 PROCEDURE — 99215 OFFICE O/P EST HI 40 MIN: CPT | Mod: 25 | Performed by: PSYCHIATRY & NEUROLOGY

## 2019-08-14 PROCEDURE — 96372 THER/PROPH/DIAG INJ SC/IM: CPT | Performed by: PSYCHIATRY & NEUROLOGY

## 2019-08-14 RX ORDER — METOCLOPRAMIDE 10 MG/1
TABLET ORAL
Qty: 45 TAB | Refills: 1 | Status: SHIPPED | OUTPATIENT
Start: 2019-08-14 | End: 2019-09-20

## 2019-08-14 RX ORDER — KETOROLAC TROMETHAMINE 30 MG/ML
60 INJECTION, SOLUTION INTRAMUSCULAR; INTRAVENOUS ONCE
Status: COMPLETED | OUTPATIENT
Start: 2019-08-14 | End: 2019-08-14

## 2019-08-14 RX ORDER — ONDANSETRON 2 MG/ML
4 INJECTION INTRAMUSCULAR; INTRAVENOUS ONCE
Status: COMPLETED | OUTPATIENT
Start: 2019-08-14 | End: 2019-08-14

## 2019-08-14 RX ORDER — KETOROLAC TROMETHAMINE 15.75 MG/1
1 SPRAY, METERED NASAL EVERY 6 HOURS PRN
Qty: 5 EACH | Refills: 11 | Status: SHIPPED | OUTPATIENT
Start: 2019-08-14 | End: 2019-11-22 | Stop reason: SDUPTHER

## 2019-08-14 RX ADMIN — KETOROLAC TROMETHAMINE 60 MG: 30 INJECTION, SOLUTION INTRAMUSCULAR; INTRAVENOUS at 08:41

## 2019-08-14 RX ADMIN — ONDANSETRON 4 MG: 2 INJECTION INTRAMUSCULAR; INTRAVENOUS at 08:42

## 2019-08-14 SDOH — HEALTH STABILITY: MENTAL HEALTH: HOW OFTEN DO YOU HAVE A DRINK CONTAINING ALCOHOL?: 2-3 TIMES A WEEK

## 2019-08-14 SDOH — HEALTH STABILITY: MENTAL HEALTH: HOW MANY STANDARD DRINKS CONTAINING ALCOHOL DO YOU HAVE ON A TYPICAL DAY?: 1 OR 2

## 2019-08-14 SDOH — HEALTH STABILITY: MENTAL HEALTH: HOW OFTEN DO YOU HAVE 6 OR MORE DRINKS ON ONE OCCASION?: NEVER

## 2019-08-14 ASSESSMENT — ENCOUNTER SYMPTOMS
NAUSEA: 1
PHOTOPHOBIA: 1
HEADACHES: 1
MEMORY LOSS: 1
DIZZINESS: 1

## 2019-08-14 ASSESSMENT — PAIN SCALES - GENERAL: PAINLEVEL: 7=MODERATE-SEVERE PAIN

## 2019-08-15 NOTE — PROGRESS NOTES
Subjective:      Bree Gage is a 38 y.o. female who presents for follow-up, former patient of PAZ Valdez, with a history of intractable basilar artery migraine.    HPI    History is gotten from review of the electronic health record as well as discussions with the patient.  Summary basically has headaches that started at around menarche, approximately 15 years of age, these are preceded by a prodrome and aura lasting upwards of 2 days, including left-sided weakness, right hand paresthesias, dizziness, vertigo, visual distortions and ataxia.    The headache that follows is typically left temporal, nausea and vomiting are profound, heightened sensitivities and significant autonomic symptomatology ipsilaterally.  There is some scalp hypersensitivity as well.    Headaches can last upwards of 7 days on a routine basis.  She is having headaches on a near daily basis though they are always associated with her menses, lasting upwards of 2 weeks.  They can increase randomly over the course of day.    Other than menses, other triggers include emotional duress, barometric pressure changes, sleep deprivation, strong odors, certain foods and alcohol.    She has an early childhood history of cyclic vomiting beginning at 6 years of age, developing into the headaches at about 15.    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  Follow-up studies last done in 2017 revealed no consistent changes or active enhancement.  The brain substance itself has been normal.  MRI of the cervical spine done in October, 2017 was completely normal.  Because of the persistent symptoms, video EEG was scheduled, she has not followed through with this.  Routine EEG studies in the past were normal.  Blood work including autoimmune screens, thyroid and B12 levels have all proven unremarkable, prolactin level was last  12.97 in April of this year.    She has failed multiple medications including Topamax, Elavil and Inderal all because of side effects, more recently Neurontin was failed because of inefficacy as well as Zonegran.  Botox in the past was attempted through this clinic, again with limited efficacy.  She has pretty much failed all of the drugs in the class of triptans for rescue.    Medical, surgical and family histories are reviewed in the electronic health record, there are no new drug allergies.  She has a history of IBS, anemia, dyslipidemia, mood disorder (either MAD or bipolar) hypothyroidism, but no history of CAD, CVA, PVD, malignancy, MS, neuro degenerative disease or diagnosed blood dyscrasia.  There is no surgical history of note from my standpoint.    Her mother and a paternal aunt both suffered from migraine, she is an only child and has no children.  There is no family history of CVA or CAD.  Her menses are regular though they occur every 2 weeks.  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  She has had problems maintaining work because of the intractable nature of her headaches, has applied for FMLA, may be needing a letter from this office specifying time off required for the headaches.    She presently uses Decadron which can help acute flareups, also Effexor X are 75 mg daily, Zofran 4 mg as needed, Phenergan 25 mg nightly, Singulair, Provigil, magnesium, Indocin 50 mg twice daily, Fioricet, Abilify, the rest as per the electronic health record, noncontributory from my standpoint.    Review of Systems   Constitutional: Positive for malaise/fatigue.   Eyes: Positive for photophobia.   Gastrointestinal: Positive for nausea.   Neurological: Positive for dizziness and headaches.   Psychiatric/Behavioral: Positive for memory loss.   All other systems reviewed and are negative.       Objective:     /64 (BP Location: Left arm,  Patient Position: Sitting)   Pulse 100   Temp 36.7 °C (98.1 °F)   Resp 18   Wt 82.6 kg (182 lb 3.2 oz)   SpO2 97%   BMI 34.43 kg/m²      Physical Exam    She is in significant distress, complaining of moderate headache pain with nausea and photophobia.  Still she is quite cooperative.  She moves easily and laughs easily.  Vital signs are stable.  There is no malar rash, jaw claudication, there is temporal tenderness, the skin is hypersensitive across the scalp and face.  Neck is supple, range of motion is full.  Occipital tenderness exists, there is no meningismus.  Carotid pulses are present bilaterally without asymmetry.  Cardiac evaluation reveals a regular rhythm.    Mental status exam is intact, fully oriented, there is no aphasia or inattention.    PERRLA/EOMI, visual fields are full to finger counting bilaterally, there is no facial asymmetry, sensory exam is intact to temperature and light touch across the midline, the tongue and uvula are midline without bulbar dysfunction, shoulder shrug and head rotation are intact and symmetric.    Musculoskeletal exam reveals normal tone, there is no tremor, asterixis, or drift.  Strength is intact in all 4 extremities, reflexes are brisk and present at all points without asymmetries, none are dropped, both toes are downgoing.    She stands easily, arm swing is symmetric, gait is normal.  Heel, toe, and tandem walking can all be done easily.  Repetitive movements and fine motor control are also intact and symmetric in all 4 extremities with normal amplitude and frequencies.  There is no appendicular dystaxia throughout.    Sensory exam is intact to temperature and vibration.  Romberg is absent.     Assessment/Plan:     1. Basilar artery migraine, intractable  This woman symptoms are consistent with basilar artery migraine, the headaches fit criteria for chronic migraine given their frequency and severity/duration.  Thus, though the prodrome and aura are sustained,  not unheard up with refractory migraine, I do not think an EEG study is indicated.  She has already failed Botox, a CGRP medication is indicated.  We talked at length about the class of drugs themselves, efficacies, side effects, response rates, etc.  They are safe to take with any other therapeutic regimen used concurrently.    For rescue, I would like to try Sprix nasal spray, nasal form of Toradol, shown to be quite effective.  Reglan 10 mg will be added for the nausea but also hopefully to improve efficacy.  She was told to stop the Phenergan use at nighttime, using only Zofran for nausea on other occasions.  We are left with limited choices for rescue medicine, thus another reason for more aggressive preventative treatments.  For the headache she has now, Toradol and Zofran will be used.    I am concerned about the degree of disability that she has, though I suspect there is a subjective component to symptom severity, she does appear to suffer from a disabling process.  A letter can be dictated in the interim until she is a candidate for FMLA, indicating a frequency and duration where total, temporary disability may be required because of the headaches.  She and I can follow-up otherwise in about 6 months, phone contact to adjust medications.    - ketorolac (TORADOL) injection 60 mg  - ondansetron (ZOFRAN) syringe/vial injection 4 mg  - Ketorolac Tromethamine (SPRIX) 15.75 MG/SPRAY Solution; Spray 1 Spray in nose every 6 hours as needed.  Dispense: 5 Each; Refill: 11  - metoclopramide (REGLAN) 10 MG Tab; 1-3 tab at headache/nausea onset; repeat in 4-6 hours prn  Dispense: 45 Tab; Refill: 1  - Galcanezumab-gnlm (EMGALITY) 120 MG/ML Solution Auto-injector; Inject 1 PEN as instructed Q 4 Weeks.  Dispense: 1 PEN; Refill: 5    Time: 40 minutes spent face-to-face for exam, review, discussion, and education, of this over 60% of the time spent counseling and coordinating care.

## 2019-08-27 ASSESSMENT — ENCOUNTER SYMPTOMS: SLEEP DISTURBANCE: 0

## 2019-08-30 ENCOUNTER — APPOINTMENT (OUTPATIENT)
Dept: SLEEP MEDICINE | Facility: MEDICAL CENTER | Age: 39
End: 2019-08-30
Payer: COMMERCIAL

## 2019-08-30 ENCOUNTER — SLEEP CENTER VISIT (OUTPATIENT)
Dept: SLEEP MEDICINE | Facility: MEDICAL CENTER | Age: 39
End: 2019-08-30
Payer: COMMERCIAL

## 2019-08-30 VITALS
OXYGEN SATURATION: 98 % | WEIGHT: 182 LBS | HEIGHT: 62 IN | DIASTOLIC BLOOD PRESSURE: 60 MMHG | RESPIRATION RATE: 16 BRPM | HEART RATE: 85 BPM | BODY MASS INDEX: 33.49 KG/M2 | SYSTOLIC BLOOD PRESSURE: 108 MMHG

## 2019-08-30 DIAGNOSIS — E66.9 OBESITY (BMI 30-39.9): ICD-10-CM

## 2019-08-30 DIAGNOSIS — R06.83 SNORING: ICD-10-CM

## 2019-08-30 DIAGNOSIS — J45.20 MILD INTERMITTENT ASTHMA WITHOUT COMPLICATION: ICD-10-CM

## 2019-08-30 DIAGNOSIS — G25.81 RESTLESS LEG SYNDROME: ICD-10-CM

## 2019-08-30 DIAGNOSIS — Z87.891 FORMER SMOKER: ICD-10-CM

## 2019-08-30 DIAGNOSIS — G47.30 SLEEP APNEA, UNSPECIFIED TYPE: ICD-10-CM

## 2019-08-30 DIAGNOSIS — G47.10 HYPERSOMNOLENCE: ICD-10-CM

## 2019-08-30 PROCEDURE — 99204 OFFICE O/P NEW MOD 45 MIN: CPT | Performed by: INTERNAL MEDICINE

## 2019-08-30 RX ORDER — OMEPRAZOLE 20 MG/1
20 CAPSULE, DELAYED RELEASE ORAL
COMMUNITY

## 2019-09-05 DIAGNOSIS — G43.909 MIGRAINE SYNDROME: ICD-10-CM

## 2019-09-05 RX ORDER — DEXAMETHASONE 4 MG/1
TABLET ORAL
Qty: 6 TAB | OUTPATIENT
Start: 2019-09-05

## 2019-09-13 ENCOUNTER — TELEPHONE (OUTPATIENT)
Dept: SLEEP MEDICINE | Facility: MEDICAL CENTER | Age: 39
End: 2019-09-13

## 2019-09-13 NOTE — TELEPHONE ENCOUNTER
----- Message from Bree Gage sent at 9/12/2019 12:05 PM PDT -----  Regarding: Prescription Question  Contact: [phone]  Does my Modafinil need a PA?

## 2019-09-15 NOTE — PROGRESS NOTES
CC: Snoring, witnessed nocturnal apnea and excessive daytime somnolence, suspected sleep apnea hypopnea syndrome.    HPI:   Ms. Gage is a 38-year-old woman referred by ANETTE Palafox, to assist in the evaluation and management of suspected sleep disordered breathing.    She has a complicated medical history involving fibromyalgia and chronic pain.  She had an episode in 2009 of pneumonia and respiratory failure with prolonged hospitalization requiring tracheostomy and complicated by acute renal failure.    She dates the onset of her daytime fatigue and somnolence to about age 14.  She presented almost 2 years ago to Dr. Ramon with snoring and daytime somnolence.  She had an [place] sleepiness score of 15 points.  Polysomnography on October 19, 2017 suggested an apnea hypopnea index of 7.2 events per hour with a respiratory disturbance index of 7.3 events per hour and a lowest arterial oxygen saturation of 91% on room air.  In that study she spent fewer than 6 minutes and REM sleep but the study was done mostly in the supine body position.  On the following day she underwent a multiple sleep latency study that demonstrated a mean sleep onset latency of 9 minutes and 26 seconds with no sleep onset REM periods.  She was treated for a time with Provigil which seemed to improve her daytime alertness.    She has a regular sleep schedule, as indicated by the sleep diary, with bedtime at about 8 PM and she falls asleep quite quickly.  She does not typically awaken at night.  She arises between 4 and 5:30 AM feeling tired and groggy with frequent morning headaches.  The spousal questionnaire indicates loud snoring and pauses in breathing as well as twitching of the legs or feet during sleep and bruxism.  She is tired during the day and may doze off during quiet moments and naps when the opportunity presents.  Her [place] sleepiness score is 10 points.  She drinks caffeinated beverages moderately and sometimes uses  over-the-counter antihistamines to facilitate sleep onset but she otherwise does not use substances to induce sleep or to maintain wakefulness.  She does use Fioricet occasionally for migraine headaches and methocarbamol for back pain.  She does not have symptoms suggesting narcolepsy and specifically does not have a history suggesting cataplexy, logical hallucinations, sleep paralysis or irresistible sleep episodes.    Her medical history includes a fibromyalgia as well as migraine, irritable bowel syndrome and depression.  She is not using narcotic analgesics.  In addition to inhalation allergies she has a history of asthma and has been on inhaled beclomethasone and albuterol in the past but has not required those medications in recent years.  She does use Singulair 10 mg a day as well as over-the-counter antihistamines and Astelin.    Patient Active Problem List    Diagnosis Date Noted   • Obesity (BMI 30-39.9) 08/30/2019   • History of fracture of right ankle 06/13/2019   • Numbness of right hand 04/17/2019   • Elevated glucose 06/22/2018   • Obesity, Class I, BMI 30.0-34.9 (see actual BMI) 05/23/2018   • Moderate episode of recurrent major depressive disorder (HCC) 05/23/2018   • Asthma in adult without complication 05/23/2018   • Basilar artery migraine, intractable 10/11/2017   • Chronic migraine 10/11/2017   • EEG abnormal 10/11/2017   • Hyperprolactinemia (HCC) 02/13/2013   • Fatigue 09/03/2009   • Preventative health care 09/03/2009   • IBS (irritable bowel syndrome)    • History of alcohol abuse    • History of methamphetamine abuse    • History of hypothyroidism    • Hemorrhoids    • Rectal fissure    • Varicose veins    • Iron deficiency anemia    • Hyperlipidemia    • Low back pain    • EBV positive mononucleosis syndrome        Past Medical History:   Diagnosis Date   • Alcohol addiction (HCC)    • Allergy    • Angina last 2 years    pt attributes to asthma   • Apnea, sleep    • Arthritis current     osteoarthritis   • Back pain    • Bronchitis     4-5x/year   • Chickenpox    • Constipation    • Daytime sleepiness    • Depression    • Dizziness    • Drug addiction (HCC)    • EBV positive mononucleosis syndrome    • Eye pain    • Fatigue    • Fever    • Fracture of finger     rt index x 3   • Frequent headaches    • Gasping for breath    • GERD (gastroesophageal reflux disease)    • Hemorrhoids    • Hyperlipidemia    • Hypothyroidism    • IBS (irritable bowel syndrome)    • Indigestion    • Influenza    • Iron deficiency anemia    • LBP (low back pain)     si joint epidural 2008   • Morning headache    • Painful joint    • Rectal fissure    • Snoring    • Sore muscles    • Varicose veins    • Wears glasses        Past Surgical History:   Procedure Laterality Date   • TRACHEOSTOMY  10/29/2009    Performed by NIDHI LARA at [facility]   • GASTROSTOMY LAPAROSCOPIC  10/29/2009    Performed by NIDHI LARA at [facility]       Family History   Problem Relation Age of Onset   • Hypertension Mother    • Hyperlipidemia Mother    • Alcohol/Drug Mother    • Psychiatric Illness Mother    • Hypertension Father    • Hyperlipidemia Father    • Alcohol/Drug Father    • Psychiatric Illness Father    • Cancer Maternal Grandfather    • Heart Disease Maternal Grandfather    • Heart Disease Paternal Grandfather    • Alcohol/Drug Paternal Grandfather        Social History     Socioeconomic History   • Marital status:      Spouse name: Not on file   • Number of children: Not on file   • Years of education: Not on file   • Highest education level: Not on file   Occupational History   • Not on file   Social Needs   • Financial resource strain: Not on file   • Food insecurity:     Worry: Not on file     Inability: Not on file   • Transportation needs:     Medical: Not on file     Non-medical: Not on file   Tobacco Use   • Smoking status: Former Smoker     Packs/day: 0.50     Years: 19.00      Pack years: 9.50     Types: Cigarettes     Start date: 1998     Last attempt to quit: 8/15/2015     Years since quittin.0   • Smokeless tobacco: Never Used   • Tobacco comment: Quit 2015   Substance and Sexual Activity   • Alcohol use: Yes     Alcohol/week: 0.0 oz     Frequency: 2-3 times a week     Drinks per session: 1 or 2     Binge frequency: Never     Comment: one or two a week   • Drug use: Yes     Types: Methamphetamines     Comment: clean since   Rehab ,  speed   • Sexual activity: Yes     Partners: Male     Birth control/protection: Pill   Lifestyle   • Physical activity:     Days per week: Not on file     Minutes per session: Not on file   • Stress: Not on file   Relationships   • Social connections:     Talks on phone: Not on file     Gets together: Not on file     Attends [employer] witness service: Not on file     Active member of club or organization: Not on file     Attends meetings of clubs or organizations: Not on file     Relationship status: Not on file   • Intimate partner violence:     Fear of current or ex partner: Not on file     Emotionally abused: Not on file     Physically abused: Not on file     Forced sexual activity: Not on file   Other Topics Concern   • Not on file   Social History Narrative   • Not on file       Current Outpatient Medications   Medication Sig Dispense Refill   • omeprazole (PRILOSEC) 20 MG delayed-release capsule Take 20 mg by mouth every day.     • aripiprazole (ABILIFY) 5 MG tablet TAKE 1/2 TABLET BY MOUTH DAILY 45 Tab 2   • metoclopramide (REGLAN) 10 MG Tab 1-3 tab at headache/nausea onset; repeat in 4-6 hours prn 45 Tab 1   • Galcanezumab-gnlm (EMGALITY) 120 MG/ML Solution Auto-injector Inject 1 PEN as instructed Q 4 Weeks. 1 PEN 5   • Diclofenac Sodium 1 % Gel Apply 2-4 g to skin as directed 4 times a day as needed. 1 Tube 2   • venlafaxine XR (EFFEXOR XR) 75 MG CAPSULE SR 24 HR Take 1 Cap by mouth every day. 90 Cap 3   • ondansetron (ZOFRAN) 4 MG  "Tab tablet TAKE 1 TAB BY MOUTH EVERY 8 HOURS AS NEEDED FOR NAUSEA/VOMITING 12 Tab 1   • Methylsulfonylmethane (MSM PO) Take  by mouth.     • indomethacin (INDOCIN) 50 MG Cap Take 50 mg by mouth 2 Times a Day.     • montelukast (SINGULAIR) 10 MG Tab Take 4 mg by mouth.     • Cetirizine HCl 10 MG Cap Take 10 mg by mouth.     • azelastine (ASTELIN) 137 MCG/SPRAY nasal spray      • ACETAMINOPHEN-CAFF-BUTALBITAL -40 MG Cap      • modafinil (PROVIGIL) 200 MG Tab      • MAGNESIUM PO Take  by mouth.     • acetaminophen (TYLENOL) 500 MG TABS Take 500-1,000 mg by mouth every 6 hours as needed. Up to 8 a day     • Ketorolac Tromethamine (SPRIX) 15.75 MG/SPRAY Solution Spray 1 Spray in nose every 6 hours as needed. (Patient not taking: Reported on 8/30/2019) 5 Each 11   • dexamethasone (DECADRON) 4 MG Tab TAKE 3 TABS BY MOUTH DAY 1,THEN 2 TABS DAY 2, THEN 1 TAB DAY 3 (FOR INTRACTABLE MIGRAINE) 6 Tab 0   • promethazine (PHENERGAN) 25 MG Tab Take 1 Tab by mouth every 6 hours as needed for Nausea/Vomiting. (Patient not taking: Reported on 8/30/2019) 30 Tab 6   • Lactobacillus Acidophilus Powder Take  by mouth.     • QVAR 80 MCG/ACT inhaler      • ondansetron (ZOFRAN ODT) 4 MG TBDP Take 1 Tab by mouth every 8 hours as needed for Nausea/Vomiting. 12 Tab 0     No current facility-administered medications for this visit.     \"CURRENT RX\"      Allergies: Ciprofloxacin; Compazine; Diclofenac; Epinephrine; and Latex      ROS  Positive for the sleep, respiratory, allergy, GI and chronic pain issues reviewed above.  She wears corrective lenses but has no diplopia.  She denies tinnitus or loss of auditory acuity, chest pain or palpitations, unusual cough or dyspnea.  She does have a regular menses and the migraine headaches noted above.  All other aspects of the CPT review of systems process are negative as documented in the attached self history form.      Physical Exam:   /60 (BP Location: Left arm, Patient Position: Sitting, " "BP Cuff Size: Large adult)   Pulse 85   Resp 16   Ht 1.575 m (5' 2\")   Wt 82.6 kg (182 lb)   SpO2 98%   BMI 33.29 kg/m²    Head and neck examination demonstrates no mucosal lesion, purulent drainage or evident polyps. The pharynx is benign with a Mallampati III presentation. The neck is supple without thyromegaly. On chest examination there are symmetrical bilateral breath sounds without rales, wheezing or consolidation. On cardiac examination, the apical impulse and heart sounds are normal and the rhythm is regular. There is no murmur, gallop or rub and no jugular venous distention. The abdomen is soft with active bowel sounds and no palpable hepatosplenomegaly, mass, guarding or rebound. The extremities show no clubbing, cyanosis or edema and no signs of deep venous thrombosis. There is no warmth, redness, tenderness or palpable venous cord in the calves. The skin is clear, warm and dry. There is no unusual peripheral lymphadenopathy. Peripheral pulses are palpable in all 4 extremities. On neurologic examination, cranial nerve function is intact, motor tone is symmetrical, and the patient is alert, oriented and responsive.       Problems:  1. Sleep apnea, unspecified type  She presents with snoring, witnessed nocturnal apnea and excessive daytime somnolence.  She has a crowded posterior pharyngeal airway and a body mass index of 33.  A polysomnogram 2 years ago suggested at least some increase in the apnea hypopnea index to 7.2 events per hour may have underestimated the severity of the sleep disordered breathing is very little REM sleep time was included.  The clinical probability of sleep apnea hypopnea syndrome is significant. Accurate diagnosis and effective treatment are required not only to improve levels of daytime alertness but also to reduce the cardiac and neurologic risks associated with untreated sleep apnea. We have discussed diagnostic options including in-laboratory, attended polysomnography " and home sleep testing. We have also discussed treatment options including airway pressurization, reconstructive otolaryngologic surgery, dental appliances and weight management.    2. Hypersomnolence  Perhaps related in part to sleep disordered breathing.  She is spending sufficient nightly time in bed and does not seem to have major issues with sleep hygiene.  Periodic limb movements and chronic pain could interfere with her sleep continuity.  She did improve in the past with modafinil and there may be a component of idiopathic daytime hypersomnolence.  She does not have symptoms suggesting narcolepsy.    3. Snoring    4. Restless leg syndrome  This may be associated with periodic limb movement disorder in sleep.    5. Mild intermittent asthma without complication  She is currently asymptomatic and not requiring medications.    6. Former smoker    7. Obesity (BMI 30-39.9)  We have talked about the role of weight management in the treatment of sleep disordered breathing and the ways in which that might be accomplished.  - Patient identified as having weight management issue.  Appropriate orders and counseling given.      Plan:   1.  Polysomnogram, possible split-night study.    2.  If the polysomnogram is unremarkable a multiple sleep latency study will be done on the following day.  For that reason she is not given a prescription for soporific agent use on the day of the study.    3.  Return visit after the sleep study to review results and treatment options.    We appreciate the opportunity to assist in her care.  Return for after sleep study, with MD.   Answers for HPI/ROS submitted by the patient on 8/27/2019   Year of your last physical exam: 2019  Results of exam: WNL  Occupation : Medical Assistant  Height: 61 in  Current weight: 180  6 months ago: 179  At age 21: 112  What is the reason for your visit today?: Severe daytime fatigue x 10 years plus, nighttime snoring so bad my fiance sleeps in another room,  "chronic migraine and waking at night gasping often with twitching legs  Name of person referring you to the Sleep Center: self  Have you ever been hospitalized?: Yes  Reason, year, and hospital in which you were hospitalized:: 2009 Pneumonia & H1N1 Flu - Tracheostomy and G Tube placed  Have you ever had problems with anesthesia?: No  Have you experienced post-operative delirium?: No  Any complications with surgery?: No  What year did you receive your last Flu shot?: 2018  What year did you receive you last Pneumonia shot?: 2009  Have you had a TB skin test? If so, please list the year and result:: 2009 - negative  Have you had Allergy skin testing? If so, please list the year and result:: 2017 - lots of environmental allergies  Please briefly describe your sleep problem and how old you were when it began.: I have always been a \"bad\" sleeper. Currently sleepy every second of the day with daily headaches and migraines  How does this affect your daily life and activities? Please also rate how serious of a problem this is (1 = Not at all, 10 = Very Serious).: Everyday severe headaches and often feel I will fall asleep at my desk.  Have you had any previous evaluations, examinations, or treatment for this sleep problem or any other problems with your sleep? If so, please describe the evaluation, treatment, and results.: Sleep Study 2017 - brought with me  Have you used any medications (prescribed or otherwise) to help your sleep problem? If yes, include name, amount, frequency, and the prescribing physician.: Modafinil helps greatly with daytime brain fog and chronic fatigue, melatonin and benadryl help at night, trazodone tried over 10 years ago  If employed, what time do you usually start and end work?: 6:45-7  Do you ever change work shifts? If yes, describe how often (never, infrequently, regularly).: often stay until 5 or later  What time do you usually go to bed and wake up on: Weekdays? Weekends?: 8 - 9 pm " weekdays 9-10 weekends  Do you have a regular bed partner?: Yes  How many minutes does it usually take to fall asleep at night after turning off the lights?: 8  What do you ordinarily do just prior to turning out the lights and attempting to go to sleep (e.g., reading, TV, baths, etc.)?: bath, wash face & brush teeth  On average, how many times do you wake up during the night?: unsure as I don't remember  On average, how many times do you wake up to use the bathroom?: 0  Do you often wake up too early in the morning and are unable to return to sleep?: never  On average, how many hours of sleep do you get per night?: 8-9  How do you usually awaken?  Alarm, spontaneously, or other?: alarm of my cats  Is it difficult for you to awaken and get out of bed after sleeping? (Not at all, Sometimes, Very): very  Do you nap or return to bed after arising?: not on weekdays  Are you bothered by sleepiness during the day?: every second of the day.  Do you feel that you get too much sleep at night?: no  Do you feel that you get too little sleep at night?: very much so  Do you usually feel tired during the day? If so, what do you attribute this to?: I feel I don't get a restful sleep  Do you find yourself falling asleep when you don't mean to? : i feel like it, but it has only happened twice in the last 3 years - once on the road and once at my desk  If yes, how long does your sleep episode last?: seconds  Do you feel rested or refreshed after the sleep episode?: No  Have you ever suddenly fallen?: yes, with my hemiplegic/basilar migraines  Have you ever experienced sudden body weakness?: often with my migraines - mainly one sided  If yes, were you aware of the things around you?: Yes  Was the weakness brought on by any particular event or feeling? If so, briefly describe.: Stabbing headaches with nausea  Have you ever experienced weakness or paralysis upon going to sleep?: no  Have you ever experienced weakness or paralysis upon  "awakening from sleep?: no  Have you ever experienced seeing things or hearing voices/noises: That weren't real? On going to sleep? During the night? On awakening from sleep? During the day?: no  Do you have difficulty breathing at night? If yes, briefly describe.: My fibraxton states I gasp for air and I stop breathing often  How many times per week?: every night  How did you become aware of this, at what age did this first occur, and how many years has this occurred?: I belive it's been worsening over the last 6 years - my fibraxton is the first to mention it over one year ago  Have you been told you snore while asleep? If so, does it disturb a bed partner (or someone in the same room), or someone in the next room?: YES!! Very loudly - so loud my fibraxton sleeps in another room  Have you ever experienced doing something without being aware of the action? If yes, please describe.: No  Have you ever experienced upon lying in bed before sleep or on awakening from sleep: Restlessness of legs, \"nervous legs\", \"creeping crawling\" sensation of legs, or twitching of legs?: Yes - its like a deep itch  How many times per week does this occur, and how many minutes does the sensation last?: 3-4 minutes/ 2x per week  Does anything relieve the sensations (e.g., getting out of bed, medication, massage)?: Yes  At what age did this first occur, and how many years has this occurred?: 10 years ago  Have you ever been told that your arms or legs jerk or twitch while you are asleep? If yes, how many times per night does this occur?: Yes - I've been known to flail & hit my head and hands against the wall  At what age did this first occur, and how many years has this occurred?: My mother noted it  in my adolescent years  Does this seem to awaken you from your sleep?: No  Do you know, or have you ever been told that you do any of the following while sleeping: talk, walk, grit teeth, wet the bed, wake up screaming or seemingly afraid, have " disturbing dreams, have unusual movements, wake up with headaches, (males) have erections? If yes to any of these, please indicate how many times per week, age started, last occurrence, treatment received.: Unusual movements and flailing  x 2 years - my fiance stated this roughly 1.5 years ago - I have woken with headaches for 15 years  Has anyone in your family been known to have any sleep problems? If yes, please list the type of problem (e.g., trouble getting to sleep, too sleepy, bed wetting, etc.), the relationship of this person to you, and the treatment received.: Mf father snores very loudly and I have heard him gasp for air in the past

## 2019-09-16 NOTE — TELEPHONE ENCOUNTER
I don't see an Rx for Modafinil.  Do you know what the strength and sig are?  Please advise, thank you.

## 2019-09-17 ENCOUNTER — TELEPHONE (OUTPATIENT)
Dept: PULMONOLOGY | Facility: HOSPICE | Age: 39
End: 2019-09-17

## 2019-09-17 ENCOUNTER — TELEPHONE (OUTPATIENT)
Dept: SLEEP MEDICINE | Facility: MEDICAL CENTER | Age: 39
End: 2019-09-17

## 2019-09-17 DIAGNOSIS — G47.10 HYPERSOMNOLENCE: ICD-10-CM

## 2019-09-17 RX ORDER — MODAFINIL 200 MG/1
200 TABLET ORAL DAILY
Qty: 30 TAB | Refills: 0 | Status: SHIPPED
Start: 2019-09-17 | End: 2019-10-17

## 2019-09-17 NOTE — TELEPHONE ENCOUNTER
Per Dr. Irvin, Order written for 1 month supply only pending her polysomnogram and possible multiple sleep latency study.  She should not take modafinil on the day of the polysomnogram and not on the day before either.

## 2019-09-17 NOTE — TELEPHONE ENCOUNTER
MEDICATION PRIOR AUTHORIZATION NEEDED:    1. Name of Medication: Modafinil 200 mg    2. Requested By (Name of Pharmacy): [facility]     3. Is insurance on file current? yes    4. What is the name & phone number of the 3rd party payor? [facility] [phone]

## 2019-09-17 NOTE — TELEPHONE ENCOUNTER
"Pt stated it was discussed at NP apt, the note does mention \" She was treated for a time with Provigil which seemed to improve her daytime alertness.\" She takes it 200 mg BID PRN    Per pt Albaro said he would fill it, Will forward to Dr. Irvin but he is out of the office.  "

## 2019-09-17 NOTE — TELEPHONE ENCOUNTER
My chart message sent to pt asking about Rx dose.    Also we haven't given Rx for this before, she needs to do her SS and follow up to discuss.    She can try getting refill from original prescribing doc or PCP.

## 2019-09-20 ENCOUNTER — OFFICE VISIT (OUTPATIENT)
Dept: NEUROLOGY | Facility: MEDICAL CENTER | Age: 39
End: 2019-09-20
Payer: COMMERCIAL

## 2019-09-20 VITALS
TEMPERATURE: 98.6 F | SYSTOLIC BLOOD PRESSURE: 112 MMHG | HEIGHT: 61 IN | RESPIRATION RATE: 16 BRPM | HEART RATE: 85 BPM | WEIGHT: 182 LBS | BODY MASS INDEX: 34.36 KG/M2 | OXYGEN SATURATION: 98 % | DIASTOLIC BLOOD PRESSURE: 66 MMHG

## 2019-09-20 DIAGNOSIS — G43.119 BASILAR ARTERY MIGRAINE, INTRACTABLE: Primary | ICD-10-CM

## 2019-09-20 PROCEDURE — 99213 OFFICE O/P EST LOW 20 MIN: CPT | Performed by: PSYCHIATRY & NEUROLOGY

## 2019-09-20 RX ORDER — PROMETHAZINE HYDROCHLORIDE 25 MG/1
25 TABLET ORAL EVERY 6 HOURS PRN
Qty: 30 TAB | Refills: 6 | Status: SHIPPED | OUTPATIENT
Start: 2019-09-20 | End: 2020-10-09

## 2019-09-20 RX ORDER — ONDANSETRON 4 MG/1
TABLET, FILM COATED ORAL
Qty: 30 TAB | Refills: 3 | Status: SHIPPED | OUTPATIENT
Start: 2019-09-20 | End: 2020-10-09

## 2019-09-20 RX ORDER — KETOROLAC TROMETHAMINE 30 MG/ML
60 INJECTION, SOLUTION INTRAMUSCULAR; INTRAVENOUS ONCE
Status: COMPLETED | OUTPATIENT
Start: 2019-09-20 | End: 2019-09-20

## 2019-09-20 RX ORDER — ONDANSETRON 2 MG/ML
4 INJECTION INTRAMUSCULAR; INTRAVENOUS ONCE
Status: COMPLETED | OUTPATIENT
Start: 2019-09-20 | End: 2019-09-20

## 2019-09-20 RX ADMIN — KETOROLAC TROMETHAMINE 60 MG: 30 INJECTION, SOLUTION INTRAMUSCULAR; INTRAVENOUS at 09:30

## 2019-09-20 RX ADMIN — ONDANSETRON 4 MG: 2 INJECTION INTRAMUSCULAR; INTRAVENOUS at 09:30

## 2019-09-20 ASSESSMENT — ENCOUNTER SYMPTOMS
PHOTOPHOBIA: 1
NAUSEA: 1
MEMORY LOSS: 0
HEADACHES: 1

## 2019-09-20 NOTE — LETTER
2019        Bree Gage  : 1980    To whom it may concern:    Ms. Gage suffers from intractable basilar artery migraine, resulting in intermittent and sustained to total incapacity during a headache attack.  I have been treating her since 2019 for this condition, she has been followed by various healthcare providers in this clinic dating back to .  Her headache condition itself started in her mid-teens.    [facility] paperwork has been completed for her when she was employed elsewhere including [facility] [employer] [facility] [employer].  Records indicate total, temporary disability with her headaches occurring at that time.    If there are questions in regards to this patient's condition, the office can be contacted.    Sincerely:        Frankie Amos M.D.

## 2019-09-20 NOTE — TELEPHONE ENCOUNTER
DOCUMENTATION OF PRIOR AUTH STATUS    1. Medication name and dose: Modafinil 200 mg    2. Name and Phone # of Prescription coverage company: [employer] [phone]    3. Date Prior Auth was submitted: 9/17/2019    4. What information was given to obtain insurance decision: Clinical notes    5. Prior Auth letter Approved or Denied: Approved through 9/19/2020    6. Pharmacy notified: Yes    7. Patient notified: Yes

## 2019-09-20 NOTE — PROGRESS NOTES
Subjective:      Bree Gage is a 38 y.o. 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    HPI    Seen just 1 month ago, she had been having refractory headaches recur, dealing as much with a headache pain as she is with the persistent nausea and vomiting, heightened sensitivities and impaired concentration.  She fit criteria for chronic migraine having more than 15 days/month of severe and incapacitating headache, typically the numbers are more than 20 in a month.    We started Emgality, she is coming up on her second injection, there may be some improvement.  Reglan was used with rescue, unfortunately the drug provided no benefit with nausea, the nausea is not nearly continuous.  She uses Phenergan 25 mg but only at nighttime because the drug makes her too sleepy to function during the daytime.  Zofran worked for the nausea more effectively.  Unfortunately Sprix nasal spray has yet to be authorized.  She has already failed most triptans.  She has Fioricet to use as needed.    She is suffering from a rather severe headache now, the nausea quite incapacitating.  She just moved last week, the headaches really took off at that time because of the anxiety and sleep deprivation that comes with moving.  She is asking for a letter that was requested by HR stating the onset of her condition.    Medical, surgical and family histories are reviewed in the electronic health record, there are no new drug allergies.  She is on Emgality 120 mg injections monthly, Provigil 200 mg daily, Effexor, Phenergan 25 mill grams nightly as needed, Prilosec, Reglan 10 mg as needed, Indocin 50 mg, twice daily, Abilify, the rest as per the electronic health record.    Review of Systems   Constitutional: Positive for malaise/fatigue.   Eyes: Positive for photophobia.   Gastrointestinal: Positive for nausea.   Neurological: Positive for headaches.   Psychiatric/Behavioral:  "Negative for memory loss.   All other systems reviewed and are negative.       Objective:     /66 (BP Location: Right arm, Patient Position: Sitting, BP Cuff Size: Adult)   Pulse 85   Temp 37 °C (98.6 °F) (Temporal)   Resp 16   Ht 1.549 m (5' 0.98\")   Wt 82.6 kg (182 lb)   SpO2 98%   BMI 34.41 kg/m²      Physical Exam    She appears in notable distress complaining of headache and nausea.  Her vital signs are stable.  She is cooperative.  There is no malar rash or jaw claudication.  The neck is supple, range of motion is full.  Cardiac evaluation is unremarkable.    In quick and cursory fashion, mental status, cranial nerve, musculoskeletal and coordination evaluations remain intact.     Assessment/Plan:     1. Basilar artery migraine, intractable  She does remain in a relatively refractory state, but she has only had one injection of Emgality, data shows patients achieved best benefit after several months in series.  In this state she has significant disability associated with it, I can certainly write a letter dictating the same though I can only date this from the first evaluation from last month.  She was seen by other professionals in this office, FMLA paperwork had been completed by them, already part of the electronic health record, HR can review these as well.  Hopefully Sprix nasal spray can be authorized.    For the headache and nausea she has today, Toradol and Zofran can be provided.  She and I can follow-up otherwise as previously scheduled in the next 4 months.    - ondansetron (ZOFRAN) 4 MG Tab tablet; 1-2 tab every 4-6 hours prn for nausea  Dispense: 30 Tab; Refill: 3  - promethazine (PHENERGAN) 25 MG Tab; Take 1 Tab by mouth every 6 hours as needed for Nausea/Vomiting.  Dispense: 30 Tab; Refill: 6  - ketorolac (TORADOL) injection 60 mg  - ondansetron (ZOFRAN) syringe/vial injection 4 mg    Time: 20-minute spent face-to-face for exam, review, discussion, and education, of this over 50% of " the time spent counseling and coordinating care.

## 2019-09-23 ENCOUNTER — PATIENT MESSAGE (OUTPATIENT)
Dept: NEUROLOGY | Facility: MEDICAL CENTER | Age: 39
End: 2019-09-23

## 2019-09-23 DIAGNOSIS — G43.111 INTRACTABLE MIGRAINE WITH AURA WITH STATUS MIGRAINOSUS: ICD-10-CM

## 2019-09-24 RX ORDER — METOCLOPRAMIDE 10 MG/1
TABLET ORAL
Qty: 45 TAB | Refills: 0 | Status: SHIPPED | OUTPATIENT
Start: 2019-09-24 | End: 2020-09-04

## 2019-09-25 NOTE — PATIENT COMMUNICATION
Called [facility] pharmacy per dr Amos Rx sent in August, for Emgality maintenance dose 120mg /ml pen injector to inject 1 pen SQ every 4 weeks Qty 1 with 5 refills. Pharmacy verbally Agreed

## 2019-09-27 ENCOUNTER — PATIENT MESSAGE (OUTPATIENT)
Dept: MEDICAL GROUP | Facility: MEDICAL CENTER | Age: 39
End: 2019-09-27

## 2019-09-27 ENCOUNTER — NON-PROVIDER VISIT (OUTPATIENT)
Dept: MEDICAL GROUP | Facility: MEDICAL CENTER | Age: 39
End: 2019-09-27
Payer: COMMERCIAL

## 2019-09-27 DIAGNOSIS — Z23 NEED FOR VACCINATION: ICD-10-CM

## 2019-09-27 PROCEDURE — 90471 IMMUNIZATION ADMIN: CPT | Performed by: NURSE PRACTITIONER

## 2019-09-27 PROCEDURE — 90686 IIV4 VACC NO PRSV 0.5 ML IM: CPT | Performed by: NURSE PRACTITIONER

## 2019-09-27 NOTE — TELEPHONE ENCOUNTER
From: Bree Gage  To: XUAN Ko  Sent: 9/27/2019 9:27 AM PDT  Subject: Non-Urgent Medical Question    It's flu season!!  I was wondering - due to my history of H1N1, I have received the High Dose flu vaccine in the past - would you recommend that for me again this year?    Whichever you prefer is great for me & if you could order it & I will make an MA visit soon     Thank you!!!

## 2019-09-27 NOTE — PROGRESS NOTES
"Bree Gage is a 38 y.o. adult here for a non-provider visit for:   FLU    Reason for immunization: Annual Flu Vaccine  Immunization records indicate need for vaccine: Yes, confirmed with [facility]  Minimum interval has been met for this vaccine: Yes  ABN completed: Yes    Order and dose verified by: MR MOYA Dated  8/15/19 was given to patient: Yes  All IAC Questionnaire questions were answered \"No.\"    Patient tolerated injection and no adverse effects were observed or reported: Yes    Pt scheduled for next dose in series: Not Indicated  "

## 2019-10-24 ENCOUNTER — TELEPHONE (OUTPATIENT)
Dept: NEUROLOGY | Facility: MEDICAL CENTER | Age: 39
End: 2019-10-24

## 2019-10-24 ENCOUNTER — PATIENT MESSAGE (OUTPATIENT)
Dept: MEDICAL GROUP | Facility: MEDICAL CENTER | Age: 39
End: 2019-10-24

## 2019-10-24 RX ORDER — PREDNISONE 10 MG/1
TABLET ORAL
Qty: 45 TAB | Refills: 0 | Status: SHIPPED | OUTPATIENT
Start: 2019-10-24 | End: 2020-02-18

## 2019-10-24 NOTE — TELEPHONE ENCOUNTER
From: Bree Gage  To: XUAN Ko  Sent: 10/24/2019 2:19 PM PDT  Subject: Non-Urgent Medical Question    Good Afternoon!  I have some paperwork from [place] [employer] (for my Semi FMLA/Semi Disability for the hours I miss when I am out for my migraines) that needs to be filled out.    Dr Doshi does not fill out these types of forms but my old Neurologist used to fill them out :(   Their office requested that my primary fill these out. I will be getting FMLA in February 2020 for my Chronic migraines.    In the meantime, are you able to fill out this for me?

## 2019-10-25 NOTE — TELEPHONE ENCOUNTER
I forwarded a prescription for prednisone 60 mg titration, standard steroid titration to use for patients with refractory migraine.  It starts as a 60 mg dose for 2 days, reducing the dose by 10 mg every other day until she is off.  This was done today, October 24, 2019.  Hopefully the pharmacy will notify the patient when it is available.

## 2019-10-25 NOTE — TELEPHONE ENCOUNTER
----- Message from [facility] sent at 10/24/2019 10:31 AM PDT -----  Regarding: FW: Prescription Question  Contact: [phone]  Please advise  I will look to see what is going on with the Nasal spray  ----- Message -----  From: Bree Gage  Sent: 10/21/2019   8:29 AM PDT  To: Neurology [facility]  Subject: Prescription Question                            May I please have a Steroid to break this 17 day migraine? Last I used Dexamethasone over 2 months ago.  Also, what is the status of the PA for my Sprix nasal spray?

## 2019-11-03 ENCOUNTER — SLEEP STUDY (OUTPATIENT)
Dept: SLEEP MEDICINE | Facility: MEDICAL CENTER | Age: 39
End: 2019-11-03
Attending: INTERNAL MEDICINE
Payer: COMMERCIAL

## 2019-11-03 DIAGNOSIS — G47.30 SLEEP APNEA, UNSPECIFIED TYPE: ICD-10-CM

## 2019-11-04 ENCOUNTER — APPOINTMENT (OUTPATIENT)
Dept: SLEEP MEDICINE | Facility: MEDICAL CENTER | Age: 39
End: 2019-11-04
Attending: INTERNAL MEDICINE
Payer: COMMERCIAL

## 2019-11-04 NOTE — PROCEDURES
Technical summary:The patient underwent a diagnostic polysomnogram. This was a 16 channel montage study to include a 6 channel EEG, a 2 channel EOG, and chin EMG, left and right leg EMG, a snore channel, a nasal pressure transducer, and a nasal oral airflow thermistor.   Respiratory effort was assessed with the use of a thoracic and abdominal monitor and overnight oximetry was obtained. Audio and video recordings were reviewed. This was a fully attended study and sleep stage scoring was performed. The test was technically adequate.       Scoring Criteria: A modification of the the AASM Manual for the Scoring of Sleep and Associated Events.Obstructive apnea was scored by cessation of airflow for at least 10 seconds with continuing respiratory effort.  Central apnea was scored by cessation of airflow for at least 10 seconds with no effort.  Hypopnea was scored by a 30% or more reduction in airflow for at least 10 seconds accompanied by an arterial oxygen desaturation of 3% or more.  (For Medicare patients, hypopneas were scored by a 30% or more reduction in airflow for at least 10 seconds accompanied by an arterial oxygen saturation of 4% or more, as required by their insurance, CMS.    General sleep summary:  General sleep summary:  During the overnight study, the sleep latency was 5 min which is normal. The REM latency was 206 min which is increased. The total sleep time was 518 min and sleep efficiency was 87 % which is normal.  Sleep stage proportions showed increased WASO of 71 min, normal N3 and decreased REM sleep.  In regards to sleep quality there was a increased degree of sleep fragmentation as shown by the arousal index of 12 an hourThe arousals were due to spontaneous arousal and PLMS.    Respiratory summary: During the overnight study, the patient demonstrated mild obstructive sleep apnea as shown by the apnea hypopnea index of 7.6/hr. There was a total of 2 apneas and 64 hypopneas. In the supine position  the respiratory disturbance index was 23.5 an hour and in the non-supine position the respiratory disturbance index was 6.8 per hour. The respiratory events were associated with O2 mira of 92% and average O2 saturation of 95%. She spent 0 min of sleep time below 89% O2 saturation. There was snoring. The patient demonstrated a NSR with an average heartbeat of 66 bpm.     Periodic limb movement summary: The patient demonstrated an severe degree of periodic limb movements as shown by the PLM index of 48 per hour.PLM arousal index was 4.1/hr      Impression:  1.  Mild OAS with AHI of 7.2/hr and O2 mira 92 %. However it is is likely underestimated due to short supine sleep.  2.  PLMS   3. Delayed REM latency     Recommendations:  Recommend the patient return for a CPAP/BiPAP titration. In some cases alternative treatment options may prove effective in resolving sleep apnea and these options include upper airway surgery, the use of a dental orthotic or weight loss and positional therapy. Clinical correlation is required. In general patients with sleep apnea are advised to avoid alcohol and sedatives and to not operate a motor vehicle while drowsy and are at a greater risk for cardiovascular disease.    MLST was deferred due to untreated ROME and sever PLMS. Consider MSLT if she continues to have excessive daytime hypersomnia after the treatment of ROME and PLMS.

## 2019-11-07 ENCOUNTER — TELEPHONE (OUTPATIENT)
Dept: SLEEP MEDICINE | Facility: MEDICAL CENTER | Age: 39
End: 2019-11-07

## 2019-11-07 DIAGNOSIS — G47.10 HYPERSOMNIA: ICD-10-CM

## 2019-11-07 RX ORDER — MODAFINIL 200 MG/1
200 TABLET ORAL DAILY
Qty: 30 TAB | Refills: 1 | Status: SHIPPED | OUTPATIENT
Start: 2019-11-07 | End: 2019-12-07

## 2019-11-07 NOTE — TELEPHONE ENCOUNTER
----- Message from Bree Gage sent at 11/6/2019 11:49 AM PST -----  Regarding: Non-Urgent Medical Question  Contact: [phone]  I see my Sleep Study results and cannot wait to follow up with you about the results.    I am asking for a refill on my Modafinil.  My last fill was 9/20/2019 and I think with this new discovery it will a staple in my daily routine as work is busy as always!     I use [employer] on [address].

## 2019-11-13 ENCOUNTER — PATIENT MESSAGE (OUTPATIENT)
Dept: MEDICAL GROUP | Facility: MEDICAL CENTER | Age: 39
End: 2019-11-13

## 2019-11-15 ENCOUNTER — SLEEP CENTER VISIT (OUTPATIENT)
Dept: SLEEP MEDICINE | Facility: MEDICAL CENTER | Age: 39
End: 2019-11-15
Payer: COMMERCIAL

## 2019-11-15 VITALS
HEART RATE: 86 BPM | DIASTOLIC BLOOD PRESSURE: 80 MMHG | OXYGEN SATURATION: 99 % | SYSTOLIC BLOOD PRESSURE: 114 MMHG | HEIGHT: 61 IN | BODY MASS INDEX: 33.61 KG/M2 | WEIGHT: 178 LBS | RESPIRATION RATE: 14 BRPM

## 2019-11-15 DIAGNOSIS — J45.20 MILD INTERMITTENT ASTHMA WITHOUT COMPLICATION: ICD-10-CM

## 2019-11-15 DIAGNOSIS — G47.10 HYPERSOMNOLENCE: ICD-10-CM

## 2019-11-15 DIAGNOSIS — R06.83 SNORING: ICD-10-CM

## 2019-11-15 DIAGNOSIS — G47.33 OBSTRUCTIVE SLEEP APNEA SYNDROME: ICD-10-CM

## 2019-11-15 DIAGNOSIS — G25.81 RESTLESS LEG SYNDROME: ICD-10-CM

## 2019-11-15 DIAGNOSIS — Z87.891 FORMER SMOKER: ICD-10-CM

## 2019-11-15 DIAGNOSIS — G47.61 PERIODIC LIMB MOVEMENT DISORDER (PLMD): ICD-10-CM

## 2019-11-15 PROCEDURE — 99214 OFFICE O/P EST MOD 30 MIN: CPT | Performed by: INTERNAL MEDICINE

## 2019-11-15 NOTE — PROGRESS NOTES
CC: Snoring, witnessed nocturnal apnea and excessive daytime somnolence, suspected sleep apnea hypopnea syndrome.     HPI:   Ms. Gage is a 38-year-old woman referred by ANETTE Palafox, to assist in the evaluation and management of suspected sleep disordered breathing.  He was initially evaluated on August 30, 2019 and returns now to review the results of the polysomnogram.     She has a complicated medical history involving fibromyalgia and chronic pain.  She had an episode in 2009 of pneumonia and respiratory failure with prolonged hospitalization requiring tracheostomy and complicated by acute renal failure.     She dates the onset of her daytime fatigue and somnolence to about age 14.  She presented almost 2 years ago to Dr. Ramon with snoring and daytime somnolence.  She had an [place] sleepiness score of 15 points.  Polysomnography on October 19, 2017 suggested an apnea hypopnea index of 7.2 events per hour with a respiratory disturbance index of 7.3 events per hour and a lowest arterial oxygen saturation of 91% on room air.  In that study she spent fewer than 6 minutes in REM sleep but the study was done mostly in the supine body position.  On the following day she underwent a multiple sleep latency study that demonstrated a mean sleep onset latency of 9 minutes and 26 seconds with no sleep onset REM periods.  She was treated for a time with Provigil which seemed to improve her daytime alertness.     She has a regular sleep schedule, as indicated by the sleep diary, with bedtime at about 8 PM and she falls asleep quite quickly.  She does not typically awaken at night.  She arises between 4 and 5:30 AM feeling tired and groggy with frequent morning headaches.  The spousal questionnaire indicates loud snoring and pauses in breathing as well as twitching of the legs or feet during sleep and bruxism.  She is tired during the day and may doze off during quiet moments and naps when the opportunity presents.  Her  [place] sleepiness score is 10 points.  She drinks caffeinated beverages moderately and sometimes uses over-the-counter antihistamines to facilitate sleep onset but she otherwise does not use substances to induce sleep or to maintain wakefulness.  She does use Fioricet occasionally for migraine headaches and methocarbamol for back pain.  She does not have symptoms suggesting narcolepsy and specifically does not have a history suggesting cataplexy, logical hallucinations, sleep paralysis or irresistible sleep episodes.     Her medical history includes a fibromyalgia as well as migraine, irritable bowel syndrome and depression.  She is not using narcotic analgesics.  In addition to inhalation allergies she has a history of asthma and has been on inhaled beclomethasone and albuterol in the past but has not required those medications in recent years.  She does use Singulair 10 mg a day as well as over-the-counter antihistamines and Astelin.    At the initial office visit on August 30, 2019 a polysomnogram was requested and prescription written for modafinil.  She is using 100 mg of that medication in the morning and a second 100 mg at midday which has markedly improved her level of daytime alertness.  She has not noticed any increase in headache frequency and not had side effects such as agitation, nausea, palpitations or trouble sleeping.    The polysomnogram dated November 3, 2019 is reviewed with the patient.  It shows some fragmentation of sleep with increased wake after sleep onset time but 76 minutes of REM sleep, included.  There are frequent periodic limb movements in the periodic limb movement with arousal index is 4.1 events per hour.  The apnea hypopnea index is 7.6 events per hour including hypopnea episodes with just 2 episodes of central apnea.  The index rises to 23.5 events per hour in supine sleep.  The lowest arterial oxygen saturation is 89% on room air.        Patient Active Problem List    Diagnosis  Date Noted   • Obesity (BMI 30-39.9) 08/30/2019   • History of fracture of right ankle 06/13/2019   • Numbness of right hand 04/17/2019   • Elevated glucose 06/22/2018   • Obesity, Class I, BMI 30.0-34.9 (see actual BMI) 05/23/2018   • Moderate episode of recurrent major depressive disorder (HCC) 05/23/2018   • Asthma in adult without complication 05/23/2018   • Basilar artery migraine, intractable 10/11/2017   • Chronic migraine 10/11/2017   • EEG abnormal 10/11/2017   • Hyperprolactinemia (HCC) 02/13/2013   • Fatigue 09/03/2009   • Preventative health care 09/03/2009   • IBS (irritable bowel syndrome)    • History of alcohol abuse    • History of methamphetamine abuse ([facility])    • History of hypothyroidism    • Hemorrhoids    • Rectal fissure    • Varicose veins    • Iron deficiency anemia    • Hyperlipidemia    • Low back pain    • EBV positive mononucleosis syndrome        Past Medical History:   Diagnosis Date   • Alcohol addiction ([facility])    • Allergy    • Angina last 2 years    pt attributes to asthma   • Apnea, sleep    • Arthritis current    osteoarthritis   • Back pain    • Bronchitis     4-5x/year   • Chickenpox    • Constipation    • Daytime sleepiness    • Depression    • Dizziness    • Drug addiction ([facility])    • EBV positive mononucleosis syndrome    • Eye pain    • Fatigue    • Fever    • Fracture of finger     rt index x 3   • Frequent headaches    • Gasping for breath    • GERD (gastroesophageal reflux disease)    • Hemorrhoids    • Hyperlipidemia    • Hypothyroidism    • IBS (irritable bowel syndrome)    • Indigestion    • Influenza    • Iron deficiency anemia    • LBP (low back pain)     si joint epidural 2008   • Morning headache    • Painful joint    • Rectal fissure    • Snoring    • Sore muscles    • Varicose veins    • Wears glasses        Past Surgical History:   Procedure Laterality Date   • TRACHEOSTOMY  10/29/2009    Performed by NIDHI LARA at [facility] ORS   • GASTROSTOMY  LAPAROSCOPIC  10/29/2009    Performed by NIDHI LARA at [facility]       Family History   Problem Relation Age of Onset   • Hypertension Mother    • Hyperlipidemia Mother    • Alcohol/Drug Mother    • Psychiatric Illness Mother    • Hypertension Father    • Hyperlipidemia Father    • Alcohol/Drug Father    • Psychiatric Illness Father    • Cancer Maternal Grandfather    • Heart Disease Maternal Grandfather    • Heart Disease Paternal Grandfather    • Alcohol/Drug Paternal Grandfather        Social History     Socioeconomic History   • Marital status:      Spouse name: Not on file   • Number of children: Not on file   • Years of education: Not on file   • Highest education level: Not on file   Occupational History   • Not on file   Social Needs   • Financial resource strain: Not on file   • Food insecurity:     Worry: Not on file     Inability: Not on file   • Transportation needs:     Medical: Not on file     Non-medical: Not on file   Tobacco Use   • Smoking status: Former Smoker     Packs/day: 0.50     Years: 19.00     Pack years: 9.50     Types: Cigarettes     Start date: 1998     Last attempt to quit: 8/15/2015     Years since quittin.2   • Smokeless tobacco: Never Used   • Tobacco comment: Quit 2015   Substance and Sexual Activity   • Alcohol use: Yes     Alcohol/week: 0.0 oz     Frequency: 2-3 times a week     Drinks per session: 1 or 2     Binge frequency: Never     Comment: one or two a week   • Drug use: Yes     Types: Methamphetamines     Comment: clean since   Rehab ,  speed   • Sexual activity: Yes     Partners: Male     Birth control/protection: Pill   Lifestyle   • Physical activity:     Days per week: Not on file     Minutes per session: Not on file   • Stress: Not on file   Relationships   • Social connections:     Talks on phone: Not on file     Gets together: Not on file     Attends [employer] service: Not on file     Active member of club or  organization: Not on file     Attends meetings of clubs or organizations: Not on file     Relationship status: Not on file   • Intimate partner violence:     Fear of current or ex partner: Not on file     Emotionally abused: Not on file     Physically abused: Not on file     Forced sexual activity: Not on file   Other Topics Concern   • Not on file   Social History Narrative   • Not on file       Current Outpatient Medications   Medication Sig Dispense Refill   • modafinil (PROVIGIL) 200 MG Tab Take 1 Tab by mouth every day for 30 days. 30 Tab 1   • predniSONE (DELTASONE) 10 MG Tab 6 tab daily for 2 days, then reduce by 1 tab every other day until off 45 Tab 0   • metoclopramide (REGLAN) 10 MG Tab TAKE 1 TO 3 TABS BY MOUTH AT ONSET OF HEADACHE/NAUSEA.REPEAT IN 4 TO 6 HOURS AS NEEDED 45 Tab 0   • ondansetron (ZOFRAN) 4 MG Tab tablet 1-2 tab every 4-6 hours prn for nausea 30 Tab 3   • promethazine (PHENERGAN) 25 MG Tab Take 1 Tab by mouth every 6 hours as needed for Nausea/Vomiting. 30 Tab 6   • omeprazole (PRILOSEC) 20 MG delayed-release capsule Take 20 mg by mouth every day.     • aripiprazole (ABILIFY) 5 MG tablet TAKE 1/2 TABLET BY MOUTH DAILY 45 Tab 2   • Ketorolac Tromethamine (SPRIX) 15.75 MG/SPRAY Solution Spray 1 Spray in nose every 6 hours as needed. 5 Each 11   • Galcanezumab-gnlm (EMGALITY) 120 MG/ML Solution Auto-injector Inject 1 PEN as instructed Q 4 Weeks. 1 PEN 5   • Diclofenac Sodium 1 % Gel Apply 2-4 g to skin as directed 4 times a day as needed. 1 Tube 2   • venlafaxine XR (EFFEXOR XR) 75 MG CAPSULE SR 24 HR Take 1 Cap by mouth every day. 90 Cap 3   • Methylsulfonylmethane (MSM PO) Take  by mouth.     • indomethacin (INDOCIN) 50 MG Cap Take 50 mg by mouth 2 Times a Day.     • Lactobacillus Acidophilus Powder Take  by mouth.     • montelukast (SINGULAIR) 10 MG Tab Take 4 mg by mouth.     • Cetirizine HCl 10 MG Cap Take 10 mg by mouth.     • azelastine (ASTELIN) 137 MCG/SPRAY nasal spray      •  "ACETAMINOPHEN-CAFF-BUTALBITAL -40 MG Cap      • MAGNESIUM PO Take  by mouth.     • acetaminophen (TYLENOL) 500 MG TABS Take 500-1,000 mg by mouth every 6 hours as needed. Up to 8 a day     • QVAR 80 MCG/ACT inhaler        No current facility-administered medications for this visit.     \"CURRENT RX\"      Allergies: Ciprofloxacin; Compazine; Diclofenac; Epinephrine; and Latex      ROS  Unchanged from prior and positive for the sleep, respiratory, allergy, GI and chronic pain issues reviewed above.  She wears corrective lenses but has no diplopia.  She denies tinnitus or loss of auditory acuity, chest pain or palpitations, unusual cough or dyspnea.  She does have a regular menses and the migraine headaches noted above.  All other aspects of the CPT review of systems process are negative       Physical Exam:   /80 (BP Location: Right arm, Patient Position: Sitting, BP Cuff Size: Adult)   Pulse 86   Resp 14   Ht 1.549 m (5' 1\")   Wt 80.7 kg (178 lb)   SpO2 99%   BMI 33.63 kg/m²    Head and neck examination demonstrates no mucosal lesion, purulent drainage or evident polyps. The pharynx is benign with a Mallampati III presentation. The neck is supple without thyromegaly. On chest examination there are symmetrical bilateral breath sounds without rales, wheezing or consolidation. On cardiac examination, the apical impulse and heart sounds are normal and the rhythm is regular. There is no murmur, gallop or rub and no jugular venous distention. The abdomen is soft with active bowel sounds and no palpable hepatosplenomegaly, mass, guarding or rebound. The extremities show no clubbing, cyanosis or edema and no signs of deep venous thrombosis. There is no warmth, redness, tenderness or palpable venous cord in the calves. The skin is clear, warm and dry. There is no unusual peripheral lymphadenopathy. Peripheral pulses are palpable in all 4 extremities. On neurologic examination, cranial nerve function is " intact, motor tone is symmetrical, and the patient is alert, oriented and responsive.       Problems:  1. Obstructive sleep apnea syndrome  She presented with snoring, witnessed nocturnal apnea, excessive daytime somnolence and frequent headaches.  She has a crowded posterior pharyngeal airway and a body mass index of 33.  The polysomnogram is similar to the test that was done 2 years ago with an apnea hypopnea index of 7.6 events per hour rising to 23.5 events per hour in the supine body position and accompanied by a low saturation of 89%.  Her daytime fatigue and somnolence as well as her frequent headaches remain a major problem and I think treatment is necessary.  We talked about treatment options including airway pressurization, dental appliances, otolaryngologic surgery and behavioral measures including positional therapy and weight management.  I think that she would do well with CPAP together with ongoing efforts to achieve progressive weight loss.  We talked about a titration polysomnogram versus auto titrating CPAP.    2. Hypersomnolence  Probably related to her sleep disordered breathing.  She is spending sufficient of a time in bed and does not seem to have major issues with sleep hygiene.  Periodic limb movement arousal index is a bit elevated but may improve with treatment for sleep disordered breathing.  She has improved on treatment with modafinil and is not having side effects related to that medication.  She does not have symptoms suggesting narcolepsy.    3. Snoring    4. Restless leg syndrome    5. Periodic limb movement disorder (PLMD)  The periodic limb movement with arousal index is 4.1 events per hour.  This may improve with treatment for sleep disordered breathing.  Serum ferritin level will be requested to rule out iron deficiency.  We have talked about specific medical therapy for restless leg syndrome and periodic limb movement disorder and sleep but would like to see the effect of CPAP  treatment before considering that therapy with its potential side effects.    6. Mild intermittent asthma without complication  She is currently asymptomatic and not requiring medications.    7. Former smoker    8. BMI 33.0-33.9,adult  We have talked about the role of weight management in the treatment of sleep disordered breathing.  - Height And Weight      Plan:   1.  Mask fitting today.  She did best with a small air fit N30i mask.    2.  Initiate auto titrating CPAP with a pressure range of 5 to 12 cm water.  We talked about acclimating to the CPAP with proper mask fit, effective humidification and consistent use.    3.  Serum ferritin level.    4.  She may continue to use the modafinil at 200 mg a day in a single or divided dose.  We will reassess the use of the medication after her sleep disordered breathing has been effectively treated.    5.  Return visit here about 2 months after starting therapy, bringing the data recording chip with her.  She may drop into the technician clinic at any time for assistance.    We appreciate the opportunity to assist in her care.  Return in about 3 months (around 2/15/2020).

## 2019-11-21 ENCOUNTER — HOSPITAL ENCOUNTER (OUTPATIENT)
Dept: LAB | Facility: MEDICAL CENTER | Age: 39
End: 2019-11-21
Attending: ALLERGY & IMMUNOLOGY
Payer: COMMERCIAL

## 2019-11-21 ENCOUNTER — HOSPITAL ENCOUNTER (OUTPATIENT)
Dept: LAB | Facility: MEDICAL CENTER | Age: 39
End: 2019-11-21
Attending: INTERNAL MEDICINE
Payer: COMMERCIAL

## 2019-11-21 DIAGNOSIS — G25.81 RESTLESS LEG SYNDROME: ICD-10-CM

## 2019-11-21 DIAGNOSIS — G47.61 PERIODIC LIMB MOVEMENT DISORDER (PLMD): ICD-10-CM

## 2019-11-21 LAB — FERRITIN SERPL-MCNC: 16.7 NG/ML (ref 10–291)

## 2019-11-21 PROCEDURE — 82728 ASSAY OF FERRITIN: CPT

## 2019-11-21 PROCEDURE — 86256 FLUORESCENT ANTIBODY TITER: CPT

## 2019-11-21 PROCEDURE — 82784 ASSAY IGA/IGD/IGG/IGM EACH: CPT

## 2019-11-21 PROCEDURE — 36415 COLL VENOUS BLD VENIPUNCTURE: CPT

## 2019-11-21 PROCEDURE — 86003 ALLG SPEC IGE CRUDE XTRC EA: CPT | Mod: 91

## 2019-11-21 PROCEDURE — 83516 IMMUNOASSAY NONANTIBODY: CPT

## 2019-11-22 ENCOUNTER — PATIENT MESSAGE (OUTPATIENT)
Dept: NEUROLOGY | Facility: MEDICAL CENTER | Age: 39
End: 2019-11-22

## 2019-11-22 DIAGNOSIS — G43.111 INTRACTABLE MIGRAINE WITH AURA WITH STATUS MIGRAINOSUS: ICD-10-CM

## 2019-11-23 LAB
ENDOMYSIUM IGA TITR SER IF: NORMAL {TITER}
IGA SERPL-MCNC: 376 MG/DL (ref 68–408)
TTG IGA SER IA-ACNC: 1 U/ML (ref 0–3)

## 2019-11-24 LAB
COW MILK IGE QN: <0.1 KU/L
DEPRECATED MISC ALLERGEN IGE RAST QL: NORMAL
GLUTEN IGE QN: <0.1 KU/L
WHEAT IGE QN: <0.1 KU/L

## 2019-11-25 RX ORDER — KETOROLAC TROMETHAMINE 15.75 MG/1
1 SPRAY, METERED NASAL EVERY 6 HOURS PRN
Qty: 5 EACH | Refills: 11 | Status: SHIPPED | OUTPATIENT
Start: 2019-11-25 | End: 2019-12-19 | Stop reason: SDUPTHER

## 2019-12-17 ENCOUNTER — PATIENT MESSAGE (OUTPATIENT)
Dept: MEDICAL GROUP | Facility: MEDICAL CENTER | Age: 39
End: 2019-12-17

## 2019-12-19 ENCOUNTER — OFFICE VISIT (OUTPATIENT)
Dept: NEUROLOGY | Facility: MEDICAL CENTER | Age: 39
End: 2019-12-19
Payer: COMMERCIAL

## 2019-12-19 VITALS
WEIGHT: 183 LBS | SYSTOLIC BLOOD PRESSURE: 118 MMHG | DIASTOLIC BLOOD PRESSURE: 74 MMHG | HEART RATE: 84 BPM | HEIGHT: 61 IN | BODY MASS INDEX: 34.55 KG/M2 | OXYGEN SATURATION: 99 %

## 2019-12-19 DIAGNOSIS — G43.119 BASILAR ARTERY MIGRAINE, INTRACTABLE: Primary | ICD-10-CM

## 2019-12-19 PROCEDURE — 99213 OFFICE O/P EST LOW 20 MIN: CPT | Performed by: PSYCHIATRY & NEUROLOGY

## 2019-12-19 RX ORDER — KETOROLAC TROMETHAMINE 30 MG/ML
60 INJECTION, SOLUTION INTRAMUSCULAR; INTRAVENOUS ONCE
Status: COMPLETED | OUTPATIENT
Start: 2019-12-19 | End: 2019-12-19

## 2019-12-19 RX ORDER — KETOROLAC TROMETHAMINE 15.75 MG/1
1 SPRAY, METERED NASAL EVERY 6 HOURS PRN
Qty: 5 EACH | Refills: 11 | Status: SHIPPED | OUTPATIENT
Start: 2019-12-19 | End: 2020-10-09 | Stop reason: SINTOL

## 2019-12-19 RX ADMIN — KETOROLAC TROMETHAMINE 60 MG: 30 INJECTION, SOLUTION INTRAMUSCULAR; INTRAVENOUS at 10:00

## 2019-12-19 ASSESSMENT — ENCOUNTER SYMPTOMS
INSOMNIA: 1
DIZZINESS: 1
HEADACHES: 1
MEMORY LOSS: 0
VOMITING: 1
NAUSEA: 1

## 2019-12-19 NOTE — PROGRESS NOTES
"Subjective:      Bree Gage is a 39 y.o. adult who presents for follow-up, with a history of intractable basilar artery migraine.     HPI's    Since last seen, over the last 4 months, she has been on a steady dose of Emgality 120 mg injections every month, she actually has had consistent benefit.  The problem is the benefit last maybe 3 weeks, with the headaches then beginning to recur.  The incapacity does result towards the end of that fourth week until she injects again.  She tolerates the injections without issue.  This pattern has been maintained.  We have had great difficulty obtaining Sprix nasal spray, a nasal formulation of Toradol, a very effective rescue for her.  She has used a prednisone titration on an occasion, and though this helps, she has all the steroid-induced side effects to make her life more miserable.    During this time she has also begun to get effective treatment for her ROME, her mask is been fit, she is about to start actual use over the Christmas holidays.  She is hopeful that this will help improve her sleep hygiene, weight, overall energy level and, obviously, her headaches.    Medical, surgical and family histories are reviewed, there are no new drug allergies.  She is on her Emgality 120 mg injections monthly, Reglan 10 mg as needed, she just completed a prednisone titration, Effexor, Indocin 50 mg twice daily, the rest as per the electronic health record, noncontributory from my standpoint.    Review of Systems   Constitutional: Positive for malaise/fatigue.   Gastrointestinal: Positive for nausea and vomiting.   Neurological: Positive for dizziness and headaches.   Psychiatric/Behavioral: Negative for memory loss. The patient has insomnia.    All other systems reviewed and are negative.       Objective:     /74 (BP Location: Left arm, Patient Position: Sitting, BP Cuff Size: Adult)   Pulse 84   Ht 1.549 m (5' 1\")   Wt 83 kg (183 lb)   SpO2 99%   BMI 34.58 kg/m²  "     Physical Exam    She appears in some mild distress though she is complaining of rather significant headache pain.  There is notable photophobia.  She is still in good spirit and is cooperative.  Vital signs are stable.  There is no malar rash but there is some mild temporal tenderness.  Her neck is supple.  Cardiac evaluation is unremarkable.    In quick and cursory fashion, mental status, cranial nerve, musculoskeletal, coordination and since evaluations remain fully intact.     Assessment/Plan:     1. Basilar artery migraine, intractable  Things are improving slowly and steadily, she was told that she will need to push on continuing the Emgality since she is already failed Aimovig, I would like to hold onto changing to her last CGRP, Ajovy unless absolutely needed.  I am hopeful that treating the ROME will make a big difference, I suspect that it will.  We will try to help obtain the Sprix nasal spray, evidently it needs to go through a specialty pharmacy, the company is no longer shipping to local pharmacies.  The prior authorization was formalized through the office over a month ago.  We will follow-up in about 5 months.    - Ketorolac Tromethamine (SPRIX) 15.75 MG/SPRAY Solution; Spray 1 Spray in nose every 6 hours as needed.  Dispense: 5 Each; Refill: 11  - ketorolac (TORADOL) injection 60 mg    Time: 20-minute spent face-to-face for exam, review, discussion, and education, of this over 50% of the time spent counseling and coordinating care.

## 2020-01-29 ENCOUNTER — OFFICE VISIT (OUTPATIENT)
Dept: MEDICAL GROUP | Facility: MEDICAL CENTER | Age: 40
End: 2020-01-29
Payer: COMMERCIAL

## 2020-01-29 VITALS
SYSTOLIC BLOOD PRESSURE: 104 MMHG | DIASTOLIC BLOOD PRESSURE: 86 MMHG | TEMPERATURE: 98.1 F | HEART RATE: 90 BPM | WEIGHT: 182 LBS | BODY MASS INDEX: 34.36 KG/M2 | RESPIRATION RATE: 16 BRPM | HEIGHT: 61 IN | OXYGEN SATURATION: 97 %

## 2020-01-29 DIAGNOSIS — F33.1 MODERATE EPISODE OF RECURRENT MAJOR DEPRESSIVE DISORDER (HCC): ICD-10-CM

## 2020-01-29 DIAGNOSIS — G43.119 BASILAR ARTERY MIGRAINE, INTRACTABLE: ICD-10-CM

## 2020-01-29 PROCEDURE — 99214 OFFICE O/P EST MOD 30 MIN: CPT | Performed by: NURSE PRACTITIONER

## 2020-01-29 RX ORDER — KETOROLAC TROMETHAMINE 30 MG/ML
60 INJECTION, SOLUTION INTRAMUSCULAR; INTRAVENOUS ONCE
OUTPATIENT
Start: 2020-01-29 | End: 2020-01-30

## 2020-01-29 NOTE — PROGRESS NOTES
Subjective:     Chief Complaint   Patient presents with   • Other     fmla     Bree Gage is a 39 y.o. female here today to follow up on:    Moderate episode of recurrent major depressive disorder (HCC)  Reports that she is generally doing well on venlafaxine and Abilify.  No concerns with significant depression, insomnia, panic attack    Basilar artery migraine, intractable  Longstanding history of chronic intractable migraine which is followed by neurology.  She presents today for completion of her FMLA paperwork for intermittent leave.  She does report that her migraine frequency is starting to decrease with Emgality.  She continues with Fioricet, Toradol, Reglan for acute episodes.  Many times she is able to work through episodes.  However, she is still missing about 3 to 4 days a month when having more severe symptoms.  She reports that she is currently having an acute migraine which started yesterday.  She would like a dose of Toradol in the office today which has worked well for her in the past.  No numbness, tingling, weakness in extremities, vision changes, current nausea or vomiting    Current medicines (including changes today)  Current Outpatient Medications   Medication Sig Dispense Refill   • Ketorolac Tromethamine (SPRIX) 15.75 MG/SPRAY Solution Spray 1 Spray in nose every 6 hours as needed. 5 Each 11   • predniSONE (DELTASONE) 10 MG Tab 6 tab daily for 2 days, then reduce by 1 tab every other day until off 45 Tab 0   • metoclopramide (REGLAN) 10 MG Tab TAKE 1 TO 3 TABS BY MOUTH AT ONSET OF HEADACHE/NAUSEA.REPEAT IN 4 TO 6 HOURS AS NEEDED 45 Tab 0   • ondansetron (ZOFRAN) 4 MG Tab tablet 1-2 tab every 4-6 hours prn for nausea 30 Tab 3   • promethazine (PHENERGAN) 25 MG Tab Take 1 Tab by mouth every 6 hours as needed for Nausea/Vomiting. 30 Tab 6   • omeprazole (PRILOSEC) 20 MG delayed-release capsule Take 20 mg by mouth every day.     • aripiprazole (ABILIFY) 5 MG tablet TAKE 1/2 TABLET BY  MOUTH DAILY 45 Tab 2   • Galcanezumab-gnlm (EMGALITY) 120 MG/ML Solution Auto-injector Inject 1 PEN as instructed Q 4 Weeks. 1 PEN 5   • Diclofenac Sodium 1 % Gel Apply 2-4 g to skin as directed 4 times a day as needed. 1 Tube 2   • venlafaxine XR (EFFEXOR XR) 75 MG CAPSULE SR 24 HR Take 1 Cap by mouth every day. 90 Cap 3   • Methylsulfonylmethane (MSM PO) Take  by mouth.     • indomethacin (INDOCIN) 50 MG Cap Take 50 mg by mouth 2 Times a Day.     • Lactobacillus Acidophilus Powder Take  by mouth.     • montelukast (SINGULAIR) 10 MG Tab Take 4 mg by mouth.     • Cetirizine HCl 10 MG Cap Take 10 mg by mouth.     • azelastine (ASTELIN) 137 MCG/SPRAY nasal spray      • QVAR 80 MCG/ACT inhaler      • ACETAMINOPHEN-CAFF-BUTALBITAL -40 MG Cap      • acetaminophen (TYLENOL) 500 MG TABS Take 500-1,000 mg by mouth every 6 hours as needed. Up to 8 a day     • MAGNESIUM PO Take  by mouth.       Current Facility-Administered Medications   Medication Dose Route Frequency Provider Last Rate Last Dose   • ketorolac (TORADOL) injection 60 mg  60 mg Intramuscular Once Jacque Song [email or web address].         She  has a past medical history of Alcohol addiction ([facility]), Allergy, Angina (last 2 years), Apnea, sleep, Arthritis (current), Back pain, Bronchitis, Chickenpox, Constipation, Daytime sleepiness, Depression, Dizziness, Drug addiction ([facility]), EBV positive mononucleosis syndrome, Eye pain, Fatigue, Fever, Fracture of finger, Frequent headaches, Gasping for breath, GERD (gastroesophageal reflux disease), Hemorrhoids, Hyperlipidemia, Hypothyroidism, IBS (irritable bowel syndrome), Indigestion, Influenza, Iron deficiency anemia, LBP (low back pain), Morning headache, Painful joint, Rectal fissure, Snoring, Sore muscles, Varicose veins, and Wears glasses. She also has no past medical history of Arrhythmia, Cancer ([facility]), CATARACT, Congestive heart failure ([facility]), COPD, Diabetes, Dialysis, Glaucoma, Heart murmur, Heart valve disease,  "Hypertension, Jaundice, Myocardial infarct (HCC), Other specified symptom associated with female genital organs, Pacemaker, Personal history of venous thrombosis and embolism, Pneumonia, Renal disorder, Rheumatic fever, Seizure (HCC), Stroke (HCC), Unspecified hemorrhagic conditions, or Unspecified urinary incontinence.    ROS included above     Objective:     /86 (BP Location: Left arm, Patient Position: Sitting, BP Cuff Size: Adult)   Pulse 90   Temp 36.7 °C (98.1 °F) (Temporal)   Resp 16   Ht 1.549 m (5' 1\")   Wt 82.6 kg (182 lb)   SpO2 97%  Body mass index is 34.39 kg/m².     Physical Exam:  General: Alert, oriented in no acute distress.  Eye contact is good, speech is normal, affect calm  Lungs: clear to auscultation bilaterally, normal effort, no wheeze/ rhonchi/ rales.  CV: regular rate and rhythm, S1, S2, no murmur  Ext: no edema, color normal, vascularity normal, temperature normal    Assessment and Plan:   The following treatment plan was discussed   1. Basilar artery migraine, intractable   chronic issue followed by neurology.  FMLA forms completed today, scanned into media.  Overall she does feel that her migraine pattern is improving with Emgality.  However, she is experiencing an acute episode which started yesterday, we will give her a dose of Toradol in the office today which has worked well for her in the past.  ketorolac (TORADOL) injection 60 mg   2. Moderate episode of recurrent major depressive disorder (HCC)   stable       Followup: as needed         Please note that this dictation was created using voice recognition software. I have worked with consultants from the vendor as well as technical experts from [email or web address] to optimize the interface. I have made every reasonable attempt to correct obvious errors, but I expect that there are errors of grammar and possibly content that I did not discover before finalizing the note.       "

## 2020-01-29 NOTE — ASSESSMENT & PLAN NOTE
Longstanding history of chronic intractable migraine which is followed by neurology.  She presents today for completion of her [facility] paperwork for intermittent leave.  She does report that her migraine frequency is starting to decrease with Emgality.  She continues with Fioricet, Toradol, Reglan for acute episodes.  Many times she is able to work through episodes.  However, she is still missing about 3 to 4 days a month when having more severe symptoms.  She reports that she is currently having an acute migraine which started yesterday.  She would like a dose of Toradol in the office today which has worked well for her in the past.

## 2020-01-29 NOTE — ASSESSMENT & PLAN NOTE
Reports that she is generally doing well on venlafaxine and Abilify.  No concerns with significant depression, insomnia, panic attack

## 2020-02-12 ENCOUNTER — PATIENT MESSAGE (OUTPATIENT)
Dept: MEDICAL GROUP | Facility: MEDICAL CENTER | Age: 40
End: 2020-02-12

## 2020-02-13 ENCOUNTER — PATIENT MESSAGE (OUTPATIENT)
Dept: MEDICAL GROUP | Facility: MEDICAL CENTER | Age: 40
End: 2020-02-13

## 2020-02-13 NOTE — TELEPHONE ENCOUNTER
"From: Bree Gage  To: XUAN Ko  Sent: 2/12/2020 9:54 AM PST  Subject: Non-Urgent Medical Question    I met with Amanda Krishnamurthy yesterday, my GYN for urinary incontinence and irregular/frequent menses (Q2-3 weeks with my 3rd Nexplanon in place) - we have been having a hard time finding the \"right\" birth control because my migraines are so frequent and irregular. She suggested a progesterone only oral contraception or an IUD :(  She thinks I could have pelvic floor dysfunction and we have been toying with the idea that my endometriosis is getting worse. I didn't realize my chronic constipation could be related.  She wants me to consult with Dr Hopkins ([facility] in [facility]) to discuss either PT or possible surgery in the future, or I can choose the IUD (which I had a very bad experience with in the past)  Are there any ideas you might have?  "

## 2020-02-13 NOTE — TELEPHONE ENCOUNTER
"From: Bree Gage  To: XUAN Ko  Sent: 2/13/2020 9:02 AM PST  Subject: Non-Urgent Medical Question    The IUD was put in 2011 by [facility] and it was so painful in the office during insertion that I passed out and I could feel a tightness and pressure for the 2 years I had it in. When it was removed and nexplanon implanted, the pressure went away.  Amanda states she will lidocaine my cervix before insertion (and it would be Mirena she would order) and she thinks it will be better for the endometriosis and the frequent periods.  I will still meet with Dr Hopkins next month and am working on my kegels!      ----- Message -----   From:XUAN Ko   Sent:2/13/2020 8:26 AM PST   To:Bree Gage   Subject:RE: Non-Urgent Medical Question    Nate Giron,  Physical therapy could be helpful for you, I will be interested to hear what Dr. Hopkins says.  What happened when you had the IUD? I have a lot of patients that are really happy with them, I find that the Mirena works really well.  Gillian CHEEMA      ----- Message -----   From:Bree Gage   Sent:2/12/2020 9:54 AM PST   To:XUAN Ko   Subject:Non-Urgent Medical Question    I met with Amanda Krishnamurthy yesterday, my GYN for urinary incontinence and irregular/frequent menses (Q2-3 weeks with my 3rd Nexplanon in place) - we have been having a hard time finding the \"right\" birth control because my migraines are so frequent and irregular. She suggested a progesterone only oral contraception or an IUD :(  She thinks I could have pelvic floor dysfunction and we have been toying with the idea that my endometriosis is getting worse. I didn't realize my chronic constipation could be related.  She wants me to consult with Dr Hopkins ([facility] in [facility]) to discuss either PT or possible surgery in the future, or I can choose the IUD (which I had a very bad experience with in the past)  Are there any ideas you might have?  "

## 2020-02-14 DIAGNOSIS — G47.10 HYPERSOMNOLENCE: ICD-10-CM

## 2020-02-14 RX ORDER — MODAFINIL 200 MG/1
200 TABLET ORAL DAILY
Qty: 30 TAB | Refills: 1 | Status: SHIPPED | OUTPATIENT
Start: 2020-02-14 | End: 2020-04-13 | Stop reason: SDUPTHER

## 2020-02-14 NOTE — TELEPHONE ENCOUNTER
Have we ever prescribed this med? Yes.  If yes, what date? 11/07/2019    Last OV: 11/15/2019 - Dr. Irvin    Next OV: 03/05/2020 - Dr. Irvin     DX: Hypersomnia     Medications: Modafinil

## 2020-02-18 RX ORDER — PREDNISONE 10 MG/1
TABLET ORAL
Qty: 45 TAB | Refills: 0 | Status: SHIPPED | OUTPATIENT
Start: 2020-02-18 | End: 2020-10-09

## 2020-02-18 NOTE — TELEPHONE ENCOUNTER
Received request via: Pharmacy    Was the patient seen in the last year in this department? Yes    Does the patient have an active prescription (recently filled or refills available) for medication(s) requested? No     Patient was last seen on 12/19/2019    Medication was last prescribed on 10/24/2019

## 2020-02-25 ENCOUNTER — TELEPHONE (OUTPATIENT)
Dept: NEUROLOGY | Facility: MEDICAL CENTER | Age: 40
End: 2020-02-25

## 2020-02-25 NOTE — TELEPHONE ENCOUNTER
Prior Authorization was submitted through [email or web address], pending approval. Will await upon approval .

## 2020-02-28 NOTE — TELEPHONE ENCOUNTER
Received an approval through [employer] for emgaility. Scanned into  for approval letter through [employer]. Patient's medication is approved until 02/26/2021

## 2020-03-04 ENCOUNTER — HOSPITAL ENCOUNTER (OUTPATIENT)
Dept: RADIOLOGY | Facility: MEDICAL CENTER | Age: 40
End: 2020-03-04
Payer: COMMERCIAL

## 2020-03-05 ENCOUNTER — SLEEP CENTER VISIT (OUTPATIENT)
Dept: SLEEP MEDICINE | Facility: MEDICAL CENTER | Age: 40
End: 2020-03-05
Payer: COMMERCIAL

## 2020-03-05 ENCOUNTER — PATIENT MESSAGE (OUTPATIENT)
Dept: MEDICAL GROUP | Facility: MEDICAL CENTER | Age: 40
End: 2020-03-05

## 2020-03-05 VITALS
RESPIRATION RATE: 14 BRPM | OXYGEN SATURATION: 96 % | DIASTOLIC BLOOD PRESSURE: 62 MMHG | HEIGHT: 61 IN | BODY MASS INDEX: 34.36 KG/M2 | WEIGHT: 182 LBS | SYSTOLIC BLOOD PRESSURE: 100 MMHG | HEART RATE: 100 BPM

## 2020-03-05 DIAGNOSIS — Z87.891 FORMER SMOKER: ICD-10-CM

## 2020-03-05 DIAGNOSIS — R06.83 SNORING: ICD-10-CM

## 2020-03-05 DIAGNOSIS — G89.29 CHRONIC NECK PAIN: ICD-10-CM

## 2020-03-05 DIAGNOSIS — J45.20 MILD INTERMITTENT ASTHMA WITHOUT COMPLICATION: ICD-10-CM

## 2020-03-05 DIAGNOSIS — G47.10 HYPERSOMNOLENCE: ICD-10-CM

## 2020-03-05 DIAGNOSIS — M54.2 CHRONIC NECK PAIN: ICD-10-CM

## 2020-03-05 DIAGNOSIS — G47.33 OBSTRUCTIVE SLEEP APNEA SYNDROME: ICD-10-CM

## 2020-03-05 DIAGNOSIS — G25.81 RESTLESS LEG SYNDROME: ICD-10-CM

## 2020-03-05 DIAGNOSIS — G47.61 PERIODIC LIMB MOVEMENT DISORDER (PLMD): ICD-10-CM

## 2020-03-05 PROCEDURE — 99214 OFFICE O/P EST MOD 30 MIN: CPT | Performed by: INTERNAL MEDICINE

## 2020-03-05 ASSESSMENT — FIBROSIS 4 INDEX: FIB4 SCORE: 0.73

## 2020-03-05 NOTE — PROGRESS NOTES
CC: Sleep apnea hypopnea syndrome.    HPI:   Ms. Gage is a 38-year-old woman referred by ANETTE Palafox, to assist in the evaluation and management of suspected sleep disordered breathing.  He was initially evaluated on August 30, 2019 and last seen on November 15, 2019.    She has a complicated medical history involving fibromyalgia and chronic pain.  She had an episode in 2009 of pneumonia and respiratory failure with prolonged hospitalization requiring tracheostomy and complicated by acute renal failure.     She dates the onset of her daytime fatigue and somnolence to about age 14.  She presented almost 2 years ago to Dr. Ramon with snoring and daytime somnolence.  She had an [place] sleepiness score of 15 points.  Polysomnography on October 19, 2017 suggested an apnea hypopnea index of 7.2 events per hour with a respiratory disturbance index of 7.3 events per hour and a lowest arterial oxygen saturation of 91% on room air.  In that study she spent fewer than 6 minutes in REM sleep but the study was done mostly in the supine body position.  On the following day she underwent a multiple sleep latency study that demonstrated a mean sleep onset latency of 9 minutes and 26 seconds with no sleep onset REM periods.  She was treated for a time with Provigil which seemed to improve her daytime alertness.     She has a regular sleep schedule, as indicated by the sleep diary, with bedtime at about 8 PM and she falls asleep quite quickly.  She does not typically awaken at night.  She arises between 4 and 5:30 AM feeling tired and groggy with frequent morning headaches.  The spousal questionnaire indicates loud snoring and pauses in breathing as well as twitching of the legs or feet during sleep and bruxism.  She is tired during the day and may doze off during quiet moments and naps when the opportunity presents.  Her [place] sleepiness score is 10 points.  She drinks caffeinated beverages moderately and sometimes uses  over-the-counter antihistamines to facilitate sleep onset but she otherwise does not use substances to induce sleep or to maintain wakefulness.  She does use Fioricet occasionally for migraine headaches and methocarbamol for back pain.  She does not have symptoms suggesting narcolepsy and specifically does not have a history suggesting cataplexy, logical hallucinations, sleep paralysis or irresistible sleep episodes.     Her medical history includes a fibromyalgia as well as migraine, irritable bowel syndrome and depression.  She is not using narcotic analgesics.  In addition to inhalation allergies she has a history of asthma and has been on inhaled beclomethasone and albuterol in the past but has not required those medications in recent years.  She does use Singulair 10 mg a day as well as over-the-counter antihistamines and Astelin.     She continues to use modafinil at 100 to 200 mg a day which has markedly improved her level of daytime alertness.  She has not noticed any increase in headache frequency with the modafinil and not had side effects such as agitation, nausea, palpitations or trouble sleeping.     The polysomnogram dated November 3, 2019 demonstrated some fragmentation of sleep with increased wake after sleep onset time but 76 minutes of REM sleep, included.  There are frequent periodic limb movements in the periodic limb movement with arousal index is 4.1 events per hour.  The apnea hypopnea index is 7.6 events per hour including hypopnea episodes with just 2 episodes of central apnea.  The index rises to 23.5 events per hour in supine sleep.  The lowest arterial oxygen saturation is 89% on room air.    After discussion she was started on auto titrating CPAP at 5 to 12 cm of water pressure.  She has acclimated well to treatment but is having some mask issues using a DreamWear nasal mask that causes some nostril irritation.  She is disappointed that she is not noticed a significant increase in  daytime alertness, reduced need for modafinil, or reduction in the frequency and severity of her headaches.    The CPAP data recording chip information is reviewed with the patient.  It demonstrates use on 29 of the last 30 days with an average daily usage of 5 hours and 51 minutes.  The average pressure is 7.8 cm of water with a maximum of 10.4.  The estimated residual apnea hypopnea index is 0.6 events per hour and leak is moderate.        Patient Active Problem List    Diagnosis Date Noted   • Obesity (BMI 30-39.9) 08/30/2019   • History of fracture of right ankle 06/13/2019   • Numbness of right hand 04/17/2019   • Elevated glucose 06/22/2018   • Obesity, Class I, BMI 30.0-34.9 (see actual BMI) 05/23/2018   • Moderate episode of recurrent major depressive disorder (HCC) 05/23/2018   • Asthma in adult without complication 05/23/2018   • Basilar artery migraine, intractable 10/11/2017   • Chronic migraine 10/11/2017   • EEG abnormal 10/11/2017   • Hyperprolactinemia (HCC) 02/13/2013   • Fatigue 09/03/2009   • Preventative health care 09/03/2009   • IBS (irritable bowel syndrome)    • History of alcohol abuse    • History of methamphetamine abuse ([facility])    • History of hypothyroidism    • Hemorrhoids    • Rectal fissure    • Varicose veins    • Iron deficiency anemia    • Hyperlipidemia    • Low back pain    • EBV positive mononucleosis syndrome        Past Medical History:   Diagnosis Date   • Alcohol addiction ([facility])    • Allergy    • Angina last 2 years    pt attributes to asthma   • Apnea, sleep    • Arthritis current    osteoarthritis   • Back pain    • Bronchitis     4-5x/year   • Chickenpox    • Constipation    • Daytime sleepiness    • Depression    • Dizziness    • Drug addiction ([facility])    • EBV positive mononucleosis syndrome    • Eye pain    • Fatigue    • Fever    • Fracture of finger     rt index x 3   • Frequent headaches    • Gasping for breath    • GERD (gastroesophageal reflux disease)    •  Hemorrhoids    • Hyperlipidemia    • Hypothyroidism    • IBS (irritable bowel syndrome)    • Indigestion    • Influenza    • Iron deficiency anemia    • LBP (low back pain)     si joint epidural    • Morning headache    • Painful joint    • Rectal fissure    • Snoring    • Sore muscles    • Varicose veins    • Wears glasses        Past Surgical History:   Procedure Laterality Date   • TRACHEOSTOMY  10/29/2009    Performed by NIDHI LARA at SURGERY [facility]   • GASTROSTOMY LAPAROSCOPIC  10/29/2009    Performed by NIDHI LARA at SURGERY [facility] ORS       Family History   Problem Relation Age of Onset   • Hypertension Mother    • Hyperlipidemia Mother    • Alcohol/Drug Mother    • Psychiatric Illness Mother    • Hypertension Father    • Hyperlipidemia Father    • Alcohol/Drug Father    • Psychiatric Illness Father    • Cancer Maternal Grandfather    • Heart Disease Maternal Grandfather    • Heart Disease Paternal Grandfather    • Alcohol/Drug Paternal Grandfather        Social History     Socioeconomic History   • Marital status:      Spouse name: Not on file   • Number of children: Not on file   • Years of education: Not on file   • Highest education level: Not on file   Occupational History   • Not on file   Social Needs   • Financial resource strain: Not on file   • Food insecurity     Worry: Not on file     Inability: Not on file   • Transportation needs     Medical: Not on file     Non-medical: Not on file   Tobacco Use   • Smoking status: Former Smoker     Packs/day: 0.50     Years: 19.00     Pack years: 9.50     Types: Cigarettes     Start date: 1998     Last attempt to quit: 8/15/2015     Years since quittin.5   • Smokeless tobacco: Never Used   • Tobacco comment: Quit 2015   Substance and Sexual Activity   • Alcohol use: Yes     Alcohol/week: 0.0 oz     Frequency: 2-3 times a week     Drinks per session: 1 or 2     Binge frequency: Never     Comment: one or two a  week   • Drug use: Yes     Types: Methamphetamines     Comment: clean since 11/02  Rehab 11/02, 5/09 speed   • Sexual activity: Yes     Partners: Male     Birth control/protection: Pill   Lifestyle   • Physical activity     Days per week: Not on file     Minutes per session: Not on file   • Stress: Not on file   Relationships   • Social connections     Talks on phone: Not on file     Gets together: Not on file     Attends Jainism service: Not on file     Active member of club or organization: Not on file     Attends meetings of clubs or organizations: Not on file     Relationship status: Not on file   • Intimate partner violence     Fear of current or ex partner: Not on file     Emotionally abused: Not on file     Physically abused: Not on file     Forced sexual activity: Not on file   Other Topics Concern   • Not on file   Social History Narrative   • Not on file       Current Outpatient Medications   Medication Sig Dispense Refill   • predniSONE (DELTASONE) 10 MG Tab 6 TAB DAILY FOR 2 DAYS, THEN REDUCE BY 1 TAB EVERY OTHER DAY UNTIL GONE 45 Tab 0   • modafinil (PROVIGIL) 200 MG Tab Take 1 Tab by mouth every day for 60 days. 30 Tab 1   • Ketorolac Tromethamine (SPRIX) 15.75 MG/SPRAY Solution Spray 1 Spray in nose every 6 hours as needed. 5 Each 11   • metoclopramide (REGLAN) 10 MG Tab TAKE 1 TO 3 TABS BY MOUTH AT ONSET OF HEADACHE/NAUSEA.REPEAT IN 4 TO 6 HOURS AS NEEDED 45 Tab 0   • ondansetron (ZOFRAN) 4 MG Tab tablet 1-2 tab every 4-6 hours prn for nausea 30 Tab 3   • promethazine (PHENERGAN) 25 MG Tab Take 1 Tab by mouth every 6 hours as needed for Nausea/Vomiting. 30 Tab 6   • omeprazole (PRILOSEC) 20 MG delayed-release capsule Take 20 mg by mouth every day.     • aripiprazole (ABILIFY) 5 MG tablet TAKE 1/2 TABLET BY MOUTH DAILY 45 Tab 2   • Galcanezumab-gnlm (EMGALITY) 120 MG/ML Solution Auto-injector Inject 1 PEN as instructed Q 4 Weeks. 1 PEN 5   • Diclofenac Sodium 1 % Gel Apply 2-4 g to skin as directed  "4 times a day as needed. 1 Tube 2   • venlafaxine XR (EFFEXOR XR) 75 MG CAPSULE SR 24 HR Take 1 Cap by mouth every day. 90 Cap 3   • Methylsulfonylmethane (MSM PO) Take  by mouth.     • indomethacin (INDOCIN) 50 MG Cap Take 50 mg by mouth 2 Times a Day.     • Lactobacillus Acidophilus Powder Take  by mouth.     • montelukast (SINGULAIR) 10 MG Tab Take 4 mg by mouth.     • Cetirizine HCl 10 MG Cap Take 10 mg by mouth.     • azelastine (ASTELIN) 137 MCG/SPRAY nasal spray      • QVAR 80 MCG/ACT inhaler      • ACETAMINOPHEN-CAFF-BUTALBITAL -40 MG Cap      • MAGNESIUM PO Take  by mouth.     • acetaminophen (TYLENOL) 500 MG TABS Take 500-1,000 mg by mouth every 6 hours as needed. Up to 8 a day       No current facility-administered medications for this visit.     \"CURRENT RX\"      Allergies: Ciprofloxacin; Compazine; Diclofenac; Epinephrine; and Latex      ROS  Unchanged from prior and positive for the sleep, respiratory, allergy, GI and chronic pain issues reviewed above.  She wears corrective lenses but has no diplopia.  She denies tinnitus or loss of auditory acuity, chest pain or palpitations, unusual cough or dyspnea.  She does have a regular menses and the migraine headaches noted above.  All other aspects of the CPT review of systems process are negative.      Physical Exam:   /62 (BP Location: Left arm, Patient Position: Sitting, BP Cuff Size: Adult)   Pulse 100   Resp 14   Ht 1.549 m (5' 1\")   Wt 82.6 kg (182 lb)   SpO2 96%   BMI 34.39 kg/m²    Head and neck examination demonstrates no mucosal lesion, purulent drainage or evident polyps. The pharynx is benign with a Mallampati III presentation. The neck is supple without thyromegaly. On chest examination there are symmetrical bilateral breath sounds without rales, wheezing or consolidation. On cardiac examination, the apical impulse and heart sounds are normal and the rhythm is regular. There is no murmur, gallop or rub and no jugular venous " distention. The abdomen is soft with active bowel sounds and no palpable hepatosplenomegaly, mass, guarding or rebound. The extremities show no clubbing, cyanosis or edema and no signs of deep venous thrombosis. There is no warmth, redness, tenderness or palpable venous cord in the calves. The skin is clear, warm and dry. There is no unusual peripheral lymphadenopathy. Peripheral pulses are palpable in all 4 extremities. On neurologic examination, cranial nerve function is intact, motor tone is symmetrical, and the patient is alert, oriented and responsive.       Problems:  1. Obstructive sleep apnea syndrome  She has at least mild obstructive sleep apnea hypopnea with an apnea hypopnea index of 7.6 events per hour, rising to 23.5 in the supine body position and accompanied by a lowest arterial oxygen saturation of 89%.  With her significant daytime somnolence and frequent migraine type headaches effective treatment is required.  She has acclimated to the CPAP but is having some mask issues.  The data recorder confirms her history of a bit less than 6 hours of average sleep on CPAP nightly.  The AutoSet pressures seem appropriate and the residual apnea hypopnea index is low at 0.6 events per hour.  Unfortunately she is not noticed a significant improvement in her daytime somnolence or headaches.    2. Hypersomnolence  Related at least in part to the sleep disordered breathing.  Her sleep may also be fragmented by chronic pain.    3. Snoring    4. Restless leg syndrome    5. Periodic limb movement disorder (PLMD)  The periodic limb movement with arousal index is 4.1 events per hour.  This may improve with treatment for sleep disordered breathing.  A serum ferritin level will was normal in November 2019. We have talked about specific medical therapy for restless leg syndrome and periodic limb movement disorder and sleep but would like to see the effect of all night CPAP treatment before considering that therapy with  its potential side effects.    6. Mild intermittent asthma without complication  She is currently asymptomatic and not requiring medications.    7. Former smoker    8. BMI 34.0-34.9,adult      Plan:   1.  Mask refitting today.  She again did best with a small air fit N30i mask rather than the current DreamWear nasal mask.    2.  Continue auto titrating CPAP at 5 to 12 cm water pressure.  We talked again about the goal of 7.5 hours on CPAP daily.    3.  Continue modafinil at up to 200 mg a day.     4.  Follow-up here in 3 months, or sooner if new problems develop.  She may drop into the technician clinic at any time for assistance.    We appreciate the opportunity to assist in her care.      Return in about 3 months (around 6/5/2020).

## 2020-03-26 ENCOUNTER — PATIENT MESSAGE (OUTPATIENT)
Dept: MEDICAL GROUP | Facility: MEDICAL CENTER | Age: 40
End: 2020-03-26

## 2020-03-30 ENCOUNTER — PATIENT MESSAGE (OUTPATIENT)
Dept: MEDICAL GROUP | Facility: MEDICAL CENTER | Age: 40
End: 2020-03-30

## 2020-03-30 NOTE — TELEPHONE ENCOUNTER
From: Bree Gage  To: XUAN Ko  Sent: 3/30/2020 11:33 AM PDT  Subject: Non-Urgent Medical Question    Dr Negron actually called and spoke to me about the experience I had in the office last week and I am impressed!  I will go back and talk to him about my ankle as know I have quite a few questions before just going into surgery.    I hope you are staying safe!!      ----- Message -----    From:XUAN Ko   Sent:3/29/2020 12:40 PM PDT   To:Bree Gage   Subject:RE: Non-Urgent Medical Question    Nate Giron,  I'm sorry, it sounds like you were not very happy with the visit. I have not yet received any notes. Who did you see?   I really do not know any ankle specialists to recommend by name. I can ask some of my colleagues.  Gillian CHEEMA      ----- Message -----   From:Bree Gage   Sent:3/26/2020 1:36 PM PDT   To:XUAN Ko   Subject:Non-Urgent Medical Question    I met with RICK yesterday regarding my right ankle. After xrays, they want an MRI as they believe it could be a tear of the syndesmosis or an injury from a fracture in 2011 that was only casted and I never did PT or any other testing. I'm concerned as it was a very blunt visit and she told me to do an MRI - then schedule surgery. That was it. Then put on a brace that doesn't fit. With the hospital restrictions, I do not want to even think about a surgery (it would be my first elective surgery) and I will do the MRI until later this year. Wanted you to know as you(hopefully) receive a report. I'm also considering a second opinion and wanted to check in with you if you have any Ankle Specialists you recommend. Thank you & please be safe!

## 2020-04-06 ENCOUNTER — PATIENT MESSAGE (OUTPATIENT)
Dept: MEDICAL GROUP | Facility: MEDICAL CENTER | Age: 40
End: 2020-04-06

## 2020-04-06 RX ORDER — BENZONATATE 100 MG/1
100 CAPSULE ORAL 3 TIMES DAILY PRN
Qty: 60 CAP | Refills: 0 | Status: SHIPPED | OUTPATIENT
Start: 2020-04-06 | End: 2020-10-19

## 2020-04-07 DIAGNOSIS — G43.111 INTRACTABLE MIGRAINE WITH AURA WITH STATUS MIGRAINOSUS: ICD-10-CM

## 2020-04-07 RX ORDER — GALCANEZUMAB 120 MG/ML
1 INJECTION, SOLUTION SUBCUTANEOUS
Qty: 1 PEN | Refills: 5 | Status: SHIPPED | OUTPATIENT
Start: 2020-04-07 | End: 2020-10-19

## 2020-04-13 DIAGNOSIS — G47.10 HYPERSOMNOLENCE: ICD-10-CM

## 2020-04-13 RX ORDER — MODAFINIL 200 MG/1
200 TABLET ORAL DAILY
Qty: 30 TAB | Refills: 1 | Status: SHIPPED
Start: 2020-04-13 | End: 2020-06-12

## 2020-04-17 ENCOUNTER — OFFICE VISIT (OUTPATIENT)
Dept: NEUROLOGY | Facility: MEDICAL CENTER | Age: 40
End: 2020-04-17
Payer: COMMERCIAL

## 2020-04-17 ENCOUNTER — OFFICE VISIT (OUTPATIENT)
Dept: PHYSICAL MEDICINE AND REHAB | Facility: MEDICAL CENTER | Age: 40
End: 2020-04-17
Payer: COMMERCIAL

## 2020-04-17 VITALS
TEMPERATURE: 98.4 F | WEIGHT: 174.82 LBS | BODY MASS INDEX: 33.01 KG/M2 | OXYGEN SATURATION: 98 % | DIASTOLIC BLOOD PRESSURE: 78 MMHG | SYSTOLIC BLOOD PRESSURE: 122 MMHG | HEART RATE: 94 BPM | HEIGHT: 61 IN

## 2020-04-17 VITALS
OXYGEN SATURATION: 96 % | WEIGHT: 176 LBS | SYSTOLIC BLOOD PRESSURE: 108 MMHG | DIASTOLIC BLOOD PRESSURE: 70 MMHG | HEART RATE: 84 BPM | HEIGHT: 61 IN | BODY MASS INDEX: 33.23 KG/M2

## 2020-04-17 DIAGNOSIS — M54.2 CERVICALGIA: ICD-10-CM

## 2020-04-17 DIAGNOSIS — M79.10 MYALGIA: ICD-10-CM

## 2020-04-17 DIAGNOSIS — G43.119 BASILAR ARTERY MIGRAINE, INTRACTABLE: Primary | ICD-10-CM

## 2020-04-17 PROCEDURE — 99213 OFFICE O/P EST LOW 20 MIN: CPT | Performed by: PSYCHIATRY & NEUROLOGY

## 2020-04-17 PROCEDURE — 99204 OFFICE O/P NEW MOD 45 MIN: CPT | Performed by: PHYSICAL MEDICINE & REHABILITATION

## 2020-04-17 RX ORDER — BACLOFEN 5 MG/1
1 TABLET ORAL 3 TIMES DAILY PRN
Qty: 90 TAB | Refills: 0 | Status: SHIPPED | OUTPATIENT
Start: 2020-04-17 | End: 2020-05-17

## 2020-04-17 RX ORDER — FLUTICASONE PROPIONATE 50 MCG
1 SPRAY, SUSPENSION (ML) NASAL
COMMUNITY
End: 2021-01-19

## 2020-04-17 RX ORDER — HYDROXYZINE HYDROCHLORIDE 25 MG/1
25 TABLET, FILM COATED ORAL
COMMUNITY
End: 2021-02-12

## 2020-04-17 RX ORDER — METHOCARBAMOL 500 MG/1
500 TABLET, FILM COATED ORAL 3 TIMES DAILY PRN
COMMUNITY
Start: 2020-03-10 | End: 2020-10-09

## 2020-04-17 ASSESSMENT — FIBROSIS 4 INDEX
FIB4 SCORE: 0.73
FIB4 SCORE: 0.73

## 2020-04-17 ASSESSMENT — ENCOUNTER SYMPTOMS: HEADACHES: 1

## 2020-04-17 ASSESSMENT — PAIN SCALES - GENERAL: PAINLEVEL: 6=MODERATE PAIN

## 2020-04-17 ASSESSMENT — PATIENT HEALTH QUESTIONNAIRE - PHQ9
CLINICAL INTERPRETATION OF PHQ2 SCORE: 2
SUM OF ALL RESPONSES TO PHQ QUESTIONS 1-9: 6
5. POOR APPETITE OR OVEREATING: 1 - SEVERAL DAYS

## 2020-04-17 NOTE — PROGRESS NOTES
New patient note    Physiatry (physical medicine and  Rehabilitation), interventional spine and sports medicine    Date of Service: 04/17/2020    Chief complaint:   Chief Complaint   Patient presents with   • New Patient     Neck pain       HISTORY    HPI: Bree Gage 39 y.o. female who presents today for evaluation of neck and right arm pain.  She reports that she has had ongoing pain that has been going on since 2017.  There was no particular injury associated with this pain, although it seems to have started after a dental procedure and she thinks that she had a strain in her neck after this.    She has tried multiple treatments thusfar with treatments at [facility] with Pradip Johnson MD.  Treatments have included cervical epidurals, trigger point injections, topical agents, magnesium, botox (reports she had anaphylaxis), naltrexone, cymbalta(caused suicidality), savella, lyrica(gained 50 lb), gabapentin.  These did not help with her pain.  From her report, she has also worked with pain psychology and used CBT techniques, all without significant change in her pain complaints.  She reports that her pain is a 6/10 on the VAS    Her neck pain is aching in the head, spine and right shoulder and posterior shoulder girdle.  She also has tingling into the right arm with aching in her right wrist with some burning pain in her hand at times.  Sitting and standing fields not change her symptoms.  Coughing and sneezing do not worsen her symptoms.    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  She has been seen by Neurologist, Dr. Amos, for this.  Her exercise program is limited to walking, although this can make her pain worse.  She does not have a regular exercise/stretching routine.  She reports that she avoids gluten, dairy and sugar.  She reports that her diet includes fresh fruit, protein and salad.    Since December 2019, she has been working on getting treatment for sleep apnea.    ORT:  7  PHQ-9:6      Medical records review:  I reviewed the note from the referring provider Jacque Song A.P.R.N. dated 01/29/2020 she was seen for basilar artery migraine, intractable.  She was doing better with Emgality, FMLA form was completed, toradol was given for acute episode.  Moderate episode of recurrent depression she is doing well with venlafaxine and abilify.    Previous treatments:    Physical Therapy: Yes    Medications the patient is tried: see above    Previous interventions: see above    Previous surgeries to relieve the above pain:  none      ROS:  Gen: nausea and vomiting with migraines  GI: chronic constipation  Psych: depression  Red Flags ROS:   Fever, Chills, Sweats: Denies  Involuntary Weight Loss: Denies  Bladder Incontinence: Denies  Bowel Incontinence: Denies  Saddle Anesthesia: Denies    All other systems reviewed and negative.       PMHx:   Past Medical History:   Diagnosis Date   • Alcohol addiction ([facility])    • Allergy    • Angina last 2 years    pt attributes to asthma   • Apnea, sleep    • Arthritis current    osteoarthritis   • Back pain    • Bronchitis     4-5x/year   • Chickenpox    • Constipation    • Daytime sleepiness    • Depression    • Dizziness    • Drug addiction ([facility])    • EBV positive mononucleosis syndrome    • Eye pain    • Fatigue    • Fever    • Fracture of finger     rt index x 3   • Frequent headaches    • Gasping for breath    • GERD (gastroesophageal reflux disease)    • Hemorrhoids    • Hyperlipidemia    • Hypothyroidism    • IBS (irritable bowel syndrome)    • Indigestion    • Influenza    • Iron deficiency anemia    • LBP (low back pain)     si joint epidural 2008   • Morning headache    • Painful joint    • Rectal fissure    • Snoring    • Sore muscles    • Varicose veins    • Wears glasses        PSHx:   Past Surgical History:   Procedure Laterality Date   • TRACHEOSTOMY  10/29/2009    Performed by NIDHI LARA at [facility] ORS   • GASTROSTOMY  LAPAROSCOPIC  10/29/2009    Performed by NIDHI LARA at SURGERY [facility]       Family history   Family History   Problem Relation Age of Onset   • Hypertension Mother    • Hyperlipidemia Mother    • Alcohol/Drug Mother    • Psychiatric Illness Mother    • Hypertension Father    • Hyperlipidemia Father    • Alcohol/Drug Father    • Psychiatric Illness Father    • Cancer Maternal Grandfather    • Heart Disease Maternal Grandfather    • Heart Disease Paternal Grandfather    • Alcohol/Drug Paternal Grandfather          Medications:   Current Outpatient Medications   Medication   • Arginine 500 MG Cap   • Baclofen 5 MG Tab   • modafinil (PROVIGIL) 200 MG Tab   • Galcanezumab-gnlm (EMGALITY) 120 MG/ML Solution Auto-injector   • benzonatate (TESSALON) 100 MG Cap   • predniSONE (DELTASONE) 10 MG Tab   • Ketorolac Tromethamine (SPRIX) 15.75 MG/SPRAY Solution   • metoclopramide (REGLAN) 10 MG Tab   • ondansetron (ZOFRAN) 4 MG Tab tablet   • promethazine (PHENERGAN) 25 MG Tab   • omeprazole (PRILOSEC) 20 MG delayed-release capsule   • aripiprazole (ABILIFY) 5 MG tablet   • Diclofenac Sodium 1 % Gel   • venlafaxine XR (EFFEXOR XR) 75 MG CAPSULE SR 24 HR   • Methylsulfonylmethane (MSM PO)   • indomethacin (INDOCIN) 50 MG Cap   • Lactobacillus Acidophilus Powder   • montelukast (SINGULAIR) 10 MG Tab   • Cetirizine HCl 10 MG Cap   • azelastine (ASTELIN) 137 MCG/SPRAY nasal spray   • QVAR 80 MCG/ACT inhaler   • acetaminophen (TYLENOL) 500 MG TABS   • methocarbamol (ROBAXIN) 500 MG Tab   • hydrOXYzine HCl (ATARAX) 25 MG Tab   • fluticasone (FLONASE) 50 MCG/ACT nasal spray   • MAGNESIUM PO     No current facility-administered medications for this visit.        Allergies:   Allergies   Allergen Reactions   • Ciprofloxacin    • Compazine Unspecified     agitation   • Diclofenac      Upset     • Epinephrine      palpitations   • Latex        Social Hx:   Social History     Socioeconomic History   • Marital status:       Spouse name: Not on file   • Number of children: Not on file   • Years of education: Not on file   • Highest education level: Not on file   Occupational History   • Not on file   Social Needs   • Financial resource strain: Not on file   • Food insecurity     Worry: Not on file     Inability: Not on file   • Transportation needs     Medical: Not on file     Non-medical: Not on file   Tobacco Use   • Smoking status: Former Smoker     Packs/day: 0.50     Years: 19.00     Pack years: 9.50     Types: Cigarettes     Start date: 1998     Last attempt to quit: 8/15/2015     Years since quittin.6   • Smokeless tobacco: Never Used   • Tobacco comment: Quit 2015   Substance and Sexual Activity   • Alcohol use: Yes     Alcohol/week: 0.0 oz     Frequency: 2-3 times a week     Drinks per session: 1 or 2     Binge frequency: Never     Comment: one or two a week   • Drug use: Yes     Types: Methamphetamines     Comment: clean since   Rehab ,  speed   • Sexual activity: Yes     Partners: Male     Birth control/protection: Pill   Lifestyle   • Physical activity     Days per week: Not on file     Minutes per session: Not on file   • Stress: Not on file   Relationships   • Social connections     Talks on phone: Not on file     Gets together: Not on file     Attends Confucianism service: Not on file     Active member of club or organization: Not on file     Attends meetings of clubs or organizations: Not on file     Relationship status: Not on file   • Intimate partner violence     Fear of current or ex partner: Not on file     Emotionally abused: Not on file     Physically abused: Not on file     Forced sexual activity: Not on file   Other Topics Concern   •  Service No   • Blood Transfusions Yes   • Caffeine Concern No   • Occupational Exposure Yes   • Hobby Hazards No   • Sleep Concern No   • Stress Concern No   • Weight Concern Yes   • Special Diet No   • Back Care No   • Exercise No   • Bike Helmet  "No   • Seat Belt Yes   • Self-Exams Yes   Social History Narrative   • Not on file         EXAMINATION     Physical Exam:   Vitals: /78 (BP Location: Left arm, Patient Position: Sitting, BP Cuff Size: Small adult)   Pulse 94   Temp 36.9 °C (98.4 °F) (Temporal)   Ht 1.549 m (5' 1\")   Wt 79.3 kg (174 lb 13.2 oz)   SpO2 98%     Constitutional:   Body Habitus: Body mass index is 33.03 kg/m².  Cooperation: Fully cooperates with exam  Appearance: Well-groomed, well-nourished, not disheveled, no acute distress    Eyes: No scleral icterus, no proptosis     ENT -no obvious auditory deficits, no facial droop     Skin -no rashes or lesions noted     Respiratory-  breathing comfortable on room air, no audible wheezing    Cardiovascular- capillary refills less than 2 seconds. No lower extremity edema is noted.     Gastrointestinal - no obvious abdominal masses, No tenderness to palpation in the abdomen    Psychiatric- alert and oriented ×3. Normal affect.     Gait - normal gait, no use of ambulatory device, nonantalgic. The patient can toe walk with ease. The patient can heel walk with ease. Balance is appropriate..     Musculoskeletal -   Cervical spine   Inspection: No deformities of the skin over the cervical spine. No rashes or lesions.    Minimally decreased A/P ROM in all directions, with  pain on the right side of her neck    Spurling’s sign: negative bilaterally    No signs of muscular atrophy in bilateral upper extremities     Mild tenderness to palpation of the cervical facets on the right    Thoracic/Lumbar Spine/Sacral Spine/Hips   Inspection: No evidence of atrophy in bilateral lower extremities throughout     ROM: full  AROM with flexion, extension, lateral flexion, and rotation bilaterally, without pain     Palpation:   No tenderness to palpation in midline at T1-T12 levels. No tenderness to palpation in the left and right of the midline T1-L5  palpation over SI joint: negative bilaterally    palpation " over buttock: negative bilaterally    palpation in hip or over the greater trochanters: negative bilaterally      Lumbar spine Special tests  Neuro tension  Seated straight leg test negative bilaterally        SI joint tests  Observation patient sits on one buttocks: Negative    Neuro     Key points for the international standards for neurological classification of spinal cord injury (ISNCSCI) to light touch.     Dermatome R L   C4 2 2   C5 2 2   C6 2 2   C7 2 2   C8 2 2   T1 2 2   T2 2 2   L2 2 2   L3 2 2   L4 2 2   L5 2 2   S1 2 2   S2 2 2           Motor Exam Upper Extremities   ? Myotome R L   Shoulder flexion C5 5 5   Elbow flexion C5 5 5   Wrist extension C6 5 5   Elbow extension C7 5 5   Finger flexion C8 5 5   Finger abduction T1 5 5         Motor Exam Lower Extremities    ? Myotome R L   Hip flexion L2 5 5   Knee extension L3 5 5   Ankle dorsiflexion L4 5 5   Toe extension L5 5 5   Ankle plantarflexion S1 5 5         Mccabe’s sign negative bilaterally   Babinski sign negative bilaterally   Clonus of the ankle negative bilaterally     Reflexes  ?  R L   Biceps  2+ 2+   Brachioradialis  2+ 2+   Patella  2+ 2+   Achilles   2+ 2+       MEDICAL DECISION MAKING    Medical records review: see under HPI section.     DATA    Labs:   Lab Results   Component Value Date/Time    SODIUM 138 04/24/2019 06:18 AM    POTASSIUM 4.0 04/24/2019 06:18 AM    CHLORIDE 104 04/24/2019 06:18 AM    CO2 26 04/24/2019 06:18 AM    ANION 8.0 04/24/2019 06:18 AM    GLUCOSE 93 04/24/2019 06:18 AM    GLUCOSE 92 04/24/2019 06:18 AM    BUN 13 04/24/2019 06:18 AM    CREATININE 0.87 04/24/2019 06:18 AM    CREATININE 0.63 10/07/2010 02:20 PM    CALCIUM 9.2 04/24/2019 06:18 AM    ASTSGOT 17 04/24/2019 06:18 AM    ALTSGPT 6 04/24/2019 06:18 AM    TBILIRUBIN 0.5 04/24/2019 06:18 AM    ALBUMIN 4.3 04/24/2019 06:18 AM    TOTPROTEIN 6.8 04/24/2019 06:18 AM    GLOBULIN 2.5 04/24/2019 06:18 AM    AGRATIO 1.7 04/24/2019 06:18 AM       Lab Results    Component Value Date/Time    PROTHROMBTM 13.8 (H) 11/04/2009 12:45 AM    INR 1.17 (H) 11/04/2009 12:45 AM        Lab Results   Component Value Date/Time    WBC 6.0 06/15/2018 08:08 AM    WBC 6.5 10/07/2010 02:20 PM    RBC 4.62 06/15/2018 08:08 AM    RBC 4.43 10/07/2010 02:20 PM    HEMOGLOBIN 13.4 06/15/2018 08:08 AM    HEMATOCRIT 41.7 06/15/2018 08:08 AM    MCV 90.3 06/15/2018 08:08 AM    MCV 92 10/07/2010 02:20 PM    MCH 29.0 06/15/2018 08:08 AM    MCH 28.7 10/07/2010 02:20 PM    MCHC 32.1 (L) 06/15/2018 08:08 AM    MPV 10.0 06/15/2018 08:08 AM    NEUTSPOLYS 57.70 06/15/2018 08:08 AM    LYMPHOCYTES 29.60 06/15/2018 08:08 AM    MONOCYTES 9.40 06/15/2018 08:08 AM    EOSINOPHILS 2.80 06/15/2018 08:08 AM    BASOPHILS 0.30 06/15/2018 08:08 AM    HYPOCHROMIA 1+ 07/19/2013 03:13 PM    ANISOCYTOSIS 1+ 11/13/2009 04:15 AM        Lab Results   Component Value Date/Time    HBA1C 5.4 04/24/2019 06:18 AM        Imaging: I personally reviewed following images, these are my reads  MRI cervical spine 10/27/2017  Normal MRI cervical spine with evidence of disc herniation, no central or foraminal stenosis.    IMAGING radiology reads. I reviewed the following radiology reads     Results for orders placed during the hospital encounter of 10/27/17   MR-BRAIN PITUITARY W/WO    Impression 1.  Unchanged thickening of the pituitary stalk with unchanged differential as previously described including but not limited to lymphocytic hypophysitis and infundibuloneurohypophysitis.  2.  No evidence of acute territorial infarct, intracranial hemorrhage or mass lesion.      Results for orders placed during the hospital encounter of 10/21/11   MR-BRAIN-WITH & W/O    Impression 1. Abnormal thickening of the pituitary stalk.  In a woman of childbearing age, the possibility of lymphocytic hypophysitis would bear consideration particularly if the patient is in a postpartum state.  This entity can be associated with hyperprolactinemia. A closely  related autoimmune entity, infundibulo-neurohypophysitis (INH) shows the same imaging findings.  Other lesions which could infiltrate the pituitary stalk include but are not limited to sarcoidosis, lymphoma, Langerhans cell histiocytosis (a pediatric disease). Primary tumors of the pituitary stalk (pituicytomas), sometimes called infundibulomas are quite rare.  Likewise, granular cell tumors (choristoma) are quite rare and are not specific to the pituitary stalk.  Metastasis would be unlikely in the absence of known primary tumor.  Among all of these possibilities, I would favor infundibulo-neurohypophysitis.                              Results for orders placed during the hospital encounter of 10/27/17   MR-CERVICAL SPINE-W/O    Impression Normal MRI scan of the cervical spine without contrast.                                                                   Diagnosis   Visit Diagnoses     ICD-10-CM   1. Myalgia M79.10   2. Cervicalgia M54.2           ASSESSMENT:  Bree Garcianegrito Gage 39 y.o. female seen as above     Bree was seen today for new patient.    Diagnoses and all orders for this visit:    Myalgia  -     Baclofen 5 MG Tab; Take 1 Tab by mouth 3 times a day as needed (1/2-1 po tid prn) for up to 30 days.  -     REFERRAL FOR ACUPUNCTURE    Cervicalgia  -     REFERRAL FOR ACUPUNCTURE      1. Discussed that I will obtain outside records from her treatment at [facility].  2. Discussed stopping robaxin and start low dose trial of baclofen, to see if this is more effective.  She does not think that she has taken this.  3. Continue care with Neurology, PCP and Sleep   4. Trial of acupuncture discussed.  She would like to trial this, reports it is not something she has tried yet.  5. Discussed her exercise routine and I would like her to trial a gentle home yoga program, with goal of starting for 30 minutes three times a week.    Outside records requested:  The patient signed outside records  request form for her outside records including outside images.      Follow-up: Return in about 4 weeks (around 5/15/2020).    Thank you very much for asking me to participate in Bree Schaeffer Too's care.  Please contact me with any questions or concerns.    Please note that this dictation was created using voice recognition software. I have made every reasonable attempt to correct obvious errors but there may be errors of grammar and content that I may have overlooked prior to finalization of this note.      Ck Pruitt MD  Physical Medicine and Rehabilitation  Interventional Spine and Sports Physiatry  [facility]       Jacque Marroquin A.P.RJAIRON.

## 2020-04-17 NOTE — PROGRESS NOTES
"Subjective:      Bree Gage is a 39 y.o. female who presents for routine 6-month follow-up, with a history of basilar artery migraine.    HPI    Bree states that the headaches have continued to improve, in fact she has had no full on migraines for the last 2 months.  She remains on Emgality 120 mg injections monthly, and though the drug does wear off to a degree, the severity and interval of time before her next injection have both improved.  She does have her Sprix nasal spray available, now with the PA approved, and fortunately she has not had to use the drug.  She is asking about Fioricet, it is quite expensive, but also wondering if it is even necessary.    She is finally using her CPAP at night, having some difficulty keeping it on, but she feels she is getting it right about 90% of the time.  This has helped sleep hygiene.  She is scheduled for right ankle surgery for some type of chronic ligamentous problem.  She will be hobbled for a while.  Her bruxism is being evaluated but given the COVID-19 pandemic, considered an elective process, this is waiting.    Medical, surgical and family histories are reviewed, there are no new drug allergies.  She is on Emgality 120 mg injections every 4 weeks, Sprix nasal spray with Phenergan 25 mg or Reglan 10 mg as needed, Provigil 200 mg daily Effexor, Indocin 50 mg, twice daily, the rest as per the electronic health record, noncontributory from my standpoint.    Review of Systems   Neurological: Positive for headaches.   All other systems reviewed and are negative.       Objective:     /70 (BP Location: Left arm, Patient Position: Sitting, BP Cuff Size: Adult)   Pulse 84   Ht 1.549 m (5' 1\")   Wt 79.8 kg (176 lb)   SpO2 96%   BMI 33.25 kg/m²      Physical Exam    She appears in no acute distress.  Vital signs are stable.  There is no malar rash.  There is some bilateral temporal and jaw tenderness only, there is no jaw claudication.  Cardiac " evaluation reveals a regular rhythm.    Including mental status, cranial nerves, musculoskeletal, coordination, and since evaluations, the examination remains intact.     Assessment/Plan:     1. Basilar artery migraine, intractable  Improved further, we will continue the Emgality, she has her nasal spray, Sprix, available to use as needed.  Fioricet with codeine is not a drug I would like to use, she was told that this is not something I will prescribe, and it does not seem there was a need anyway.  There are other rescue medications that are recently published and proven to be effective, including Ubrelvy, Nurtec and Rayvow.  We will cross that bridge when we need to go there.    Is suspect things are getting better since she has been on Emgality for longer, certainly the ROME being treated is not hurting.  We can follow-up in 6 months.    Time: 20-minute spent face-to-face for exam, review, discussion, and education, of this over 50% of the time spent counseling and coordinating care.

## 2020-05-05 ENCOUNTER — HOSPITAL ENCOUNTER (OUTPATIENT)
Dept: RADIOLOGY | Facility: MEDICAL CENTER | Age: 40
End: 2020-05-05
Attending: ORTHOPAEDIC SURGERY
Payer: COMMERCIAL

## 2020-05-05 ENCOUNTER — PATIENT MESSAGE (OUTPATIENT)
Dept: MEDICAL GROUP | Facility: MEDICAL CENTER | Age: 40
End: 2020-05-05

## 2020-05-05 DIAGNOSIS — M25.371 UNSTABLE RIGHT ANKLE: ICD-10-CM

## 2020-05-05 DIAGNOSIS — M25.571 RIGHT ANKLE PAIN, UNSPECIFIED CHRONICITY: ICD-10-CM

## 2020-05-05 PROCEDURE — 73721 MRI JNT OF LWR EXTRE W/O DYE: CPT | Mod: RT

## 2020-05-05 PROCEDURE — 73718 MRI LOWER EXTREMITY W/O DYE: CPT | Mod: RT

## 2020-05-12 ENCOUNTER — TELEPHONE (OUTPATIENT)
Dept: PHYSICAL MEDICINE AND REHAB | Facility: MEDICAL CENTER | Age: 40
End: 2020-05-12

## 2020-05-12 NOTE — TELEPHONE ENCOUNTER
I called patient and leave a voicemail  to confirm the refill for Bacoflen and also to reschedule her appointment with Dr Pruitt. RR

## 2020-05-29 ENCOUNTER — HOSPITAL ENCOUNTER (OUTPATIENT)
Dept: RADIOLOGY | Facility: MEDICAL CENTER | Age: 40
End: 2020-05-29
Attending: NURSE PRACTITIONER
Payer: COMMERCIAL

## 2020-05-29 DIAGNOSIS — M79.661 PAIN OF RIGHT LOWER LEG: ICD-10-CM

## 2020-05-29 PROCEDURE — 93971 EXTREMITY STUDY: CPT | Mod: RT

## 2020-06-10 ENCOUNTER — PHYSICAL THERAPY (OUTPATIENT)
Dept: PHYSICAL THERAPY | Facility: REHABILITATION | Age: 40
End: 2020-06-10
Attending: NURSE PRACTITIONER
Payer: COMMERCIAL

## 2020-06-10 ENCOUNTER — PATIENT MESSAGE (OUTPATIENT)
Dept: MEDICAL GROUP | Facility: MEDICAL CENTER | Age: 40
End: 2020-06-10

## 2020-06-10 DIAGNOSIS — M25.371 OTHER INSTABILITY, RIGHT ANKLE: ICD-10-CM

## 2020-06-10 DIAGNOSIS — M25.571 PAIN IN RIGHT ANKLE AND JOINTS OF RIGHT FOOT: ICD-10-CM

## 2020-06-10 PROCEDURE — 97110 THERAPEUTIC EXERCISES: CPT

## 2020-06-10 PROCEDURE — 97140 MANUAL THERAPY 1/> REGIONS: CPT

## 2020-06-10 PROCEDURE — 97162 PT EVAL MOD COMPLEX 30 MIN: CPT

## 2020-06-10 RX ORDER — HYDROXYZINE HYDROCHLORIDE 25 MG/1
12.5-25 TABLET, FILM COATED ORAL 3 TIMES DAILY PRN
Qty: 90 TAB | Refills: 0 | Status: SHIPPED | OUTPATIENT
Start: 2020-06-10 | End: 2020-10-09

## 2020-06-10 ASSESSMENT — ENCOUNTER SYMPTOMS
EXACERBATED BY: SITTING
EXACERBATED BY: WALKING
PAIN TIMING: EVERY DAY
PAIN TIMING: WHEN ACTIVE
ALLEVIATING FACTORS: ELEVATION
ALLEVIATING FACTORS: COLD/ICE

## 2020-06-10 NOTE — Clinical Note
June 17, 2020    Reymundo Negron M.D.  [address] [place] [address]    Patient: Bree Gage   YOB: 1980   Date of Visit: 6/10/2020       Dear Reymundo Negron M.d.  [address] [id number] [phone]    The attached plan of care is being sent to you because your patient’s medical reimbursement requires that you certify the plan of care. Your signature is required to allow uninterrupted insurance coverage.      You may indicate your approval by signing below and faxing this form back to us at No information on file..    Please call Dept: [phone] with any questions or concerns.    Thank you for this referral,        Mckinley Reyes, PT  [facility] PHYSICAL THERAPY [address] [id number] [phone]    Payer: Payor: [employer] [employer] / Plan: [facility] 05 / Product Type: *No Product type* /                                                                  Specialty Plan of Care 06/10/20   Effective from: 6/10/2020  Effective to: 8/5/2020    Plan ID: [id number]           Participants     Name Type Comments Contact Info    Reymundo Negron M.D. Provider  [phone]    Mckinley Reyes, PT PT        Evaluation/Progress Summary     Author: Mckinley Reyes, PT Status: Signed Last edited: 6/10/2020  9:00 AM         Outpatient Physical Therapy  INITIAL EVALUATION    [facility] Physical [address], [address]  [facility] [address]  Phone:  [phone]    Date of Evaluation: 06/10/2020    Patient: Bree Gage  YOB: 1980  MRN: [id number]     Referring Provider: Reymundo Negron M.D.  [address] [place] [address]   Referring Diagnosis Other instability, right ankle [M25.371];Pain in right ankle and joints of right foot [M25.571]     Time Calculation    Start time: 0900  Stop time: 1000 Time Calculation (min): 60 minutes         Chief Complaint: Ankle  "Problem    Visit Diagnoses     ICD-10-CM   1. Pain in right ankle and joints of right foot  M25.571   2. Other instability, right ankle  M25.371         Subjective:   History of Present Illness:     Date of surgery:  5/22/2020    Mechanism of injury:  \"I don't feel like I am doing well!\" I returned to work on light duty this week and I had a lot of throbbing pain in ankle. I am spending most of my time sitting but sometimes I find myself \"doing more than I should\". I am walking in the house without the CAM boot on. Occupation: medical assistant, on light duty  R. Ankle injury 2010, fractured. Chronic ankle sprains for 20 years duration. Had a double syndesmosis repair, guide wires and a plate used to fixate.  .     Patient present walking with CAM walker boot and no assistive device.   Pain:     Pain timing:  Every day and when active    Relieving factors:  Elevation and cold/ice    Aggravating factors:  Sitting and walking  Patient Goals:     Patient goals for therapy:  Return to sport/leisure activities and improved balance    Other patient goals:  Wear high heeled shoes safely, return to hiking,       Past Medical History:   Diagnosis Date   • Alcohol addiction (HCC)    • Allergy    • Angina last 2 years    pt attributes to asthma   • Apnea, sleep    • Arthritis current    osteoarthritis   • Back pain    • Bronchitis     4-5x/year   • Chickenpox    • Constipation    • Daytime sleepiness    • Depression    • Dizziness    • Drug addiction (HCC)    • EBV positive mononucleosis syndrome    • Eye pain    • Fatigue    • Fever    • Fracture of finger     rt index x 3   • Frequent headaches    • Gasping for breath    • GERD (gastroesophageal reflux disease)    • Hemorrhoids    • Hyperlipidemia    • Hypothyroidism    • IBS (irritable bowel syndrome)    • Indigestion    • Influenza    • Iron deficiency anemia    • LBP (low back pain)     si joint epidural 2008   • Morning headache    • Painful joint    • Rectal fissure    "   • Snoring    • Sore muscles    • Varicose veins    • Wears glasses      Past Surgical History:   Procedure Laterality Date   • TRACHEOSTOMY  10/29/2009    Performed by NIDHI LARA at SURGERY [facility]   • GASTROSTOMY LAPAROSCOPIC  10/29/2009    Performed by NIDHI LARA at SURGERY [facility]     Social History     Tobacco Use   • Smoking status: Former Smoker     Packs/day: 0.50     Years: 19.00     Pack years: 9.50     Types: Cigarettes     Start date: 1998     Last attempt to quit: 8/15/2015     Years since quittin.8   • Smokeless tobacco: Never Used   • Tobacco comment: Quit 2015   Substance Use Topics   • Alcohol use: Yes     Alcohol/week: 0.0 oz     Frequency: 2-3 times a week     Drinks per session: 1 or 2     Binge frequency: Never     Comment: one or two a week     Family and Occupational History     Socioeconomic History   • Marital status:      Spouse name: Not on file   • Number of children: Not on file   • Years of education: Not on file   • Highest education level: Not on file   Occupational History   • Not on file       Objective     Active Range of Motion   Left Ankle/Foot   Dorsiflexion (ke): 12 degrees   Plantar flexion: 80 degrees   Inversion: 50 degrees   Eversion: 45 degrees     Right Ankle/Foot   Dorsiflexion (ke): -35 degrees   Plantar flexion: 60 degrees   Inversion: 10 degrees   Eversion: 15 degrees     Passive Range of Motion     Additional Passive Range of Motion Details  PROM Same as AROM no overpressure applied at end range    Swelling   Left Ankle/Foot   Metatarsal heads: 20 cm  Malleoli: 26 cm    Right Ankle/Foot   Metatarsal heads: 21 cm  Malleoli: 23 cm        Therapeutic Exercises (CPT 78554):     1. Ankle AROM leg elevated     2. Towel crunches     Therapeutic Treatments and Modalities:     1. Manual Therapy (CPT 86902), R. ankle, effleurage for edema reduction     2. Manual Therapy (CPT 07993), R. ankle, TCJ distraction gr II      Time-based treatments/modalities:    Physical Therapy Timed Treatment Charges  Manual therapy minutes (CPT 53816): 15 minutes  Therapeutic exercise minutes (CPT 70753): 15 minutes      Assessment, Response and Plan:   Impairments: abnormal coordination, abnormal gait, abnormal or restricted ROM, impaired physical strength, swelling and weight-bearing intolerance    Assessment details:  Mrs. Gage is attending PT S/P R. Ankle syndesmosis repair secondary to chronic ankle sprains and report of an old non-healed lateral malleolus fracture. Referring diagnosis was chronic right ankle instability. Surgery reported by patient as occurring on 5/22/2020. At time of this eval we had not received information in the referral indicating this patient had a surgery, and no information provided on weight bearing status. Patient presented today walking without any assistive device, fully weight bearing through affected lower extremity, and wearing a CAM boot. Some research supports immediate weight bearing as tolerated after these surgeries but we are following up with surgeon for clarity. Patient presented with a high amount of anxiety regarding symptoms she is experiencing since returning to work this week. She appears to be doing well at this stage of rehab and that much of her complaints seemed to be related to not elevating her leg enough, and not wearing a compression sock. She is not experiencing any sharp pain when walking with the CAM boot on, and starting foot and ankle AROM went well for her. Patient can benefit from PT to work on restoring normal ankle mobility, weaning off the brace once indicated and establishing a normal gait, and finally regaining good proprioception and motor control for balance.   Prognosis: good    Goals:   Short Term Goals:   Full and pain free ankle AROM  Normal gait once surgeon has cleared her to stop wearing the boot  4/5 ankle strength through MMT and no pain with testing   Short term  goal time span:  2-4 weeks      Long Term Goals:    Demonstrate good foot/ankle proprioception in static single leg stance  Demonstrate good foot/ankle proprioception in dynamic single leg stance  WOMAC score indicating less than 10% disability    Long term goal time span:  6-8 weeks    Plan:   Planned therapy interventions:  E Stim Unattended (CPT 93747), Functional Training, Self Care (CPT 79956), Hot or Cold Pack Therapy (CPT 35114), Manual Therapy (CPT 46058), Neuromuscular Re-education (CPT 56856), Self Care ADL Training (CPT 75836), Therapeutic Activities (CPT 63854) and Therapeutic Exercise (CPT 14603)  Frequency:  2x week  Duration in weeks:  8  Discussed with:  Patient      Functional Assessment Used  WOMAC Grand Total: 56.25     Referring provider co-signature:  I have reviewed this plan of care and my co-signature certifies the need for services.    Physician Signature: ________________________________ Date: ______________                      Updated Participants     Name Type Comments Contact Info    Reymundo Negron M.D. Provider  [phone]    Signature pending    Mckinley Reyes, PT PT

## 2020-06-10 NOTE — OP THERAPY EVALUATION
"  Outpatient Physical Therapy  INITIAL EVALUATION    [facility] Physical Therapy [address], [address]  [place] [address]  Phone:  [phone]    Date of Evaluation: 06/10/2020    Patient: Bree Gage  YOB: 1980  MRN: [id number]     Referring Provider: Reymundo Negron M.D.  [address] [place] [address]   Referring Diagnosis Other instability, right ankle [M25.371];Pain in right ankle and joints of right foot [M25.571]     Time Calculation    Start time: 0900  Stop time: 1000 Time Calculation (min): 60 minutes         Chief Complaint: Ankle Problem    Visit Diagnoses     ICD-10-CM   1. Pain in right ankle and joints of right foot  M25.571   2. Other instability, right ankle  M25.371         Subjective:   History of Present Illness:     Date of surgery:  5/22/2020    Mechanism of injury:  \"I don't feel like I am doing well!\" I returned to work on light duty this week and I had a lot of throbbing pain in ankle. I am spending most of my time sitting but sometimes I find myself \"doing more than I should\". I am walking in the house without the CAM boot on. Occupation: medical assistant, on light duty  R. Ankle injury 2010, fractured. Chronic ankle sprains for 20 years duration. Had a double syndesmosis repair, guide wires and a plate used to fixate.  .     Patient present walking with CAM walker boot and no assistive device.   Pain:     Pain timing:  Every day and when active    Relieving factors:  Elevation and cold/ice    Aggravating factors:  Sitting and walking  Patient Goals:     Patient goals for therapy:  Return to sport/leisure activities and improved balance    Other patient goals:  Wear high heeled shoes safely, return to hiking,       Past Medical History:   Diagnosis Date   • Alcohol addiction (HCC)    • Allergy    • Angina last 2 years    pt attributes to asthma   • Apnea, sleep    • Arthritis current    osteoarthritis   • Back pain    • Bronchitis     " 4-5x/year   • Chickenpox    • Constipation    • Daytime sleepiness    • Depression    • Dizziness    • Drug addiction (HCC)    • EBV positive mononucleosis syndrome    • Eye pain    • Fatigue    • Fever    • Fracture of finger     rt index x 3   • Frequent headaches    • Gasping for breath    • GERD (gastroesophageal reflux disease)    • Hemorrhoids    • Hyperlipidemia    • Hypothyroidism    • IBS (irritable bowel syndrome)    • Indigestion    • Influenza    • Iron deficiency anemia    • LBP (low back pain)     si joint epidural    • Morning headache    • Painful joint    • Rectal fissure    • Snoring    • Sore muscles    • Varicose veins    • Wears glasses      Past Surgical History:   Procedure Laterality Date   • TRACHEOSTOMY  10/29/2009    Performed by NIDHI LARA at SURGERY [facility]   • GASTROSTOMY LAPAROSCOPIC  10/29/2009    Performed by NIDHI LARA at SURGERY [facility]     Social History     Tobacco Use   • Smoking status: Former Smoker     Packs/day: 0.50     Years: 19.00     Pack years: 9.50     Types: Cigarettes     Start date: 1998     Last attempt to quit: 8/15/2015     Years since quittin.8   • Smokeless tobacco: Never Used   • Tobacco comment: Quit 2015   Substance Use Topics   • Alcohol use: Yes     Alcohol/week: 0.0 oz     Frequency: 2-3 times a week     Drinks per session: 1 or 2     Binge frequency: Never     Comment: one or two a week     Family and Occupational History     Socioeconomic History   • Marital status:      Spouse name: Not on file   • Number of children: Not on file   • Years of education: Not on file   • Highest education level: Not on file   Occupational History   • Not on file       Objective     Active Range of Motion   Left Ankle/Foot   Dorsiflexion (ke): 12 degrees   Plantar flexion: 80 degrees   Inversion: 50 degrees   Eversion: 45 degrees     Right Ankle/Foot   Dorsiflexion (ke): -35 degrees   Plantar flexion: 60 degrees    Inversion: 10 degrees   Eversion: 15 degrees     Passive Range of Motion     Additional Passive Range of Motion Details  PROM Same as AROM no overpressure applied at end range    Swelling   Left Ankle/Foot   Metatarsal heads: 20 cm  Malleoli: 26 cm    Right Ankle/Foot   Metatarsal heads: 21 cm  Malleoli: 23 cm        Therapeutic Exercises (CPT 11744):     1. Ankle AROM leg elevated     2. Towel crunches     Therapeutic Treatments and Modalities:     1. Manual Therapy (CPT 77874), R. ankle, effleurage for edema reduction     2. Manual Therapy (CPT 54566), R. ankle, TCJ distraction gr II     Time-based treatments/modalities:    Physical Therapy Timed Treatment Charges  Manual therapy minutes (CPT 69379): 15 minutes  Therapeutic exercise minutes (CPT 52072): 15 minutes      Assessment, Response and Plan:   Impairments: abnormal coordination, abnormal gait, abnormal or restricted ROM, impaired physical strength, swelling and weight-bearing intolerance    Assessment details:  Mrs. Gage is attending PT S/P R. Ankle syndesmosis repair secondary to chronic ankle sprains and report of an old non-healed lateral malleolus fracture. Referring diagnosis was chronic right ankle instability. Surgery reported by patient as occurring on 5/22/2020. At time of this eval we had not received information in the referral indicating this patient had a surgery, and no information provided on weight bearing status. Patient presented today walking without any assistive device, fully weight bearing through affected lower extremity, and wearing a CAM boot. Some research supports immediate weight bearing as tolerated after these surgeries but we are following up with surgeon for clarity. Patient presented with a high amount of anxiety regarding symptoms she is experiencing since returning to work this week. She appears to be doing well at this stage of rehab and that much of her complaints seemed to be related to not elevating her leg enough,  and not wearing a compression sock. She is not experiencing any sharp pain when walking with the CAM boot on, and starting foot and ankle AROM went well for her. Patient can benefit from PT to work on restoring normal ankle mobility, weaning off the brace once indicated and establishing a normal gait, and finally regaining good proprioception and motor control for balance.   Prognosis: good    Goals:   Short Term Goals:   Full and pain free ankle AROM  Normal gait once surgeon has cleared her to stop wearing the boot  4/5 ankle strength through MMT and no pain with testing   Short term goal time span:  2-4 weeks      Long Term Goals:    Demonstrate good foot/ankle proprioception in static single leg stance  Demonstrate good foot/ankle proprioception in dynamic single leg stance  WOMAC score indicating less than 10% disability    Long term goal time span:  6-8 weeks    Plan:   Planned therapy interventions:  E Stim Unattended (CPT 30635), Functional Training, Self Care (CPT 64552), Hot or Cold Pack Therapy (CPT 93040), Manual Therapy (CPT 82380), Neuromuscular Re-education (CPT 91441), Self Care ADL Training (CPT 14443), Therapeutic Activities (CPT 42367) and Therapeutic Exercise (CPT 31969)  Frequency:  2x week  Duration in weeks:  8  Discussed with:  Patient      Functional Assessment Used  WOMAC Grand Total: 56.25     Referring provider co-signature:  I have reviewed this plan of care and my co-signature certifies the need for services.    Physician Signature: ________________________________ Date: ______________

## 2020-06-12 ENCOUNTER — APPOINTMENT (OUTPATIENT)
Dept: PHYSICAL THERAPY | Facility: REHABILITATION | Age: 40
End: 2020-06-12
Attending: NURSE PRACTITIONER
Payer: COMMERCIAL

## 2020-06-17 ENCOUNTER — PHYSICAL THERAPY (OUTPATIENT)
Dept: PHYSICAL THERAPY | Facility: REHABILITATION | Age: 40
End: 2020-06-17
Attending: NURSE PRACTITIONER
Payer: COMMERCIAL

## 2020-06-17 DIAGNOSIS — M25.371 OTHER INSTABILITY, RIGHT ANKLE: ICD-10-CM

## 2020-06-17 DIAGNOSIS — M25.571 PAIN IN RIGHT ANKLE AND JOINTS OF RIGHT FOOT: ICD-10-CM

## 2020-06-17 PROCEDURE — 97535 SELF CARE MNGMENT TRAINING: CPT

## 2020-06-17 PROCEDURE — 97110 THERAPEUTIC EXERCISES: CPT

## 2020-06-17 PROCEDURE — 97140 MANUAL THERAPY 1/> REGIONS: CPT

## 2020-06-17 NOTE — OP THERAPY DAILY TREATMENT
Outpatient Physical Therapy  DAILY TREATMENT     [facility] Physical Therapy [address], [address]  [place] [address]  Phone:  [phone]    Date: 06/17/2020    Patient: Bree Gage  YOB: 1980  MRN: [id number]     Time Calculation    Start time: 0230  Stop time: 0315 Time Calculation (min): 45 minutes         Chief Complaint: Ankle Problem    Visit #: 2    SUBJECTIVE:  Less throbbing. Working on AROM. Feeling very stiff. Not having pain with walking, but apprehension     OBJECTIVE:  Current objective measures:     5 deg. Of DF on L  -25 deg on R.   52 deg PF on R.   15 deg inversion             Therapeutic Exercises (CPT 70486):     1. 1/2 foam inversion/eversion/PF/DF, 20 reps each direction     2. Heel slide stretch DF , 10 x 10'' holds    3. Heel slide forward stretch for PF, 10 x 10'' holds    4. Towel crunch, HEP    Therapeutic Treatments and Modalities:     1. Manual Therapy (CPT 05778), R. foot, TCJ distraction Gr III     2. Manual Therapy (CPT 24989), R. foot, stw scar tissue reduction at anteromedial talus    3. Manual Therapy (CPT 24586), R. foot, effleurage     Time-based treatments/modalities:    Physical Therapy Timed Treatment Charges  Functional training, self care minutes (CPT 62281): 10 minutes  Manual therapy minutes (CPT 79858): 15 minutes  Therapeutic exercise minutes (CPT 77188): 20 minutes        ASSESSMENT:   Response to treatment: Patient has not improved or lost foot/ankle mobility since previous PT session. Focus of session was reinforcing Ankle AROM exercise.     PLAN/RECOMMENDATIONS:   Plan for treatment: therapy treatment to continue next visit.  Planned interventions for next visit: continue with current treatment.

## 2020-06-19 ENCOUNTER — PHYSICAL THERAPY (OUTPATIENT)
Dept: PHYSICAL THERAPY | Facility: REHABILITATION | Age: 40
End: 2020-06-19
Attending: NURSE PRACTITIONER
Payer: COMMERCIAL

## 2020-06-19 DIAGNOSIS — M25.371 OTHER INSTABILITY, RIGHT ANKLE: ICD-10-CM

## 2020-06-19 DIAGNOSIS — M25.571 PAIN IN RIGHT ANKLE AND JOINTS OF RIGHT FOOT: ICD-10-CM

## 2020-06-19 PROCEDURE — 97112 NEUROMUSCULAR REEDUCATION: CPT

## 2020-06-19 PROCEDURE — 97140 MANUAL THERAPY 1/> REGIONS: CPT

## 2020-06-19 NOTE — OP THERAPY DAILY TREATMENT
Outpatient Physical Therapy  DAILY TREATMENT     [facility] Outpatient Physical Therapy [address] [facility], [address]  [place] [address]  Phone:  [phone]    Date: 06/19/2020    Patient: Bree Gage  YOB: 1980  MRN: [id number]     Time Calculation    Start time: 0230  Stop time: 0300 Time Calculation (min): 30 minutes         Chief Complaint: Difficulty Walking and Ankle Problem    Visit #: 3    SUBJECTIVE:  I didn't do so much of my exercises this week. My fiance and I are breaking up and that's taken a toll. 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 facilities.     OBJECTIVE:  Current objective measures:     -10 deg of PF  -10 deg of normal of -5 deg of ankle  40 deg of inversion, -8 deg  Compared to other ankle  20 deg on eversion; 30 deg on L. Ankle         Therapeutic Exercises (CPT 95202):     1. 1/2 foam inversion/eversion/PF/DF, 20 reps each direction     2. Heel slide stretch DF , 10 x 10'' holds    3. Heel slide forward stretch for PF, 10 x 10'' holds    4. Towel crunch, HEP    5. Anti gravity eversion off table edge, 2 x 15    6. Anti gravity inversion off table edge , 2 x 15    Therapeutic Treatments and Modalities:     2. Manual Therapy (CPT 57805), R. foot, stw scar tissue reduction at anteromedial talus    3. Manual Therapy (CPT 02307), R. foot, effleurage     Time-based treatments/modalities:    Physical Therapy Timed Treatment Charges  Manual therapy minutes (CPT 23515): 10 minutes  Neuromusc re-ed, balance, coor, post minutes (CPT 79195): 15 minutes  Therapeutic exercise minutes (CPT 39699): 5 minutes        ASSESSMENT:   Response to treatment:Focus of treatment has been improving ankle AROM, patient made a lot of progress between visits and was progressed to anti gravity inversion/eversion to challenge strength and motor control.     PLAN/RECOMMENDATIONS:   Plan for treatment: therapy treatment to continue next  visit.  Planned interventions for next visit: continue with current treatment.

## 2020-06-19 NOTE — TELEPHONE ENCOUNTER
From: Bree Gage  To: XUAN Ko  Sent: 11/13/2019 7:17 AM PST  Subject: Non-Urgent Medical Question    Good Morning!!    My sleep study results are quite dramatic and I follow up with Dr Irvin on Friday. 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 I wanted to give you a heads up on that :)    I have yet to bring in the paperwork that needs to be filled out for [employer] as I have been covering late for weeks at work. How late is the  open for me to drop this off?   Colchicine Pregnancy And Lactation Text: This medication is Pregnancy Category C and isn't considered safe during pregnancy. It is excreted in breast milk.

## 2020-06-23 ENCOUNTER — PHYSICAL THERAPY (OUTPATIENT)
Dept: PHYSICAL THERAPY | Facility: REHABILITATION | Age: 40
End: 2020-06-23
Attending: NURSE PRACTITIONER
Payer: COMMERCIAL

## 2020-06-23 DIAGNOSIS — M25.371 OTHER INSTABILITY, RIGHT ANKLE: ICD-10-CM

## 2020-06-23 DIAGNOSIS — M25.571 PAIN IN RIGHT ANKLE AND JOINTS OF RIGHT FOOT: ICD-10-CM

## 2020-06-23 PROCEDURE — 97140 MANUAL THERAPY 1/> REGIONS: CPT

## 2020-06-23 PROCEDURE — 97110 THERAPEUTIC EXERCISES: CPT

## 2020-06-23 NOTE — OP THERAPY DAILY TREATMENT
Outpatient Physical Therapy  DAILY TREATMENT     [facility] Outpatient Physical Therapy [address] [facility], [address]  [place] [address]  Phone:  [phone]    Date: 06/23/2020    Patient: Bree Gage  YOB: 1980  MRN: [id number]     Time Calculation    Start time: 0400  Stop time: 0430 Time Calculation (min): 30 minutes         Chief Complaint: No chief complaint on file.    Visit #: 4    SUBJECTIVE: The patient reports having limited motion in dorsiflexion and eversion of the (R) foot. Continues to have trouble crossing her legs.      OBJECTIVE:  Current objective measures:     increase AROM of the (R) ankle foot in dorsiflexion; eversion       Therapeutic Exercises (CPT 99235):     1. 1/2 foam inversion/eversion/PF/DF, 20 reps each direction     2. Heel slide stretch DF , 10 x 10'' holds    3. Heel slide forward stretch for PF, 10 x 10'' holds    4. Towel crunch, HEP    5. Anti gravity eversion off table edge, 2 x 15    6. Anti gravity inversion off table edge , 2 x 15    Therapeutic Treatments and Modalities:     1. Neuromuscular Re-education (CPT 84244), (R) balance with PF/DF/Inv/Joleen    2. Manual Therapy (CPT 83966), R. foot, stw scar tissue reduction at anteromedial talus    3. Manual Therapy (CPT 74351), R. foot, effleurage     Time-based treatments/modalities:    Physical Therapy Timed Treatment Charges  Manual therapy minutes (CPT 83415): 10 minutes  Neuromusc re-ed, balance, coor, post minutes (CPT 38833): 5 minutes  Therapeutic exercise minutes (CPT 36661): 15 minutes        ASSESSMENT:   Response to treatment:   (R) ankle TCJ distraction; Subtalar joint A/P's, P/A's inversion and eversion some tenderness upon anterior keloid of (R) lower extremity; tightness during antigravity exercises in inversion and eversion    PLAN/RECOMMENDATIONS:   Plan for treatment: therapy treatment to continue next visit.  Planned interventions for next visit: continue with current treatment.

## 2020-06-26 ENCOUNTER — PHYSICAL THERAPY (OUTPATIENT)
Dept: PHYSICAL THERAPY | Facility: REHABILITATION | Age: 40
End: 2020-06-26
Attending: NURSE PRACTITIONER
Payer: COMMERCIAL

## 2020-06-26 DIAGNOSIS — M25.371 OTHER INSTABILITY, RIGHT ANKLE: ICD-10-CM

## 2020-06-26 DIAGNOSIS — M25.571 PAIN IN RIGHT ANKLE AND JOINTS OF RIGHT FOOT: ICD-10-CM

## 2020-06-26 PROCEDURE — 97110 THERAPEUTIC EXERCISES: CPT

## 2020-06-26 PROCEDURE — 97112 NEUROMUSCULAR REEDUCATION: CPT

## 2020-06-26 PROCEDURE — 97140 MANUAL THERAPY 1/> REGIONS: CPT

## 2020-06-26 NOTE — OP THERAPY DAILY TREATMENT
"  Outpatient Physical Therapy  DAILY TREATMENT     [facility] Physical Therapy [address], [address]  [place] [address]  Phone:  [phone]    Date: 06/26/2020    Patient: Bree Gage  YOB: 1980  MRN: [id number]     Time Calculation                   Chief Complaint: Ankle Problem    Visit #: 5    SUBJECTIVE:  The patient reports doing more (R) foot plantarflexion and dorsiflexion walking over several different surfaces    OBJECTIVE:  Current objective measures:   Increase AROM to the (R) ankle dorsiflexion/plantarflexion/inversion/eversion          Therapeutic Exercises (CPT 58690):     1. 1/2 foam inversion/eversion/PF/DF, 20 reps each direction     2. Heel slide stretch DF , 10 x 10'' holds    3. Heel slide forward stretch for PF, 10 x 10'' holds    4. Towel crunch, HEP    5. Anti gravity eversion off table edge, 2 x 15    6. Anti gravity inversion off table edge , 2 x 15    7. Nu step bike , 10 mijutes    8. Step ups 6\" fwd lateral, 2 x10 reps each    9. Plantar/dorsi/inv/ever with orange tb, 1 x10 reps each    10. Lunge    11. Tandem heel toe foam platform, 3 minutes    Therapeutic Treatments and Modalities:     1. Neuromuscular Re-education (CPT 78132), (R) balance with PF/DF/Inv/Joleen    2. Manual Therapy (CPT 40395), R. foot, stw scar tissue reduction at anteromedial talus    3. Manual Therapy (CPT 68728), R. foot, effleurage     Time-based treatments/modalities:               ASSESSMENT:   Response to treatment:   (R) plantarflexion 70 deg; dorsiflexion -10 deg; patient able to walk with increased signs of normal weightbearing and step length during gait pattern  PLAN/RECOMMENDATIONS:   Plan for treatment: therapy treatment to continue next visit.  Planned interventions for next visit: continue with current treatment.       "

## 2020-07-08 ENCOUNTER — TELEPHONE (OUTPATIENT)
Dept: PULMONOLOGY | Facility: HOSPICE | Age: 40
End: 2020-07-08

## 2020-07-08 NOTE — TELEPHONE ENCOUNTER
The patient called and stated that she was offered a virtual visit for her appt. On 7/9.  She states that she wants to come in person for her visit.

## 2020-07-09 ENCOUNTER — SLEEP CENTER VISIT (OUTPATIENT)
Dept: SLEEP MEDICINE | Facility: MEDICAL CENTER | Age: 40
End: 2020-07-09
Payer: COMMERCIAL

## 2020-07-09 VITALS
RESPIRATION RATE: 16 BRPM | WEIGHT: 171 LBS | HEART RATE: 73 BPM | BODY MASS INDEX: 32.28 KG/M2 | OXYGEN SATURATION: 97 % | DIASTOLIC BLOOD PRESSURE: 80 MMHG | HEIGHT: 61 IN | SYSTOLIC BLOOD PRESSURE: 108 MMHG

## 2020-07-09 DIAGNOSIS — Z87.891 FORMER SMOKER: ICD-10-CM

## 2020-07-09 DIAGNOSIS — R06.83 SNORING: ICD-10-CM

## 2020-07-09 DIAGNOSIS — G25.81 RESTLESS LEG SYNDROME: ICD-10-CM

## 2020-07-09 DIAGNOSIS — G47.10 HYPERSOMNOLENCE: ICD-10-CM

## 2020-07-09 DIAGNOSIS — G47.61 PERIODIC LIMB MOVEMENT DISORDER (PLMD): ICD-10-CM

## 2020-07-09 DIAGNOSIS — G47.33 OBSTRUCTIVE SLEEP APNEA SYNDROME: ICD-10-CM

## 2020-07-09 PROCEDURE — 99214 OFFICE O/P EST MOD 30 MIN: CPT | Performed by: INTERNAL MEDICINE

## 2020-07-09 RX ORDER — MODAFINIL 100 MG/1
200 TABLET ORAL DAILY
Qty: 180 TAB | Refills: 0 | Status: SHIPPED | OUTPATIENT
Start: 2020-07-09 | End: 2020-09-30 | Stop reason: SDUPTHER

## 2020-07-09 RX ORDER — MODAFINIL 100 MG/1
100 TABLET ORAL DAILY
COMMUNITY
End: 2020-07-09 | Stop reason: SDUPTHER

## 2020-07-09 NOTE — PROGRESS NOTES
CC: Sleep apnea hypopnea syndrome.  Daytime somnolence.     HPI:   Ms. Gage is a 38-year-old woman referred by ANETTE Palafox, to assist in the evaluation and management of suspected sleep disordered breathing.  She was initially evaluated on August 30, 2019 and last seen on March 5, 2020.     She has a complicated medical history involving fibromyalgia and chronic pain.  She had an episode in 2009 of pneumonia and respiratory failure with prolonged hospitalization requiring tracheostomy and complicated by acute renal failure.     She dates the onset of her daytime fatigue and somnolence to about age 14.  She presented almost 2 years ago to Dr. Ramon with snoring and daytime somnolence.  She had an [place] sleepiness score of 15 points.  Polysomnography on October 19, 2017 suggested an apnea hypopnea index of 7.2 events per hour with a respiratory disturbance index of 7.3 events per hour and a lowest arterial oxygen saturation of 91% on room air.  In that study she spent fewer than 6 minutes in REM sleep but the study was done mostly in the supine body position.  On the following day she underwent a multiple sleep latency study that demonstrated a mean sleep onset latency of 9 minutes and 26 seconds with no sleep onset REM periods.  She was treated for a time with Provigil which seemed to improve her daytime alertness.     She has a regular sleep schedule, as indicated by the sleep diary, with bedtime at about 8 PM and she falls asleep quite quickly.  She does not typically awaken at night.  She arises between 4 and 5:30 AM feeling tired and groggy with frequent morning headaches.  The spousal questionnaire indicates loud snoring and pauses in breathing as well as twitching of the legs or feet during sleep and bruxism.  She is tired during the day and may doze off during quiet moments and naps when the opportunity presents.  Her [place] sleepiness score is 10 points.  She drinks caffeinated beverages moderately  and sometimes uses over-the-counter antihistamines to facilitate sleep onset but she otherwise does not use substances to induce sleep or to maintain wakefulness.  She does use Fioricet occasionally for migraine headaches and methocarbamol for back pain.  She does not have symptoms suggesting narcolepsy and specifically does not have a history suggesting cataplexy, hypnagogic hallucinations, sleep paralysis or irresistible sleep episodes.     Her medical history includes a fibromyalgia as well as migraine, irritable bowel syndrome and depression.  She is not using narcotic analgesics.  In addition to inhalation allergies she has a history of asthma and has been on inhaled beclomethasone and albuterol in the past but has not required those medications in recent years.  She does use Singulair 10 mg a day as well as over-the-counter antihistamines and Astelin.     She continues to use modafinil at 100 to 200 mg a day which has markedly improved her level of daytime alertness.  She has not noticed any increase in headache frequency with the modafinil and not had side effects such as agitation, nausea, palpitations or trouble sleeping.     The polysomnogram dated November 3, 2019 demonstrated some fragmentation of sleep with increased wake after sleep onset time but 76 minutes of REM sleep, included.  There are frequent periodic limb movements in the periodic limb movement with arousal index is 4.1 events per hour.  The apnea hypopnea index is 7.6 events per hour including hypopnea episodes with just 2 episodes of central apnea.  The index rises to 23.5 events per hour in supine sleep.  The lowest arterial oxygen saturation is 89% on room air.     After discussion she was started on auto titrating CPAP at 5 to 12 cm of water pressure.  She has acclimated to the CPAP but did have some problems remaining on it through the night as she was recovering from her recent ankle surgery.  In the last several weeks she been using  the machine each night, through most of the night.  Her headaches have improved significantly and she is not had a full-blown migraine for several months.     The CPAP data recording chip information is reviewed with the patient.  It demonstrates use on 81 of the last 90 days with an average daily usage of 4 hours and 41 minutes.    It confirms the patient's history that she is using machine for longer periods of time in the last 2 weeks.  The average pressure is 7.1 cm of water with a maximum of 9.6.  The estimated residual apnea hypopnea index is 0.9 events per hour and leak is moderate.        Patient Active Problem List    Diagnosis Date Noted   • Obesity (BMI 30-39.9) 08/30/2019   • History of fracture of right ankle 06/13/2019   • Numbness of right hand 04/17/2019   • Elevated glucose 06/22/2018   • Obesity, Class I, BMI 30.0-34.9 (see actual BMI) 05/23/2018   • Moderate episode of recurrent major depressive disorder (HCC) 05/23/2018   • Asthma in adult without complication 05/23/2018   • Basilar artery migraine, intractable 10/11/2017   • Chronic migraine 10/11/2017   • EEG abnormal 10/11/2017   • Hyperprolactinemia (HCC) 02/13/2013   • Fatigue 09/03/2009   • Preventative health care 09/03/2009   • IBS (irritable bowel syndrome)    • History of alcohol abuse    • History of methamphetamine abuse ([facility])    • History of hypothyroidism    • Hemorrhoids    • Rectal fissure    • Varicose veins    • Iron deficiency anemia    • Hyperlipidemia    • Low back pain    • EBV positive mononucleosis syndrome        Past Medical History:   Diagnosis Date   • Alcohol addiction ([facility])    • Allergy    • Angina last 2 years    pt attributes to asthma   • Apnea, sleep    • Arthritis current    osteoarthritis   • Back pain    • Bronchitis     4-5x/year   • Chickenpox    • Constipation    • Daytime sleepiness    • Depression    • Dizziness    • Drug addiction (HCC)    • EBV positive mononucleosis syndrome    • Eye pain    • Fatigue     • Fever    • Fracture of finger     rt index x 3   • Frequent headaches    • Gasping for breath    • GERD (gastroesophageal reflux disease)    • Hemorrhoids    • Hyperlipidemia    • Hypothyroidism    • IBS (irritable bowel syndrome)    • Indigestion    • Influenza    • Iron deficiency anemia    • LBP (low back pain)     si joint epidural    • Morning headache    • Painful joint    • Rectal fissure    • Snoring    • Sore muscles    • Varicose veins    • Wears glasses        Past Surgical History:   Procedure Laterality Date   • TRACHEOSTOMY  10/29/2009    Performed by NIDHI LARA at SURGERY [facility]   • GASTROSTOMY LAPAROSCOPIC  10/29/2009    Performed by NIDHI LARA at SURGERY [facility]       Family History   Problem Relation Age of Onset   • Hypertension Mother    • Hyperlipidemia Mother    • Alcohol/Drug Mother    • Psychiatric Illness Mother    • Hypertension Father    • Hyperlipidemia Father    • Alcohol/Drug Father    • Psychiatric Illness Father    • Cancer Maternal Grandfather    • Heart Disease Maternal Grandfather    • Heart Disease Paternal Grandfather    • Alcohol/Drug Paternal Grandfather        Social History     Socioeconomic History   • Marital status:      Spouse name: Not on file   • Number of children: Not on file   • Years of education: Not on file   • Highest education level: Not on file   Occupational History   • Not on file   Social Needs   • Financial resource strain: Not on file   • Food insecurity     Worry: Not on file     Inability: Not on file   • Transportation needs     Medical: Not on file     Non-medical: Not on file   Tobacco Use   • Smoking status: Former Smoker     Packs/day: 0.50     Years: 19.00     Pack years: 9.50     Types: Cigarettes     Start date: 1998     Last attempt to quit: 8/15/2015     Years since quittin.9   • Smokeless tobacco: Never Used   • Tobacco comment: Quit 2015   Substance and Sexual Activity   • Alcohol use:  Yes     Alcohol/week: 0.0 oz     Frequency: 2-3 times a week     Drinks per session: 1 or 2     Binge frequency: Never     Comment: one or two a week   • Drug use: Not Currently     Types: Methamphetamines     Comment: clean since 11/02  Rehab 11/02, 5/09 speed   • Sexual activity: Yes     Partners: Male     Birth control/protection: Pill   Lifestyle   • Physical activity     Days per week: Not on file     Minutes per session: Not on file   • Stress: Not on file   Relationships   • Social connections     Talks on phone: Not on file     Gets together: Not on file     Attends Christian service: Not on file     Active member of club or organization: Not on file     Attends meetings of clubs or organizations: Not on file     Relationship status: Not on file   • Intimate partner violence     Fear of current or ex partner: Not on file     Emotionally abused: Not on file     Physically abused: Not on file     Forced sexual activity: Not on file   Other Topics Concern   •  Service No   • Blood Transfusions Yes   • Caffeine Concern No   • Occupational Exposure Yes   • Hobby Hazards No   • Sleep Concern No   • Stress Concern No   • Weight Concern Yes   • Special Diet No   • Back Care No   • Exercise No   • Bike Helmet No   • Seat Belt Yes   • Self-Exams Yes   Social History Narrative   • Not on file       Current Outpatient Medications   Medication Sig Dispense Refill   • modafinil (PROVIGIL) 100 MG Tab Take 100 mg by mouth every day.     • venlafaxine XR (EFFEXOR XR) 75 MG CAPSULE SR 24 HR Take 1 Cap by mouth every day. 90 Cap 3   • aripiprazole (ABILIFY) 5 MG tablet Take 0.5 Tabs by mouth every day. 45 Tab 2   • hydrOXYzine HCl (ATARAX) 25 MG Tab Take 0.5-1 Tabs by mouth 3 times a day as needed for Itching. 90 Tab 0   • methocarbamol (ROBAXIN) 500 MG Tab Take 500 mg by mouth 3 times a day as needed.     • hydrOXYzine HCl (ATARAX) 25 MG Tab Take 25 mg by mouth.     • fluticasone (FLONASE) 50 MCG/ACT nasal spray 1  "Spray.     • Galcanezumab-gnlm (EMGALITY) 120 MG/ML Solution Auto-injector Inject 1 PEN as instructed Q 4 Weeks. 1 PEN 5   • predniSONE (DELTASONE) 10 MG Tab 6 TAB DAILY FOR 2 DAYS, THEN REDUCE BY 1 TAB EVERY OTHER DAY UNTIL GONE 45 Tab 0   • Ketorolac Tromethamine (SPRIX) 15.75 MG/SPRAY Solution Spray 1 Spray in nose every 6 hours as needed. 5 Each 11   • metoclopramide (REGLAN) 10 MG Tab TAKE 1 TO 3 TABS BY MOUTH AT ONSET OF HEADACHE/NAUSEA.REPEAT IN 4 TO 6 HOURS AS NEEDED 45 Tab 0   • ondansetron (ZOFRAN) 4 MG Tab tablet 1-2 tab every 4-6 hours prn for nausea 30 Tab 3   • promethazine (PHENERGAN) 25 MG Tab Take 1 Tab by mouth every 6 hours as needed for Nausea/Vomiting. 30 Tab 6   • omeprazole (PRILOSEC) 20 MG delayed-release capsule Take 20 mg by mouth every day.     • Diclofenac Sodium 1 % Gel Apply 2-4 g to skin as directed 4 times a day as needed. 1 Tube 2   • Methylsulfonylmethane (MSM PO) Take  by mouth.     • indomethacin (INDOCIN) 50 MG Cap Take 50 mg by mouth 2 Times a Day.     • Lactobacillus Acidophilus Powder Take  by mouth.     • montelukast (SINGULAIR) 10 MG Tab Take 4 mg by mouth.     • Cetirizine HCl 10 MG Cap Take 10 mg by mouth.     • azelastine (ASTELIN) 137 MCG/SPRAY nasal spray      • QVAR 80 MCG/ACT inhaler      • acetaminophen (TYLENOL) 500 MG TABS Take 500-1,000 mg by mouth every 6 hours as needed. Up to 8 a day     • Arginine 500 MG Cap      • benzonatate (TESSALON) 100 MG Cap Take 1 Cap by mouth 3 times a day as needed for Cough. (Patient not taking: Reported on 7/9/2020) 60 Cap 0   • MAGNESIUM PO Take  by mouth.       No current facility-administered medications for this visit.     \"CURRENT RX\"      Allergies: Ciprofloxacin; Compazine; Diclofenac; Epinephrine; and Latex      ROS  Unchanged from prior and positive for the sleep, respiratory, allergy, GI and chronic pain issues reviewed above.  She wears corrective lenses but has no diplopia.  She denies tinnitus or loss of auditory " "acuity, chest pain or palpitations, unusual cough or dyspnea.  She does have a regular menses and the migraine headaches noted above.  All other aspects of the CPT review of systems process are negative.      Physical Exam:   /80 (BP Location: Left arm, Patient Position: Sitting, BP Cuff Size: Adult)   Pulse 73   Resp 16   Ht 1.549 m (5' 1\")   Wt 77.6 kg (171 lb)   SpO2 97%   BMI 32.31 kg/m²    Limited by coronavirus precautions.  She is a well-developed, well-nourished woman who is alert, oriented and appropriately responsive.  She is not dyspneic and is not coughing.  She is wearing a mask.      Problems:  1. Obstructive sleep apnea syndrome  She has at least mild obstructive sleep apnea hypopnea with an apnea hypopnea index of 7.6 events per hour, rising to 23.5 in the supine body position and accompanied by a lowest arterial oxygen saturation of 89%.  With her significant daytime somnolence and frequent migraine type headaches effective treatment is required.  She has acclimated to the CPAP but have some troubles with consistent use when she was recovering from her recent ankle surgery. The data recorder confirms her history of a bit less than 5 hours of average sleep on CPAP nightly but this has been improving the last several weeks..  The AutoSet pressures seem appropriate and the residual apnea hypopnea index is low at 0.9 events per hour.  Her headaches have improved significantly.    2. Hypersomnolence  Related at least in part to the sleep disordered breathing.  Her sleep may also be fragmented by chronic pain.    3. Snoring  Resolved on CPAP.    4. Restless leg syndrome    5. Periodic limb movement disorder (PLMD)  The periodic limb movement with arousal index is 4.1 events per hour.  This may improve with treatment for sleep disordered breathing.  A serum ferritin level will was normal in November 2019. We have talked about specific medical therapy for restless leg syndrome and periodic limb " movement disorder and sleep but would like to see the effect of all night CPAP treatment before considering that therapy with its potential side effects.    6. Former smoker    7. BMI 32.0-32.9,adult      Plan:   1.  Continue auto titrating CPAP at 5 to 12 cm of water pressure.  She continues to strive for the goal of 7.5 hours of CPAP use nightly.    2.  Continue modafinil at 100 to 200 mg a day.  We have talked about alternative medications including solriamfetol.    3.  Follow-up here in 6 months, or sooner if new problems develop.  She will message us with any issues before then.    We appreciate the opportunity to assist in her care.    Total time for the visit today was 25 minutes.  More than half of that time was devoted to counseling the patient concerning the mechanics of sleep disordered breathing, the benefits of treatment in  improving daytime sleepiness and reducing the risk of future cardiac and neurologic events and in discussing treatment options.

## 2020-07-14 ENCOUNTER — APPOINTMENT (OUTPATIENT)
Dept: PHYSICAL THERAPY | Facility: REHABILITATION | Age: 40
End: 2020-07-14
Attending: NURSE PRACTITIONER
Payer: COMMERCIAL

## 2020-07-15 ENCOUNTER — PHYSICAL THERAPY (OUTPATIENT)
Dept: PHYSICAL THERAPY | Facility: REHABILITATION | Age: 40
End: 2020-07-15
Attending: NURSE PRACTITIONER
Payer: COMMERCIAL

## 2020-07-15 DIAGNOSIS — M25.371 OTHER INSTABILITY, RIGHT ANKLE: ICD-10-CM

## 2020-07-15 DIAGNOSIS — M25.571 PAIN IN RIGHT ANKLE AND JOINTS OF RIGHT FOOT: ICD-10-CM

## 2020-07-15 PROCEDURE — 97140 MANUAL THERAPY 1/> REGIONS: CPT

## 2020-07-15 PROCEDURE — 97110 THERAPEUTIC EXERCISES: CPT

## 2020-07-15 NOTE — OP THERAPY DAILY TREATMENT
"  Outpatient Physical Therapy  DAILY TREATMENT     [facility] Physical Therapy [address] [facility], [address]  [place] [address]  Phone:  [phone]    Date: 07/15/2020    Patient: Bree Gage  YOB: 1980  MRN: [id number]     Time Calculation    Start time: 0330  Stop time: 0405 Time Calculation (min): 35 minutes         Chief Complaint: Ankle Problem    Visit #: 6    SUBJECTIVE:  The patient reports getting her cam walking boot off and walking longer with less pain. She stepped over a \"cat\" toy on the floor and had a \"zinger\" for about 5-10 minutes but it slowly resolved.    OBJECTIVE:  Current objective measures:     (R) ankle AROM; balance and stability; decrease scar tissue connective tissue       Therapeutic Exercises (CPT 99945):     1. 1/2 foam inversion/eversion/PF/DF, 20 reps each direction     5. Anti gravity eversion off table edge, 2 x 15    6. Anti gravity inversion off table edge , 2 x 15    8. Step ups BOSU platform fwd & laterally, 2 x10 reps each    9. Plantar/dorsi/inv/ever with orange tb, 1 x10 reps each    10. Lunge    11. Tandem heel toe foam platform, 3 minutes    Therapeutic Treatments and Modalities:     1. Neuromuscular Re-education (CPT 85579), (R) LE  balance with PF/DF/Inv/Joleen    2. Manual Therapy (CPT 41639), R. foot, stw scar tissue reduction at anteromedial talus    3. Manual Therapy (CPT 78046), R. foot, effleurage     Time-based treatments/modalities:    Physical Therapy Timed Treatment Charges  Manual therapy minutes (CPT 45001): 12 minutes  Neuromusc re-ed, balance, coor, post minutes (CPT 11899): 8 minutes  Therapeutic exercise minutes (CPT 37994): 15 minutes    ASSESSMENT:   Response to treatment:   Performed manual techniques to calcaneal and subtalar joints; talocrural glides at grade 3; experienced no pain; tolerated well. IASTM with Hawk  tools; superior to talocrural increased keloid tissue; moderate pressure used to stretch tissue.    "   PLAN/RECOMMENDATIONS:   Plan for treatment: therapy treatment to continue next visit.  Planned interventions for next visit: continue with current treatment.

## 2020-07-20 ENCOUNTER — PATIENT MESSAGE (OUTPATIENT)
Dept: MEDICAL GROUP | Facility: MEDICAL CENTER | Age: 40
End: 2020-07-20

## 2020-07-20 RX ORDER — MONTELUKAST SODIUM 10 MG/1
4 TABLET ORAL DAILY
Qty: 90 TAB | Refills: 3 | Status: SHIPPED | OUTPATIENT
Start: 2020-07-20 | End: 2020-08-17 | Stop reason: SDUPTHER

## 2020-07-20 NOTE — TELEPHONE ENCOUNTER
From: Bree Gage  To: XUAN Ko  Sent: 7/20/2020 9:45 AM PDT  Subject: Prescription Question    My Pulmonologist is out until August and I am not planning on Going Back to Allergy & Asthma unless needed.    I ran out of my Singulair over the weekend and with the smoke in the air I am suffering an asthmatic cough.    Can you please fill my Singulair and Albuterol?    If I need to go back to allergy/asthma I will. They told me once a year I could be seen (last seen 10/2019) but they will not fill either.    I'd really like to have both.    [facility] use [facility] [address]

## 2020-07-20 NOTE — PATIENT COMMUNICATION
Received request via: Patient    Was the patient seen in the last year in this department? Yes    Does the patient have an active prescription (recently filled or refills available) for medication(s) requested? No     Requested Prescriptions     Pending Prescriptions Disp Refills   • montelukast (SINGULAIR) 10 MG Tab 30 Tab      Sig: Take 0.5 Tabs by mouth.     (pt also requests Albuterol unable to add to order for some reason. new RX?)

## 2020-07-22 ENCOUNTER — APPOINTMENT (OUTPATIENT)
Dept: PHYSICAL THERAPY | Facility: REHABILITATION | Age: 40
End: 2020-07-22
Attending: NURSE PRACTITIONER
Payer: COMMERCIAL

## 2020-07-23 RX ORDER — SULFAMETHOXAZOLE AND TRIMETHOPRIM 800; 160 MG/1; MG/1
1 TABLET ORAL 2 TIMES DAILY
Qty: 6 TAB | Refills: 0 | Status: SHIPPED | OUTPATIENT
Start: 2020-07-23 | End: 2020-10-09

## 2020-07-29 ENCOUNTER — TELEPHONE (OUTPATIENT)
Dept: NEUROLOGY | Facility: MEDICAL CENTER | Age: 40
End: 2020-07-29

## 2020-07-29 ENCOUNTER — APPOINTMENT (OUTPATIENT)
Dept: PHYSICAL THERAPY | Facility: REHABILITATION | Age: 40
End: 2020-07-29
Attending: NURSE PRACTITIONER
Payer: COMMERCIAL

## 2020-07-29 NOTE — TELEPHONE ENCOUNTER
Patient called on 7/28/2020 and was asking if she can get more samples of Ubrelvy and could come in for a Toradol shot.  Please advise.

## 2020-08-13 ENCOUNTER — PATIENT MESSAGE (OUTPATIENT)
Dept: MEDICAL GROUP | Facility: MEDICAL CENTER | Age: 40
End: 2020-08-13

## 2020-08-13 DIAGNOSIS — Z13.29 THYROID DISORDER SCREEN: ICD-10-CM

## 2020-08-13 DIAGNOSIS — M25.50 MULTIPLE JOINT PAIN: ICD-10-CM

## 2020-08-15 ENCOUNTER — PATIENT MESSAGE (OUTPATIENT)
Dept: MEDICAL GROUP | Facility: MEDICAL CENTER | Age: 40
End: 2020-08-15

## 2020-08-15 DIAGNOSIS — E55.9 VITAMIN D DEFICIENCY: ICD-10-CM

## 2020-08-15 NOTE — TELEPHONE ENCOUNTER
From: Bree Gage  To: XUAN Ko  Sent: 8/15/2020 3:41 AM PDT  Subject: Non-Urgent Medical Question    I very much appreciate it.   I forgot to add a Vitamin D as well please ;)      ----- Message -----   From:XUAN Ko   Sent:8/13/2020 3:47 PM PDT   To:Bree Gage   Subject:RE: Non-Urgent Medical Question    Sure, I'll add those labs.  Good job on the weight loss!      ----- Message -----   From:Bree Gage   Sent:8/13/2020 11:27 AM PDT   To:XUAN Ko   Subject:Non-Urgent Medical Question    I am scheduled for my [employer] early in September :)    May I please have a lab order for and RA factor again? The surgeon states I do have a bit of arthritis in my right foot and I am noticing strange aches and joint pains more than ever since my right ankle surgery.    Update - I am currently at 171 pounds and slowly going down!!! Since surgery, mobility is a bit better.     I'd also like to check my thyroid again please :)    Hope all is well!!

## 2020-08-16 ENCOUNTER — PATIENT MESSAGE (OUTPATIENT)
Dept: MEDICAL GROUP | Facility: MEDICAL CENTER | Age: 40
End: 2020-08-16

## 2020-08-17 RX ORDER — ALBUTEROL SULFATE 90 UG/1
2 AEROSOL, METERED RESPIRATORY (INHALATION) EVERY 4 HOURS PRN
Qty: 1 EACH | Refills: 5 | Status: SHIPPED | OUTPATIENT
Start: 2020-08-17

## 2020-08-17 RX ORDER — BENZONATATE 100 MG/1
100 CAPSULE ORAL 3 TIMES DAILY PRN
Qty: 60 CAP | Refills: 0 | Status: SHIPPED | OUTPATIENT
Start: 2020-08-17 | End: 2020-10-09

## 2020-08-17 RX ORDER — MONTELUKAST SODIUM 10 MG/1
10 TABLET ORAL DAILY
Qty: 90 TAB | Refills: 3 | Status: SHIPPED | OUTPATIENT
Start: 2020-08-17 | End: 2021-02-12

## 2020-08-17 NOTE — TELEPHONE ENCOUNTER
From: Bree Gage  To: XUAN Ko  Sent: 8/16/2020 4:14 PM PDT  Subject: Prescription Question    Nasty cough here again!  My last Singulair Prescription was written at half of a dose and I have been unable to pick it up . If you could please verify that that is q 10 mg QHS and please make sure [facility] on [address] [facility] that.    I really appreciate it!!  I'd also like to ask for a refill for my albuterol & tessalon as I have a nasty cough with the smoke in the air and being out of my medications I had to call in from work due to our company policy.  No fever :)   I have a virtual visit schedule for Tuesday morning if I can avoid this that would be wonderful please let me know as soon as possible.

## 2020-08-20 ENCOUNTER — HOSPITAL ENCOUNTER (OUTPATIENT)
Facility: MEDICAL CENTER | Age: 40
End: 2020-08-20
Attending: NURSE PRACTITIONER
Payer: COMMERCIAL

## 2020-08-20 ENCOUNTER — EH NON-PROVIDER (OUTPATIENT)
Dept: OCCUPATIONAL MEDICINE | Facility: CLINIC | Age: 40
End: 2020-08-20

## 2020-08-20 DIAGNOSIS — Z11.59 ENCOUNTER FOR SCREENING FOR OTHER VIRAL DISEASES: ICD-10-CM

## 2020-08-20 LAB
COVID ORDER STATUS COVID19: NORMAL
SARS-COV-2 RNA RESP QL NAA+PROBE: NOTDETECTED
SPECIMEN SOURCE: NORMAL

## 2020-08-20 PROCEDURE — U0003 INFECTIOUS AGENT DETECTION BY NUCLEIC ACID (DNA OR RNA); SEVERE ACUTE RESPIRATORY SYNDROME CORONAVIRUS 2 (SARS-COV-2) (CORONAVIRUS DISEASE [COVID-19]), AMPLIFIED PROBE TECHNIQUE, MAKING USE OF HIGH THROUGHPUT TECHNOLOGIES AS DESCRIBED BY CMS-2020-01-R: HCPCS | Mod: CS | Performed by: PREVENTIVE MEDICINE

## 2020-08-21 ENCOUNTER — TELEPHONE (OUTPATIENT)
Dept: OCCUPATIONAL MEDICINE | Facility: CLINIC | Age: 40
End: 2020-08-21

## 2020-08-21 NOTE — TELEPHONE ENCOUNTER
Phone Number Called: [phone]       Call outcome: Spoke to patient regarding message below.    Message: pt notified COVID results negative and to contact infection prevention

## 2020-08-27 ENCOUNTER — APPOINTMENT (OUTPATIENT)
Dept: NEUROLOGY | Facility: MEDICAL CENTER | Age: 40
End: 2020-08-27
Payer: COMMERCIAL

## 2020-09-01 DIAGNOSIS — G43.111 INTRACTABLE MIGRAINE WITH AURA WITH STATUS MIGRAINOSUS: ICD-10-CM

## 2020-09-04 RX ORDER — METOCLOPRAMIDE 10 MG/1
TABLET ORAL
Qty: 45 TAB | Refills: 0 | Status: SHIPPED | OUTPATIENT
Start: 2020-09-04 | End: 2021-01-19

## 2020-09-08 DIAGNOSIS — G47.10 HYPERSOMNOLENCE: ICD-10-CM

## 2020-09-08 RX ORDER — SOLRIAMFETOL 75 MG/1
75 TABLET, FILM COATED ORAL EVERY MORNING
Qty: 30 TAB | Refills: 1 | Status: SHIPPED | OUTPATIENT
Start: 2020-09-08 | End: 2020-10-08

## 2020-09-30 ENCOUNTER — PATIENT MESSAGE (OUTPATIENT)
Dept: MEDICAL GROUP | Facility: MEDICAL CENTER | Age: 40
End: 2020-09-30

## 2020-09-30 DIAGNOSIS — R73.09 ELEVATED GLUCOSE: ICD-10-CM

## 2020-09-30 DIAGNOSIS — E78.00 PURE HYPERCHOLESTEROLEMIA: ICD-10-CM

## 2020-09-30 DIAGNOSIS — G47.10 HYPERSOMNOLENCE: ICD-10-CM

## 2020-09-30 NOTE — TELEPHONE ENCOUNTER
Have we ever prescribed this med? Yes.  If yes, what date? 07/09/20 Dr. Irvin    Last OV: 07/09/20 Dr. Irvin  3.  Follow-up here in 6 months, or sooner if new problems develop.  She will message us with any issues before then.    Next OV: No Pending Appointment    DX: Hypersomnolence     Medications: modafinil (PROVIGIL) 100 MG Tab

## 2020-10-01 RX ORDER — MODAFINIL 100 MG/1
200 TABLET ORAL DAILY
Qty: 180 TAB | Refills: 0 | Status: SHIPPED | OUTPATIENT
Start: 2020-10-01 | End: 2020-11-23 | Stop reason: SDUPTHER

## 2020-10-02 ENCOUNTER — HOSPITAL ENCOUNTER (OUTPATIENT)
Dept: LAB | Facility: MEDICAL CENTER | Age: 40
End: 2020-10-02
Attending: NURSE PRACTITIONER
Payer: COMMERCIAL

## 2020-10-02 DIAGNOSIS — E78.00 PURE HYPERCHOLESTEROLEMIA: ICD-10-CM

## 2020-10-02 DIAGNOSIS — M25.50 MULTIPLE JOINT PAIN: ICD-10-CM

## 2020-10-02 DIAGNOSIS — Z13.29 THYROID DISORDER SCREEN: ICD-10-CM

## 2020-10-02 DIAGNOSIS — R73.09 ELEVATED GLUCOSE: ICD-10-CM

## 2020-10-02 DIAGNOSIS — E55.9 VITAMIN D DEFICIENCY: ICD-10-CM

## 2020-10-02 LAB
25(OH)D3 SERPL-MCNC: 29 NG/ML (ref 30–100)
ALBUMIN SERPL BCP-MCNC: 4.8 G/DL (ref 3.2–4.9)
ALBUMIN/GLOB SERPL: 1.8 G/DL
ALP SERPL-CCNC: 56 U/L (ref 30–99)
ALT SERPL-CCNC: 7 U/L (ref 2–50)
ANION GAP SERPL CALC-SCNC: 13 MMOL/L (ref 7–16)
AST SERPL-CCNC: 17 U/L (ref 12–45)
BILIRUB SERPL-MCNC: 0.4 MG/DL (ref 0.1–1.5)
BUN SERPL-MCNC: 19 MG/DL (ref 8–22)
CALCIUM SERPL-MCNC: 9.3 MG/DL (ref 8.5–10.5)
CHLORIDE SERPL-SCNC: 100 MMOL/L (ref 96–112)
CHOLEST SERPL-MCNC: 169 MG/DL (ref 100–199)
CO2 SERPL-SCNC: 24 MMOL/L (ref 20–33)
CREAT SERPL-MCNC: 0.68 MG/DL (ref 0.5–1.4)
FASTING STATUS PATIENT QL REPORTED: NORMAL
GLOBULIN SER CALC-MCNC: 2.7 G/DL (ref 1.9–3.5)
GLUCOSE SERPL-MCNC: 96 MG/DL (ref 65–99)
HDLC SERPL-MCNC: 62 MG/DL
LDLC SERPL CALC-MCNC: 93 MG/DL
POTASSIUM SERPL-SCNC: 3.7 MMOL/L (ref 3.6–5.5)
PROT SERPL-MCNC: 7.5 G/DL (ref 6–8.2)
RHEUMATOID FACT SER IA-ACNC: <10 IU/ML (ref 0–14)
SODIUM SERPL-SCNC: 137 MMOL/L (ref 135–145)
TRIGL SERPL-MCNC: 70 MG/DL (ref 0–149)
TSH SERPL DL<=0.005 MIU/L-ACNC: 1.5 UIU/ML (ref 0.38–5.33)

## 2020-10-02 PROCEDURE — 36415 COLL VENOUS BLD VENIPUNCTURE: CPT

## 2020-10-02 PROCEDURE — 80053 COMPREHEN METABOLIC PANEL: CPT

## 2020-10-02 PROCEDURE — 84443 ASSAY THYROID STIM HORMONE: CPT

## 2020-10-02 PROCEDURE — 86431 RHEUMATOID FACTOR QUANT: CPT

## 2020-10-02 PROCEDURE — 82306 VITAMIN D 25 HYDROXY: CPT

## 2020-10-02 PROCEDURE — 80061 LIPID PANEL: CPT

## 2020-10-09 ENCOUNTER — APPOINTMENT (OUTPATIENT)
Dept: MEDICAL GROUP | Facility: MEDICAL CENTER | Age: 40
End: 2020-10-09
Payer: COMMERCIAL

## 2020-10-09 ENCOUNTER — OFFICE VISIT (OUTPATIENT)
Dept: NEUROLOGY | Facility: MEDICAL CENTER | Age: 40
End: 2020-10-09
Payer: COMMERCIAL

## 2020-10-09 VITALS
HEIGHT: 61 IN | TEMPERATURE: 98.7 F | BODY MASS INDEX: 32.05 KG/M2 | WEIGHT: 169.75 LBS | SYSTOLIC BLOOD PRESSURE: 108 MMHG | OXYGEN SATURATION: 97 % | HEART RATE: 90 BPM | DIASTOLIC BLOOD PRESSURE: 68 MMHG | RESPIRATION RATE: 16 BRPM

## 2020-10-09 DIAGNOSIS — G43.119 BASILAR ARTERY MIGRAINE, INTRACTABLE: Primary | ICD-10-CM

## 2020-10-09 DIAGNOSIS — Z23 NEED FOR VACCINATION: ICD-10-CM

## 2020-10-09 PROCEDURE — 99213 OFFICE O/P EST LOW 20 MIN: CPT | Performed by: PSYCHIATRY & NEUROLOGY

## 2020-10-09 RX ORDER — MELOXICAM 15 MG/1
TABLET ORAL
COMMUNITY
Start: 2020-10-01 | End: 2021-03-16 | Stop reason: SDUPTHER

## 2020-10-09 ASSESSMENT — ENCOUNTER SYMPTOMS
DIZZINESS: 1
HEADACHES: 1

## 2020-10-09 NOTE — PROGRESS NOTES
"Subjective:      Bree Gage is a 39 y.o. female who presents for 6-month follow-up, with a history of basilar artery migraine.     HPI    Still on Emgality 120 mg injections every 4 weeks, Bree continues to have a very good degree of headache control.  She can have milder headaches between the severe attacks, but she averages maybe 7 headaches in a monthly interval for which rescue was required.  Unfortunately, she has failed triptan's as a class, Sprix nasal spray was eventually obtained but this simply caused GI upset with little benefit for her headaches.  She is now on Mobic 15 mg for arthritic pains, she is wondering if this is safe with the headaches.  She uses Motrin otherwise with the headache itself.    Medical, surgical and family histories are reviewed, there are no new drug allergies.  She now has her CPAP which is helped her ROME, she is also lost about 20 pounds over the last 6 months.  Other than the Motrin and Emgality 120 mg injections, she is on Reglan 10 mg as needed, Mobic 15 mg daily, Abilify, Effexor XR, Atarax, Robaxin, Singulair, and Prilosec.    Review of Systems   Neurological: Positive for dizziness and headaches.   All other systems reviewed and are negative.       Objective:     /68 (BP Location: Right arm, Patient Position: Sitting, BP Cuff Size: Adult)   Pulse 90   Temp 37.1 °C (98.7 °F) (Temporal)   Resp 16   Ht 1.549 m (5' 1\")   Wt 77 kg (169 lb 12.1 oz)   SpO2 97%   BMI 32.07 kg/m²      Physical Exam    She appears in no acute distress.  Vital signs are stable.  There is no malar rash or jaw claudication.  Her neck is supple.  Cardiac evaluation is unremarkable.    In quick and cursory fashion, with observation, mental status, cranial nerve, musculoskeletal and coordination evaluations are intact.     Assessment/Plan:     1. Basilar artery migraine, intractable  We will continue to the Emgality unchanged, she has continued to have a very good response, even " though she is having maybe 6 headaches in a month that rescue medication is required, the headaches are not as severe, and it was better than the greater than 18 days/month where headache rescue was required.  For rescue, Ubrelvy 100 mg tablets will be used, samples were provided.  A new CGRP rescue medication, safe to use in a patient already on a CGRP maintenance therapy, these are well-tolerated and effective, helping not just headache pain but also associated symptoms.  She will call the office to update.  Other medications in the drug class include Nurtec-ODT and Reyvow.  Phone contact in the interim, we will follow-up in 6 weeks otherwise.    - NON SPECIFIED; Ubrelvy 100 mg tab. 1 tab at headache onset; repeat once in  2-4 hour  Dispense: 3 Each; Refill: 0    Time: 20-minute spent face-to-face for exam, review, discussion, and education, of this over 50% of the time spent counseling and coordinating care.

## 2020-10-19 DIAGNOSIS — G43.111 INTRACTABLE MIGRAINE WITH AURA WITH STATUS MIGRAINOSUS: ICD-10-CM

## 2020-10-19 RX ORDER — GALCANEZUMAB 120 MG/ML
INJECTION, SOLUTION SUBCUTANEOUS
Qty: 1 EACH | Refills: 5 | Status: SHIPPED | OUTPATIENT
Start: 2020-10-19 | End: 2021-01-19

## 2020-10-19 RX ORDER — BENZONATATE 100 MG/1
CAPSULE ORAL
Qty: 60 CAP | Refills: 0 | Status: SHIPPED | OUTPATIENT
Start: 2020-10-19 | End: 2020-12-14

## 2020-10-19 NOTE — TELEPHONE ENCOUNTER
Received request via: Pharmacy    Was the patient seen in the last year in this department? Yes    Does the patient have an active prescription (recently filled or refills available) for medication(s) requested? No     Requested Prescriptions     Pending Prescriptions Disp Refills   • benzonatate (TESSALON) 100 MG Cap [Pharmacy Med Name: BENZONATATE 100 MG CAPSULE] 60 Cap 0     Sig: TAKE 1 CAPSULE BY MOUTH THREE TIMES A DAY AS NEEDED FOR COUGH

## 2020-10-22 ENCOUNTER — OFFICE VISIT (OUTPATIENT)
Dept: MEDICAL GROUP | Facility: MEDICAL CENTER | Age: 40
End: 2020-10-22
Payer: COMMERCIAL

## 2020-10-22 VITALS
SYSTOLIC BLOOD PRESSURE: 108 MMHG | WEIGHT: 167 LBS | HEART RATE: 73 BPM | TEMPERATURE: 98.2 F | OXYGEN SATURATION: 97 % | BODY MASS INDEX: 31.53 KG/M2 | HEIGHT: 61 IN | RESPIRATION RATE: 16 BRPM | DIASTOLIC BLOOD PRESSURE: 62 MMHG

## 2020-10-22 DIAGNOSIS — J45.20 MILD INTERMITTENT ASTHMA IN ADULT WITHOUT COMPLICATION: ICD-10-CM

## 2020-10-22 DIAGNOSIS — F33.1 MODERATE EPISODE OF RECURRENT MAJOR DEPRESSIVE DISORDER (HCC): ICD-10-CM

## 2020-10-22 DIAGNOSIS — G43.119 BASILAR ARTERY MIGRAINE, INTRACTABLE: ICD-10-CM

## 2020-10-22 DIAGNOSIS — G47.33 OSA ON CPAP: ICD-10-CM

## 2020-10-22 DIAGNOSIS — R79.89 LOW VITAMIN D LEVEL: ICD-10-CM

## 2020-10-22 DIAGNOSIS — K58.1 IRRITABLE BOWEL SYNDROME WITH CONSTIPATION: ICD-10-CM

## 2020-10-22 PROCEDURE — 99214 OFFICE O/P EST MOD 30 MIN: CPT | Performed by: NURSE PRACTITIONER

## 2020-10-22 NOTE — ASSESSMENT & PLAN NOTE
Chronic issue managed by neurology. Some improvement in frequency and intensity since having started Emgality.  Estimates that she is having 1 to 2 days of migraine weekly rather than every day that she experienced in the past.

## 2020-10-23 PROBLEM — R79.89 LOW VITAMIN D LEVEL: Status: ACTIVE | Noted: 2020-10-23

## 2020-10-23 PROBLEM — G47.33 OSA ON CPAP: Status: ACTIVE | Noted: 2020-10-23

## 2020-10-23 NOTE — ASSESSMENT & PLAN NOTE
Reports that she is doing very well at this time on Abilify 2.5 mg daily and venlafaxine 75 mg daily.  No present concerns with depression, anxiety

## 2020-10-23 NOTE — ASSESSMENT & PLAN NOTE
"Stable and doing well at this time on Singulair.  Rarely needing albuterol.  She is no longer on an ICS.  She does tend to have a \"tickle cough\" with irritation in the back of the throat that is most noticeable at night or first thing in the morning.  Questions if this may be related to her CPAP.  Denies any shortness of breath, chronic sore throat, uncontrolled heartburn, significant nasal congestion or postnasal drip  "

## 2020-10-23 NOTE — ASSESSMENT & PLAN NOTE
Reports consistent use of CPAP.  Boyfriend notes improvement in her snoring.  She does still tend to wake frequently, toss and turn.  Her pulmonologist has left and she is working on establishing with a new provider.  She also notes a slight tickle in her throat causing her to cough at times when using CPAP or when she first wakes in the morning.

## 2020-10-23 NOTE — ASSESSMENT & PLAN NOTE
Alternating constipation and diarrhea although the constipation seems to be more persistent.  She had tried MiraLAX daily but then was having persistent diarrhea, she is having a hard time finding a balance.  She is on several medications that may contribute to constipation.  Eating a healthy diet, states she is getting plenty of fruits and vegetables.  She is exercising regularly, drinking plenty of fluids.  No acute abdominal pain, blood or mucus in stool

## 2020-10-23 NOTE — PROGRESS NOTES
"Subjective:     Chief Complaint   Patient presents with   • Follow-Up     labs      Bree Gage is a 39 y.o. female here today to follow up on:    Basilar artery migraine, intractable  Chronic issue managed by neurology. Some improvement in frequency and intensity since having started Emgality.  Estimates that she is having 1 to 2 days of migraine weekly rather than every day that she experienced in the past.    Asthma in adult without complication  Stable and doing well at this time on Singulair.  Rarely needing albuterol.  She is no longer on an ICS.  She does tend to have a \"tickle cough\" with irritation in the back of the throat that is most noticeable at night or first thing in the morning.  Questions if this may be related to her CPAP.  Denies any shortness of breath, chronic sore throat, uncontrolled heartburn, significant nasal congestion or postnasal drip    Moderate episode of recurrent major depressive disorder (HCC)  Reports that she is doing very well at this time on Abilify 2.5 mg daily and venlafaxine 75 mg daily.  No present concerns with depression, anxiety    ROME on CPAP  Reports consistent use of CPAP.  Boyfriend notes improvement in her snoring.  She does still tend to wake frequently, toss and turn.  Her pulmonologist has left and she is working on establishing with a new provider.  She also notes a slight tickle in her throat causing her to cough at times when using CPAP or when she first wakes in the morning.    IBS (Irritable Bowel Syndrome)  Alternating constipation and diarrhea although the constipation seems to be more persistent.  She had tried MiraLAX daily but then was having persistent diarrhea, she is having a hard time finding a balance.  She is on several medications that may contribute to constipation.  Eating a healthy diet, states she is getting plenty of fruits and vegetables.  She is exercising regularly, drinking plenty of fluids.  No acute abdominal pain, blood or " mucus in stool    Low vitamin D level  Consistently taking supplement of 2000 international units daily vitamin D still slightly low at 29       Current medicines (including changes today)  Current Outpatient Medications   Medication Sig Dispense Refill   • benzonatate (TESSALON) 100 MG Cap TAKE 1 CAPSULE BY MOUTH THREE TIMES A DAY AS NEEDED FOR COUGH 60 Cap 0   • EMGALITY 120 MG/ML Solution Auto-injector INJECT 1 PEN AS INSTRUCTED EVERY 4 WEEKS 1 Each 5   • meloxicam (MOBIC) 15 MG tablet TAKE 1 TAB BY MOUTH DAILY FOR 2 WEEKS,THEN 1 TAB DAILY AS NEEDED     • NON SPECIFIED Ubrelvy 100 mg tab. 1 tab at headache onset; repeat once in  2-4 hour 3 Each 0   • modafinil (PROVIGIL) 100 MG Tab Take 2 Tabs by mouth every day for 90 days. 180 Tab 0   • metoclopramide (REGLAN) 10 MG Tab TAKE 1-3 TABS BY MOUTH AT ONSET OF HEADACHE/NAUSEA.REPEAT IN 4 TO 6 HOURS AS NEEDED 45 Tab 0   • montelukast (SINGULAIR) 10 MG Tab Take 1 Tab by mouth every day. 90 Tab 3   • albuterol 108 (90 Base) MCG/ACT Aero Soln inhalation aerosol Inhale 2 Puffs by mouth every four hours as needed for Shortness of Breath. 1 Each 5   • venlafaxine XR (EFFEXOR XR) 75 MG CAPSULE SR 24 HR Take 1 Cap by mouth every day. 90 Cap 3   • aripiprazole (ABILIFY) 5 MG tablet Take 0.5 Tabs by mouth every day. 45 Tab 2   • hydrOXYzine HCl (ATARAX) 25 MG Tab Take 25 mg by mouth.     • fluticasone (FLONASE) 50 MCG/ACT nasal spray 1 Spray.     • omeprazole (PRILOSEC) 20 MG delayed-release capsule Take 20 mg by mouth every day.     • Diclofenac Sodium 1 % Gel Apply 2-4 g to skin as directed 4 times a day as needed. 1 Tube 2   • Methylsulfonylmethane (MSM PO) Take  by mouth.     • indomethacin (INDOCIN) 50 MG Cap Take 50 mg by mouth 2 Times a Day.     • Lactobacillus Acidophilus Powder Take  by mouth.     • Cetirizine HCl 10 MG Cap Take 10 mg by mouth.     • azelastine (ASTELIN) 137 MCG/SPRAY nasal spray      • MAGNESIUM PO Take  by mouth.     • acetaminophen (TYLENOL) 500 MG  "TABS Take 500-1,000 mg by mouth every 6 hours as needed. Up to 8 a day       No current facility-administered medications for this visit.      She  has a past medical history of Alcohol addiction ([facility]), Allergy, Angina (last 2 years), Apnea, sleep, Arthritis (current), Back pain, Bronchitis, Chickenpox, Constipation, Daytime sleepiness, Depression, Dizziness, Drug addiction ([facility]), EBV positive mononucleosis syndrome, Eye pain, Fatigue, Fever, Fracture of finger, Frequent headaches, Gasping for breath, GERD (gastroesophageal reflux disease), Hemorrhoids, Hyperlipidemia, Hypothyroidism, IBS (irritable bowel syndrome), Indigestion, Influenza, Iron deficiency anemia, LBP (low back pain), Morning headache, Painful joint, Rectal fissure, Snoring, Sore muscles, Varicose veins, and Wears glasses. She also has no past medical history of Arrhythmia, Cancer ([facility]), CATARACT, Congestive heart failure ([facility]), COPD, Diabetes, Dialysis, Glaucoma, Heart murmur, Heart valve disease, Hypertension, Jaundice, Myocardial infarct ([facility]), Other specified symptom associated with female genital organs, Pacemaker, Personal history of venous thrombosis and embolism, Pneumonia, Renal disorder, Rheumatic fever, Seizure ([facility]), Stroke ([facility]), Unspecified hemorrhagic conditions, or Unspecified urinary incontinence.    ROS included above     Objective:     /62 (BP Location: Left arm, Patient Position: Sitting, BP Cuff Size: Adult)   Pulse 73   Temp 36.8 °C (98.2 °F) (Temporal)   Resp 16   Ht 1.549 m (5' 1\")   Wt 75.8 kg (167 lb)   SpO2 97%  Body mass index is 31.55 kg/m².     Physical Exam:  General: Alert, oriented in no acute distress.  Eye contact is good, speech is normal, affect calm  Lungs: clear to auscultation bilaterally, normal effort, no wheeze/ rhonchi/ rales.  CV: regular rate and rhythm, S1, S2, no murmur  Abdomen: soft, nontender  Ext: no edema, color normal, vascularity normal, temperature normal    Assessment and Plan: " "  The following treatment plan was discussed   1. Basilar artery migraine, intractable   frequency and intensity has improved with Emgality, she will continue follow-up with neurology   2. Mild intermittent asthma in adult without complication   stable at this time, rarely needing albuterol.  She has noted a dry \"tickle\" in her throat causing her to cough at times.  She does not appreciate any heartburn (controlled on medication), no significant postnasal drip.  Possible allergy related.  She will continue follow-up with pulmonology   3. Moderate episode of recurrent major depressive disorder (HCC)   stable and doing well   4. ROME on CPAP   she is working on establishing with a new pulmonologist   5. Irritable bowel syndrome with constipation   continues to struggle with constipation and occasional diarrhea.  She has found that MiraLAX daily pushed her more towards diarrhea.  Encouraged trial of every other day or half dose daily, continue healthy diet, regular exercise, adequate fluid intake   6. Low vitamin D level   increase vitamin D supplement by 1000 international units daily       Followup: As needed         Please note that this dictation was created using voice recognition software. I have worked with consultants from the vendor as well as technical experts from [employer] to optimize the interface. I have made every reasonable attempt to correct obvious errors, but I expect that there are errors of grammar and possibly content that I did not discover before finalizing the note.       "

## 2020-10-23 NOTE — ASSESSMENT & PLAN NOTE
Consistently taking supplement of 2000 international units daily vitamin D still slightly low at 29

## 2020-11-06 ENCOUNTER — HOSPITAL ENCOUNTER (OUTPATIENT)
Facility: MEDICAL CENTER | Age: 40
End: 2020-11-06
Attending: NURSE PRACTITIONER
Payer: COMMERCIAL

## 2020-11-06 ENCOUNTER — EH NON-PROVIDER (OUTPATIENT)
Dept: OCCUPATIONAL MEDICINE | Facility: CLINIC | Age: 40
End: 2020-11-06

## 2020-11-06 DIAGNOSIS — Z11.59 ENCOUNTER FOR SCREENING FOR OTHER VIRAL DISEASES: ICD-10-CM

## 2020-11-06 PROCEDURE — U0003 INFECTIOUS AGENT DETECTION BY NUCLEIC ACID (DNA OR RNA); SEVERE ACUTE RESPIRATORY SYNDROME CORONAVIRUS 2 (SARS-COV-2) (CORONAVIRUS DISEASE [COVID-19]), AMPLIFIED PROBE TECHNIQUE, MAKING USE OF HIGH THROUGHPUT TECHNOLOGIES AS DESCRIBED BY CMS-2020-01-R: HCPCS

## 2020-11-06 PROCEDURE — U0003 INFECTIOUS AGENT DETECTION BY NUCLEIC ACID (DNA OR RNA); SEVERE ACUTE RESPIRATORY SYNDROME CORONAVIRUS 2 (SARS-COV-2) (CORONAVIRUS DISEASE [COVID-19]), AMPLIFIED PROBE TECHNIQUE, MAKING USE OF HIGH THROUGHPUT TECHNOLOGIES AS DESCRIBED BY CMS-2020-01-R: HCPCS | Mod: CS | Performed by: NURSE PRACTITIONER

## 2020-11-07 LAB — COVID ORDER STATUS COVID19: NORMAL

## 2020-11-08 LAB
SARS-COV-2 RNA RESP QL NAA+PROBE: NOTDETECTED
SPECIMEN SOURCE: NORMAL

## 2020-11-09 ENCOUNTER — TELEPHONE (OUTPATIENT)
Dept: OCCUPATIONAL MEDICINE | Facility: CLINIC | Age: 40
End: 2020-11-09

## 2020-11-09 NOTE — TELEPHONE ENCOUNTER
Notified pt. of NEGATIVE COVID-19 test. Advised pt. not to return to work and to call employee screening line at [phone] for further instructions.

## 2020-11-10 ENCOUNTER — OFFICE VISIT (OUTPATIENT)
Dept: URGENT CARE | Facility: PHYSICIAN GROUP | Age: 40
End: 2020-11-10
Payer: COMMERCIAL

## 2020-11-10 VITALS
SYSTOLIC BLOOD PRESSURE: 138 MMHG | RESPIRATION RATE: 16 BRPM | DIASTOLIC BLOOD PRESSURE: 92 MMHG | BODY MASS INDEX: 31.72 KG/M2 | HEART RATE: 70 BPM | WEIGHT: 168 LBS | TEMPERATURE: 97.2 F | OXYGEN SATURATION: 99 % | HEIGHT: 61 IN

## 2020-11-10 DIAGNOSIS — J98.01 BRONCHOSPASM: ICD-10-CM

## 2020-11-10 DIAGNOSIS — R05.9 COUGH: ICD-10-CM

## 2020-11-10 DIAGNOSIS — R19.7 DIARRHEA, UNSPECIFIED TYPE: ICD-10-CM

## 2020-11-10 DIAGNOSIS — R11.0 NAUSEA: ICD-10-CM

## 2020-11-10 LAB
FLUAV+FLUBV AG SPEC QL IA: NEGATIVE
INT CON NEG: NORMAL
INT CON POS: NORMAL

## 2020-11-10 PROCEDURE — 99214 OFFICE O/P EST MOD 30 MIN: CPT | Performed by: PHYSICIAN ASSISTANT

## 2020-11-10 PROCEDURE — 87804 INFLUENZA ASSAY W/OPTIC: CPT | Performed by: PHYSICIAN ASSISTANT

## 2020-11-10 RX ORDER — DOXYCYCLINE HYCLATE 100 MG
100 TABLET ORAL 2 TIMES DAILY
Qty: 14 TAB | Refills: 0 | Status: SHIPPED | OUTPATIENT
Start: 2020-11-10 | End: 2020-11-17

## 2020-11-10 ASSESSMENT — ENCOUNTER SYMPTOMS
ABDOMINAL PAIN: 0
VOMITING: 0
SINUS PAIN: 1
SHORTNESS OF BREATH: 0
FATIGUE: 1
FEVER: 1
SPUTUM PRODUCTION: 0
DIARRHEA: 1
WHEEZING: 1
COUGH: 1
SORE THROAT: 0
NAUSEA: 1
CHILLS: 0

## 2020-11-10 NOTE — PROGRESS NOTES
Subjective:   Bree Gage  is a 39 y.o. female who presents for Fatigue (cough, headaches, body aches, [id number] )      Fatigue  This is a new problem. The current episode started in the past 7 days. Associated symptoms include congestion, coughing, fatigue, a fever (last night) and nausea. Pertinent negatives include no abdominal pain, chills, rash, sore throat or vomiting.   Patient comes to clinic with constellation of symptoms beginning about 1 week ago.  She describes onset of diarrhea with headaches, suspected food poisoning with nausea and a few episodes of vomiting.  She states she has been taking Reglan with mild improved symptoms but still persistent nausea.  She did have a fever yesterday she measured at 102.  She denies loss of taste or smell.  She denies sore throat or ear pain.  She complains of sinus congestive pressure as well as some chest congestion with coughing.  She notes cough has been rather dry with some bronchospastic coughing as well.  Patient did have a negative Covid test resulted late last week (around 4 days ago).  She has tried her MDI with significant improvement with wheezing.  She states she is taken some Tylenol through the day today.  She complains of mild fatigue symptoms as well.  She notes past medical history of H1N1 and influenza and requests testing today.  Notes past medical history of sinusitis bronchitis and pneumonia.  She additionally mentions some mild left-sided abdominal discomfort associated with diarrhea.    Review of Systems   Constitutional: Positive for fatigue and fever (last night). Negative for chills.   HENT: Positive for congestion and sinus pain. Negative for ear pain and sore throat.    Respiratory: Positive for cough and wheezing. Negative for sputum production and shortness of breath.    Gastrointestinal: Positive for diarrhea and nausea. Negative for abdominal pain and vomiting.   Skin: Negative for rash.       Allergies   Allergen  "Reactions   • Ciprofloxacin    • Compazine Unspecified     agitation   • Diclofenac      Upset     • Epinephrine      palpitations   • Latex         Objective:   /92   Pulse 70   Temp 36.2 °C (97.2 °F) (Temporal)   Resp 16   Ht 1.549 m (5' 1\")   Wt 76.2 kg (168 lb)   SpO2 99%   BMI 31.74 kg/m²     Physical Exam  Vitals signs and nursing note reviewed.   Constitutional:       General: She is not in acute distress.     Appearance: She is well-developed. She is not diaphoretic.   HENT:      Head: Normocephalic and atraumatic.      Right Ear: Tympanic membrane, ear canal and external ear normal.      Left Ear: Tympanic membrane, ear canal and external ear normal.      Nose:      Right Sinus: Frontal sinus tenderness present. No maxillary sinus tenderness.      Left Sinus: Frontal sinus tenderness present. No maxillary sinus tenderness.      Mouth/Throat:      Lips: Pink.      Mouth: Mucous membranes are moist.      Pharynx: Uvula midline. Posterior oropharyngeal erythema ( mild PND) present. No oropharyngeal exudate.      Tonsils: No tonsillar abscesses.   Eyes:      General: Lids are normal. No scleral icterus.        Right eye: No discharge.         Left eye: No discharge.      Conjunctiva/sclera: Conjunctivae normal.   Neck:      Musculoskeletal: Neck supple.   Pulmonary:      Effort: Pulmonary effort is normal. No respiratory distress.      Breath sounds: No stridor. Wheezing ( trace) present. No decreased breath sounds, rhonchi or rales.   Musculoskeletal: Normal range of motion.   Lymphadenopathy:      Cervical: Cervical adenopathy ( mild bilat) present.   Skin:     General: Skin is warm and dry.      Coloration: Skin is not pale.      Findings: No erythema.   Neurological:      Mental Status: She is alert and oriented to person, place, and time. She is not disoriented.   Psychiatric:         Speech: Speech normal.         Behavior: Behavior normal.     POCT flu - NEG    Assessment/Plan:   1. Cough  - " POCT Influenza A/B  - doxycycline (VIBRAMYCIN) 100 MG Tab; Take 1 Tab by mouth 2 times a day for 7 days.  Dispense: 14 Tab; Refill: 0    2. Bronchospasm    3. Nausea    4. Diarrhea, unspecified type  Supportive care is reviewed with patient/caregiver - recommend to push PO fluids and electrolytes, Nsaids/tylenol, netti pot/saline irrig, humidifier in home, flonase,  take full course of Rx, take with probiotics, observe for resolution  Return to clinic with lack of resolution or progression of symptoms.  ER precautions with any worsening symptoms are reviewed with patient/caregiver and they do express understanding    Patient just had a negative Covid test-we will cover with Doxy now for possible bacterial sinusitis versus upper respiratory infection sent with work note excusing him from missed work yesterday and today.  We discussed CDC guidelines with symptom resolution for return to work recommendations.        I have worn an N95 mask, gloves and eye protection for the entire encounter with this patient.     Differential diagnosis, natural history, supportive care, and indications for immediate follow-up discussed.

## 2020-11-10 NOTE — LETTER
November 10, 2020       Patient: Bree Gage   YOB: 1980   Date of Visit: 11/10/2020         To Whom It May Concern:    In my medical opinion, I recommend that Bree Gage should be excused from missed work for today and tomorrow due to illness.    If you have any questions or concerns, please don't hesitate to call [phone]          Sincerely,          Aric Landaverde P.A.-C.  Electronically Signed

## 2020-11-19 ENCOUNTER — PATIENT MESSAGE (OUTPATIENT)
Dept: MEDICAL GROUP | Facility: MEDICAL CENTER | Age: 40
End: 2020-11-19

## 2020-11-20 ENCOUNTER — PATIENT MESSAGE (OUTPATIENT)
Dept: NEUROLOGY | Facility: MEDICAL CENTER | Age: 40
End: 2020-11-20

## 2020-11-20 DIAGNOSIS — G43.119 BASILAR ARTERY MIGRAINE, INTRACTABLE: ICD-10-CM

## 2020-11-20 RX ORDER — UBROGEPANT 100 MG/1
1 TABLET ORAL PRN
Qty: 10 TAB | Refills: 5 | Status: SHIPPED | OUTPATIENT
Start: 2020-11-20 | End: 2021-01-19

## 2020-11-23 ENCOUNTER — PATIENT MESSAGE (OUTPATIENT)
Dept: MEDICAL GROUP | Facility: MEDICAL CENTER | Age: 40
End: 2020-11-23

## 2020-11-23 DIAGNOSIS — G47.10 HYPERSOMNOLENCE: ICD-10-CM

## 2020-11-23 NOTE — TELEPHONE ENCOUNTER
Have we ever prescribed this med? Yes.  If yes, what date? 10/1/2020    Last OV: 07/09/2020    Next OV: Due 12/01/2020, None scheduled.    DX: Hypersomnolence    Medications: Modafinil 100mg

## 2020-11-24 RX ORDER — MODAFINIL 100 MG/1
200 TABLET ORAL DAILY
Qty: 180 TAB | Refills: 0 | Status: SHIPPED | OUTPATIENT
Start: 2020-11-24 | End: 2021-01-05 | Stop reason: SDUPTHER

## 2020-11-25 ENCOUNTER — TELEMEDICINE (OUTPATIENT)
Dept: MEDICAL GROUP | Facility: MEDICAL CENTER | Age: 40
End: 2020-11-25
Payer: COMMERCIAL

## 2020-11-25 VITALS
WEIGHT: 169.2 LBS | DIASTOLIC BLOOD PRESSURE: 82 MMHG | OXYGEN SATURATION: 96 % | HEIGHT: 61 IN | BODY MASS INDEX: 31.95 KG/M2 | SYSTOLIC BLOOD PRESSURE: 116 MMHG | TEMPERATURE: 98.3 F | HEART RATE: 76 BPM

## 2020-11-25 DIAGNOSIS — B34.9 RECURRENT VIRAL INFECTION: ICD-10-CM

## 2020-11-25 DIAGNOSIS — Z87.898 HISTORY OF UNEXPLAINED FEVER: ICD-10-CM

## 2020-11-25 DIAGNOSIS — R53.82 CHRONIC FATIGUE: ICD-10-CM

## 2020-11-25 DIAGNOSIS — R21 RASH: ICD-10-CM

## 2020-11-25 PROCEDURE — 99214 OFFICE O/P EST MOD 30 MIN: CPT | Mod: 95,CR | Performed by: NURSE PRACTITIONER

## 2020-11-29 NOTE — PROGRESS NOTES
"Telemedicine: Established Patient   This evaluation was conducted via [employer] using secure and encrypted videoconferencing technology. The patient was in a private location in the state of [place].    The patient's identity was confirmed and verbal consent was obtained for this virtual visit.    Subjective:   CC:   Chief Complaint   Patient presents with   • Lab Results   • Requesting Labs         Bree Gage is a 39 y.o. female established patient with h/o chronic migraine, chronic fatigue, ROME and hypothyroidism here requesting lab work to evaluate for autoimmune disorder or immunodeficiency.  She states she has \"always been sick\" with one viral illness after another dating back to childhood. She has struggled with chronic migraine and chronic fatigue for many years. She additionally reports recurrent rashes, particularly surround the mouth. She initially thought this to be related to food allergies but has not been able to pin point a trigger. She states she is also very sun sensitive and will develop a rash if out in the sun for any period of time, even in cooler weather. She reports having low grade fevers intermittently, thought these may be r/t her migraines.  ROS positive for muscle/ joint pain, dry/ itchy eyes  No family history of autoimmune disorder. No prior evaluation that she is aware of or available in EMR  No present joint swelling or erythema, fever, rash. No recurrent bacterial infection or recent hospitalization        ROS      Allergies   Allergen Reactions   • Ciprofloxacin    • Compazine Unspecified     agitation   • Diclofenac      Upset     • Epinephrine      palpitations   • Latex        Current medicines (including changes today)  Current Outpatient Medications   Medication Sig Dispense Refill   • modafinil (PROVIGIL) 100 MG Tab Take 2 Tabs by mouth every day for 90 days. 180 Tab 0   • Ubrogepant (UBRELVY) 100 MG Tab Take 1 Tab by mouth as needed. 1 tab at headache onset; repeat in " 2 hours prn 10 Tab 5   • benzonatate (TESSALON) 100 MG Cap TAKE 1 CAPSULE BY MOUTH THREE TIMES A DAY AS NEEDED FOR COUGH (Patient not taking: Reported on 11/10/2020) 60 Cap 0   • EMGALITY 120 MG/ML Solution Auto-injector INJECT 1 PEN AS INSTRUCTED EVERY 4 WEEKS 1 Each 5   • meloxicam (MOBIC) 15 MG tablet TAKE 1 TAB BY MOUTH DAILY FOR 2 WEEKS,THEN 1 TAB DAILY AS NEEDED     • NON SPECIFIED Ubrelvy 100 mg tab. 1 tab at headache onset; repeat once in  2-4 hour 3 Each 0   • metoclopramide (REGLAN) 10 MG Tab TAKE 1-3 TABS BY MOUTH AT ONSET OF HEADACHE/NAUSEA.REPEAT IN 4 TO 6 HOURS AS NEEDED 45 Tab 0   • montelukast (SINGULAIR) 10 MG Tab Take 1 Tab by mouth every day. 90 Tab 3   • albuterol 108 (90 Base) MCG/ACT Aero Soln inhalation aerosol Inhale 2 Puffs by mouth every four hours as needed for Shortness of Breath. 1 Each 5   • venlafaxine XR (EFFEXOR XR) 75 MG CAPSULE SR 24 HR Take 1 Cap by mouth every day. 90 Cap 3   • aripiprazole (ABILIFY) 5 MG tablet Take 0.5 Tabs by mouth every day. 45 Tab 2   • hydrOXYzine HCl (ATARAX) 25 MG Tab Take 25 mg by mouth.     • fluticasone (FLONASE) 50 MCG/ACT nasal spray 1 Spray.     • omeprazole (PRILOSEC) 20 MG delayed-release capsule Take 20 mg by mouth every day.     • Diclofenac Sodium 1 % Gel Apply 2-4 g to skin as directed 4 times a day as needed. 1 Tube 2   • Methylsulfonylmethane (MSM PO) Take  by mouth.     • indomethacin (INDOCIN) 50 MG Cap Take 50 mg by mouth 2 Times a Day.     • Lactobacillus Acidophilus Powder Take  by mouth.     • Cetirizine HCl 10 MG Cap Take 10 mg by mouth.     • azelastine (ASTELIN) 137 MCG/SPRAY nasal spray      • MAGNESIUM PO Take  by mouth.     • acetaminophen (TYLENOL) 500 MG TABS Take 500-1,000 mg by mouth every 6 hours as needed. Up to 8 a day       No current facility-administered medications for this visit.        Patient Active Problem List    Diagnosis Date Noted   • ROME on CPAP 10/23/2020   • Low vitamin D level 10/23/2020   • Obesity (BMI  30-39.9) 08/30/2019   • History of fracture of right ankle 06/13/2019   • Numbness of right hand 04/17/2019   • Elevated glucose 06/22/2018   • Obesity, Class I, BMI 30.0-34.9 (see actual BMI) 05/23/2018   • Moderate episode of recurrent major depressive disorder (HCC) 05/23/2018   • Asthma in adult without complication 05/23/2018   • Basilar artery migraine, intractable 10/11/2017   • Chronic migraine 10/11/2017   • EEG abnormal 10/11/2017   • Hyperprolactinemia (HCC) 02/13/2013   • Fatigue 09/03/2009   • Preventative health care 09/03/2009   • IBS (irritable bowel syndrome)    • History of alcohol abuse    • History of methamphetamine abuse (HCC)    • History of hypothyroidism    • Hemorrhoids    • Rectal fissure    • Varicose veins    • Iron deficiency anemia    • Hyperlipidemia    • Low back pain    • EBV positive mononucleosis syndrome        Family History   Problem Relation Age of Onset   • Hypertension Mother    • Hyperlipidemia Mother    • Alcohol/Drug Mother    • Psychiatric Illness Mother    • Hypertension Father    • Hyperlipidemia Father    • Alcohol/Drug Father    • Psychiatric Illness Father    • Cancer Maternal Grandfather    • Heart Disease Maternal Grandfather    • Heart Disease Paternal Grandfather    • Alcohol/Drug Paternal Grandfather        She  has a past medical history of Alcohol addiction ([facility]), Allergy, Angina (last 2 years), Apnea, sleep, Arthritis (current), Back pain, Bronchitis, Chickenpox, Constipation, Daytime sleepiness, Depression, Dizziness, Drug addiction (HCC), EBV positive mononucleosis syndrome, Eye pain, Fatigue, Fever, Fracture of finger, Frequent headaches, Gasping for breath, GERD (gastroesophageal reflux disease), Hemorrhoids, Hyperlipidemia, Hypothyroidism, IBS (irritable bowel syndrome), Indigestion, Influenza, Iron deficiency anemia, LBP (low back pain), Morning headache, Painful joint, Rectal fissure, Snoring, Sore muscles, Varicose veins, and Wears glasses. She also  "has no past medical history of Arrhythmia, Cancer (HCC), CATARACT, Congestive heart failure (HCC), COPD, Diabetes, Dialysis, Glaucoma, Heart murmur, Heart valve disease, Hypertension, Jaundice, Myocardial infarct (HCC), Other specified symptom associated with female genital organs, Pacemaker, Personal history of venous thrombosis and embolism, Pneumonia, Renal disorder, Rheumatic fever, Seizure (HCC), Stroke (HCC), Unspecified hemorrhagic conditions, or Unspecified urinary incontinence.  She  has a past surgical history that includes tracheostomy (10/29/2009) and gastrostomy laparoscopic (10/29/2009).       Objective:   /82   Pulse 76   Temp 36.8 °C (98.3 °F)   Ht 1.549 m (5' 1\")   Wt 76.7 kg (169 lb 3.2 oz)   SpO2 96%   BMI 31.97 kg/m²     Physical Exam  Physical Exam:  Constitutional: Alert, no distress, well-groomed.  Skin: No rashes in visible areas.  Eye: Round. Conjunctiva clear, lids normal. No icterus.   ENMT: Lips pink without lesions, good dentition, moist mucous membranes. Phonation normal.  Neck: No masses, no thyromegaly. Moves freely without pain.  CV: Pulse as reported by patient  Respiratory: Unlabored respiratory effort, no cough or audible wheeze  Psych: Alert and oriented x3, normal affect and mood.     Assessment and Plan:   The following treatment plan was discussed:     1. Chronic migraine  2. Chronic fatigue  - CRP QUANTITIVE (NON-CARDIAC); Future  - Sed Rate; Future  - LOYD REFLEXIVE PROFILE; Future  - THYROID PEROXIDASE  (TPO) AB; Future  - CCP ANTIBODY; Future  - DSDNA AB, IGG W/RFLX TO IFA TITER; Future  - C3+C4+COMPT  - CONNECTIVE TISSUE DISEASES PROFILE; Future  - MPO/TX-3 (ANCA) ABS; Future  - ANTITHYROGLOBULIN AB; Future  3. Recurrent viral infection  - CBC WITH DIFFERENTIAL; Future  - IMMUNOGLOBULINS A/G/M SERUM; Future  - IGG SERUM QUANT; Future  - T CELL+B CELL NSD; Future  4. History of unexplained fever  5. Rash    Pt presents today to discuss chronic medical issues and " requesting further lab evaluation to r/u underlying autoimmune disorder or immunodeficiency. Aside from prior RF and Sed rate she has not had autoimmune workup. Evaluate labs as listed above, f/u pending results      Follow-up: pending labs

## 2020-11-30 ENCOUNTER — HOSPITAL ENCOUNTER (OUTPATIENT)
Dept: LAB | Facility: MEDICAL CENTER | Age: 40
End: 2020-11-30
Attending: NURSE PRACTITIONER
Payer: COMMERCIAL

## 2020-11-30 DIAGNOSIS — R53.82 CHRONIC FATIGUE: ICD-10-CM

## 2020-11-30 DIAGNOSIS — B34.9 RECURRENT VIRAL INFECTION: ICD-10-CM

## 2020-11-30 LAB
BASOPHILS # BLD AUTO: 0.4 % (ref 0–1.8)
BASOPHILS # BLD: 0.02 K/UL (ref 0–0.12)
C3 SERPL-MCNC: 93.1 MG/DL (ref 87–200)
C4 SERPL-MCNC: 8.4 MG/DL (ref 19–52)
CRP SERPL HS-MCNC: 0.03 MG/DL (ref 0–0.75)
EOSINOPHIL # BLD AUTO: 0.24 K/UL (ref 0–0.51)
EOSINOPHIL NFR BLD: 5.1 % (ref 0–6.9)
ERYTHROCYTE [DISTWIDTH] IN BLOOD BY AUTOMATED COUNT: 44.3 FL (ref 35.9–50)
ERYTHROCYTE [SEDIMENTATION RATE] IN BLOOD BY WESTERGREN METHOD: 0 MM/HOUR (ref 0–20)
HCT VFR BLD AUTO: 40.5 % (ref 37–47)
HGB BLD-MCNC: 12.7 G/DL (ref 12–16)
IMM GRANULOCYTES # BLD AUTO: 0.01 K/UL (ref 0–0.11)
IMM GRANULOCYTES NFR BLD AUTO: 0.2 % (ref 0–0.9)
LYMPHOCYTES # BLD AUTO: 1.8 K/UL (ref 1–4.8)
LYMPHOCYTES NFR BLD: 38 % (ref 22–41)
MCH RBC QN AUTO: 29.3 PG (ref 27–33)
MCHC RBC AUTO-ENTMCNC: 31.4 G/DL (ref 33.6–35)
MCV RBC AUTO: 93.3 FL (ref 81.4–97.8)
MONOCYTES # BLD AUTO: 0.42 K/UL (ref 0–0.85)
MONOCYTES NFR BLD AUTO: 8.9 % (ref 0–13.4)
NEUTROPHILS # BLD AUTO: 2.25 K/UL (ref 2–7.15)
NEUTROPHILS NFR BLD: 47.4 % (ref 44–72)
NRBC # BLD AUTO: 0 K/UL
NRBC BLD-RTO: 0 /100 WBC
PLATELET # BLD AUTO: 268 K/UL (ref 164–446)
PMV BLD AUTO: 10.6 FL (ref 9–12.9)
RBC # BLD AUTO: 4.34 M/UL (ref 4.2–5.4)
THYROPEROXIDASE AB SERPL-ACNC: 22 IU/ML (ref 0–9)
WBC # BLD AUTO: 4.7 K/UL (ref 4.8–10.8)

## 2020-11-30 PROCEDURE — 85025 COMPLETE CBC W/AUTO DIFF WBC: CPT

## 2020-11-30 PROCEDURE — 86376 MICROSOMAL ANTIBODY EACH: CPT

## 2020-11-30 PROCEDURE — 86355 B CELLS TOTAL COUNT: CPT

## 2020-11-30 PROCEDURE — 82784 ASSAY IGA/IGD/IGG/IGM EACH: CPT

## 2020-11-30 PROCEDURE — 83516 IMMUNOASSAY NONANTIBODY: CPT | Mod: 91

## 2020-11-30 PROCEDURE — 86200 CCP ANTIBODY: CPT

## 2020-11-30 PROCEDURE — 86160 COMPLEMENT ANTIGEN: CPT

## 2020-11-30 PROCEDURE — 86225 DNA ANTIBODY NATIVE: CPT

## 2020-11-30 PROCEDURE — 86800 THYROGLOBULIN ANTIBODY: CPT

## 2020-11-30 PROCEDURE — 86038 ANTINUCLEAR ANTIBODIES: CPT

## 2020-11-30 PROCEDURE — 86235 NUCLEAR ANTIGEN ANTIBODY: CPT

## 2020-11-30 PROCEDURE — 85652 RBC SED RATE AUTOMATED: CPT

## 2020-11-30 PROCEDURE — 86359 T CELLS TOTAL COUNT: CPT

## 2020-11-30 PROCEDURE — 86360 T CELL ABSOLUTE COUNT/RATIO: CPT

## 2020-11-30 PROCEDURE — 86357 NK CELLS TOTAL COUNT: CPT

## 2020-11-30 PROCEDURE — 36415 COLL VENOUS BLD VENIPUNCTURE: CPT

## 2020-11-30 PROCEDURE — 86162 COMPLEMENT TOTAL (CH50): CPT

## 2020-11-30 PROCEDURE — 86140 C-REACTIVE PROTEIN: CPT

## 2020-12-01 LAB — THYROGLOB AB SERPL-ACNC: 0.9 IU/ML (ref 0–4)

## 2020-12-02 ENCOUNTER — PATIENT MESSAGE (OUTPATIENT)
Dept: MEDICAL GROUP | Facility: MEDICAL CENTER | Age: 40
End: 2020-12-02

## 2020-12-02 LAB
ANNOTATION COMMENT IMP: NORMAL
CCP IGG SERPL-ACNC: 3 UNITS (ref 0–19)
CD19 CELLS NFR SPEC: 14 % (ref 6–23)
CD3 CELLS # BLD: 1279 CELLS/UL (ref 570–2400)
CD3 CELLS NFR SPEC: 67 % (ref 62–87)
CD3+CD4+ CELLS # BLD: 893 CELLS/UL (ref 430–1800)
CD3+CD4+ CELLS NFR BLD: 47 % (ref 32–64)
CD3+CD4+ CELLS/CD3+CD8+ CLL BLD: 2.47 RATIO (ref 0.8–3.9)
CD3+CD8+ CELLS # BLD: 370 CELLS/UL (ref 210–1200)
CD3+CD8+ CELLS NFR SPEC: 19 % (ref 15–46)
CD3-CD16+CD56+ CELLS # SPEC: 340 CELLS/UL (ref 78–470)
CD3-CD16+CD56+ CELLS NFR SPEC: 18 % (ref 4–26)
CELLS.CD3-CD19+ [#/VOLUME] IN BLOOD: 259 CELLS/UL (ref 91–610)
CENTROMERE IGG TITR SER IF: 2 AU/ML (ref 0–40)
CH50 SERPL-ACNC: 55.2 U/ML (ref 38.7–89.9)
DSDNA AB TITR SER CLIF: NORMAL {TITER}
ENA JO1 AB TITR SER: 0 AU/ML (ref 0–40)
ENA SCL70 IGG SER QL: 7 AU/ML (ref 0–40)
ENA SM IGG SER-ACNC: 1 AU/ML (ref 0–40)
ENA SS-B IGG SER IA-ACNC: 1 AU/ML (ref 0–40)
IGA SERPL-MCNC: 380 MG/DL (ref 68–408)
IGG SERPL-MCNC: 736 MG/DL (ref 768–1632)
IGM SERPL-MCNC: 66 MG/DL (ref 35–263)
MYELOPEROXIDASE AB SER-ACNC: 2 AU/ML (ref 0–19)
PROTEINASE3 AB SER-ACNC: 1 AU/ML (ref 0–19)
RIBOSOMAL P AB SER-ACNC: 4 AU/ML (ref 0–40)
SSA52 R0ENA AB IGG Q0420: 2 AU/ML (ref 0–40)
SSA60 R0ENA AB IGG Q0419: 0 AU/ML (ref 0–40)
U1 SNRNP IGG SER QL: 4 AU/ML (ref 0–40)

## 2020-12-03 DIAGNOSIS — R76.8 LOW SERUM IGG FOR AGE: ICD-10-CM

## 2020-12-03 DIAGNOSIS — R77.8 LOW SERUM COMPLEMENT C4: ICD-10-CM

## 2020-12-03 LAB — NUCLEAR IGG SER QL IA: NORMAL

## 2020-12-11 ENCOUNTER — APPOINTMENT (OUTPATIENT)
Dept: DERMATOLOGY | Facility: IMAGING CENTER | Age: 40
End: 2020-12-11
Payer: COMMERCIAL

## 2020-12-11 ENCOUNTER — PATIENT MESSAGE (OUTPATIENT)
Dept: MEDICAL GROUP | Facility: MEDICAL CENTER | Age: 40
End: 2020-12-11

## 2020-12-14 ENCOUNTER — PATIENT MESSAGE (OUTPATIENT)
Dept: MEDICAL GROUP | Facility: MEDICAL CENTER | Age: 40
End: 2020-12-14

## 2020-12-14 RX ORDER — BENZONATATE 100 MG/1
CAPSULE ORAL
Qty: 60 CAP | Refills: 0 | Status: SHIPPED | OUTPATIENT
Start: 2020-12-14 | End: 2021-03-04

## 2020-12-15 NOTE — TELEPHONE ENCOUNTER
"From: Bree Gage  To: XUAN Ko  Sent: 12/14/2020 8:38 AM PST  Subject: Non-Urgent Medical Question    I plan to wrap it today with K tape - with help from a friend. I iced it on and off all weekend and it is definitely worse. I have never done a Nerve study before. It feels sprained in the wrist, index and pinky. My fiance has noticed I curl up both of my hands at night. It is touch to get a blood pressure today as it is terribly sore.      ----- Message -----   From:XUAN Ko   Sent:12/14/2020 6:36 AM PST   To:Bree Gage   Subject:RE: Non-Urgent Medical Question    Nate Giron,  Have you tried wearing a wrist brace? Sometimes it helps to wear it at night as you may be positioning your wrist in ways that are making it worse while sleeping.  Have you had a nerve conduction test? I am not remembering discussing that.  Gillian CHEEMA      ----- Message -----   From:Bree Gage   Sent:12/11/2020 7:53 AM PST   To:XUAN Ko   Subject:Non-Urgent Medical Question    Its been two weeks now with left hand pain - very odd for sure & no known trauma. Index finger, thumb and wrist are involved with bruising, redness, weakness and cramping. It feels sprained and I am waking at night with pain. I have taken my meloxicam and tylenol with no luck. I'm not sure if its \"another flare\" or something I need to talk to neurology about. Any advice?  "

## 2020-12-20 DIAGNOSIS — Z23 NEED FOR VACCINATION: ICD-10-CM

## 2020-12-23 RX ORDER — PREDNISONE 10 MG/1
TABLET ORAL
Qty: 45 TAB | Refills: 0 | Status: SHIPPED | OUTPATIENT
Start: 2020-12-23 | End: 2021-04-19

## 2021-01-04 RX ORDER — SULFAMETHOXAZOLE AND TRIMETHOPRIM 800; 160 MG/1; MG/1
TABLET ORAL
Qty: 6 TAB | Refills: 0 | Status: SHIPPED | OUTPATIENT
Start: 2021-01-04 | End: 2021-01-19

## 2021-01-05 DIAGNOSIS — G47.10 HYPERSOMNOLENCE: ICD-10-CM

## 2021-01-06 RX ORDER — MODAFINIL 100 MG/1
200 TABLET ORAL DAILY
Qty: 180 TAB | Refills: 0 | Status: SHIPPED | OUTPATIENT
Start: 2021-01-06 | End: 2021-01-19

## 2021-01-06 NOTE — TELEPHONE ENCOUNTER
Received request via: Pharmacy [facility].     Was the patient seen in the last year in this department? Yes  7/19/20 by Dr. Drake Irvin    Does the patient have an active prescription (recently filled or refills available) for medication(s) requested? No     modafinil (PROVIGIL) 100 MG Tab 180 Tab 0/0 11/24/2020 2/22/2021    Sig - Route: Take 2 Tabs by mouth every day for 90 days. - Oral    Sent to pharmacy as: Modafinil 100 MG Oral Tablet (Provigil)      Routed to Dr. Olmedo for review

## 2021-01-13 ENCOUNTER — HOSPITAL ENCOUNTER (OUTPATIENT)
Facility: MEDICAL CENTER | Age: 41
End: 2021-01-13
Attending: PREVENTIVE MEDICINE
Payer: COMMERCIAL

## 2021-01-13 ENCOUNTER — EH NON-PROVIDER (OUTPATIENT)
Dept: OCCUPATIONAL MEDICINE | Facility: CLINIC | Age: 41
End: 2021-01-13

## 2021-01-13 DIAGNOSIS — Z11.59 ENCOUNTER FOR SCREENING FOR OTHER VIRAL DISEASES: Primary | ICD-10-CM

## 2021-01-13 DIAGNOSIS — Z11.59 ENCOUNTER FOR SCREENING FOR OTHER VIRAL DISEASES: ICD-10-CM

## 2021-01-13 PROCEDURE — U0003 INFECTIOUS AGENT DETECTION BY NUCLEIC ACID (DNA OR RNA); SEVERE ACUTE RESPIRATORY SYNDROME CORONAVIRUS 2 (SARS-COV-2) (CORONAVIRUS DISEASE [COVID-19]), AMPLIFIED PROBE TECHNIQUE, MAKING USE OF HIGH THROUGHPUT TECHNOLOGIES AS DESCRIBED BY CMS-2020-01-R: HCPCS | Performed by: PREVENTIVE MEDICINE

## 2021-01-19 ENCOUNTER — PATIENT MESSAGE (OUTPATIENT)
Dept: MEDICAL GROUP | Facility: MEDICAL CENTER | Age: 41
End: 2021-01-19

## 2021-01-19 ENCOUNTER — OFFICE VISIT (OUTPATIENT)
Dept: SLEEP MEDICINE | Facility: MEDICAL CENTER | Age: 41
End: 2021-01-19
Payer: COMMERCIAL

## 2021-01-19 VITALS
OXYGEN SATURATION: 95 % | WEIGHT: 164 LBS | DIASTOLIC BLOOD PRESSURE: 68 MMHG | HEART RATE: 75 BPM | SYSTOLIC BLOOD PRESSURE: 128 MMHG | BODY MASS INDEX: 30.96 KG/M2 | RESPIRATION RATE: 16 BRPM | HEIGHT: 61 IN

## 2021-01-19 DIAGNOSIS — R40.0 DAYTIME SOMNOLENCE: ICD-10-CM

## 2021-01-19 DIAGNOSIS — Z78.9 NONSMOKER: ICD-10-CM

## 2021-01-19 DIAGNOSIS — G47.33 OSA ON CPAP: ICD-10-CM

## 2021-01-19 DIAGNOSIS — G25.81 RLS (RESTLESS LEGS SYNDROME): ICD-10-CM

## 2021-01-19 DIAGNOSIS — J45.20 MILD INTERMITTENT ASTHMA IN ADULT WITHOUT COMPLICATION: ICD-10-CM

## 2021-01-19 PROCEDURE — 99214 OFFICE O/P EST MOD 30 MIN: CPT | Performed by: NURSE PRACTITIONER

## 2021-01-19 RX ORDER — MODAFINIL 200 MG/1
200 TABLET ORAL DAILY
Qty: 30 TAB | Refills: 1 | Status: SHIPPED | OUTPATIENT
Start: 2021-01-19 | End: 2021-03-24

## 2021-01-19 RX ORDER — MODAFINIL 100 MG/1
TABLET ORAL
Qty: 30 TAB | Refills: 1 | Status: SHIPPED | OUTPATIENT
Start: 2021-01-19 | End: 2021-03-20

## 2021-01-19 RX ORDER — PRAMIPEXOLE DIHYDROCHLORIDE 0.12 MG/1
0.12 TABLET ORAL NIGHTLY PRN
Qty: 30 TAB | Refills: 1 | Status: SHIPPED | OUTPATIENT
Start: 2021-01-19 | End: 2021-02-16

## 2021-01-19 ASSESSMENT — FIBROSIS 4 INDEX: FIB4 SCORE: 0.94

## 2021-01-19 NOTE — PROGRESS NOTES
"Chief Complaint   Patient presents with   • Apnea     Last Seen 7/9/2020        HPI:  Bree Gage is a 40 y.o. year old female here today for follow-up on ROME and daytime somnolence.  Last OV 7/9/20 with Dr. Irvin; please see his note for full history.     PMH: fibromyalgia, migraines and chronic fatigue. Prior hospitalization 2009 for pneumonia with respiratory failure requiring tracheostomy and ARF.    Symptoms date back to age of 14. Prior presentation of sleep symptoms with snoring and daytime somnolence.  PSG 10/19/17 noted AHI 7.2/hr and O2 mira 91%. MSLT noted sleep onset latency of 9min and 26 second with no sleep onset REM periods. She was treated with Provigil with improvement in symptoms.  PSG 11/3/19 noted AHI 7.6/hr and supine AHi 23.5/hr with O2 mira 89%. She was started on APAP 5-12cm with improvement in symptoms. Current device obtained 11/2019.  Compliance report 12/20/20-1/18/21 notes 80% compliance, avg nightly use of 4.5hrs and minimal/moderate mask leak, mean pressure 9.7cm with reduced AHI 1.2/hr. Reviewed with patient; need for consistent nightly use to better control symptoms. Prior compliance also notes avg 4hr 41min of use with AHI 0.9/hr.  She remains on modafinil 200mg daily but has ongoing fatigue and recently underwent fatigue serology with mild low IgG and C4 levels; PCP referred her to immunology for review. She has tried sunosi 75mg in the past but was not titrated to 150mg dose; it was not beneficial.  Recent COVID-19 test was negative 1/13/21. She notes some worsening of asthma in last 6 mos and using PARUL 1-2x's per day with feeling of \"out of breath\". She is pendng f/u with Dr. Reyes to assess asthma/allergies.  She notes dry cough that can cause tussive episodes and nild PND and possible silent reflux. She uses prilosec prn only.  She notes having periodic limb movement issues and does \"flail\" her legs at night. She notes some cramping of legs at night and some " neuropathy to bilateral feet.    ROS: As per HPI and otherwise negative if not stated.    Past Medical History:   Diagnosis Date   • Alcohol addiction ([facility])    • Allergy    • Angina last 2 years    pt attributes to asthma   • Apnea, sleep    • Arthritis current    osteoarthritis   • Back pain    • Bronchitis     4-5x/year   • Chickenpox    • Constipation    • Daytime sleepiness    • Depression    • Dizziness    • Drug addiction ([facility])    • EBV positive mononucleosis syndrome    • Eye pain    • Fatigue    • Fever    • Fracture of finger     rt index x 3   • Frequent headaches    • Gasping for breath    • GERD (gastroesophageal reflux disease)    • Hemorrhoids    • Hyperlipidemia    • Hypothyroidism    • IBS (irritable bowel syndrome)    • Indigestion    • Influenza    • Iron deficiency anemia    • LBP (low back pain)     si joint epidural 2008   • Morning headache    • Painful joint    • Rectal fissure    • Snoring    • Sore muscles    • Varicose veins    • Wears glasses        Past Surgical History:   Procedure Laterality Date   • TRACHEOSTOMY  10/29/2009    Performed by NIDHI LARA at SURGERY [facility]   • GASTROSTOMY LAPAROSCOPIC  10/29/2009    Performed by NIDHI LARA at SURGERY [facility]       Family History   Problem Relation Age of Onset   • Hypertension Mother    • Hyperlipidemia Mother    • Alcohol/Drug Mother    • Psychiatric Illness Mother    • Hypertension Father    • Hyperlipidemia Father    • Alcohol/Drug Father    • Psychiatric Illness Father    • Cancer Maternal Grandfather    • Heart Disease Maternal Grandfather    • Heart Disease Paternal Grandfather    • Alcohol/Drug Paternal Grandfather        Social History     Socioeconomic History   • Marital status:      Spouse name: Not on file   • Number of children: Not on file   • Years of education: Not on file   • Highest education level: Not on file   Occupational History   • Not on file   Social Needs   • Financial  resource strain: Not on file   • Food insecurity     Worry: Not on file     Inability: Not on file   • Transportation needs     Medical: Not on file     Non-medical: Not on file   Tobacco Use   • Smoking status: Former Smoker     Packs/day: 0.50     Years: 19.00     Pack years: 9.50     Types: Cigarettes     Start date: 1998     Quit date: 8/15/2015     Years since quittin.4   • Smokeless tobacco: Never Used   • Tobacco comment: Quit 2015   Substance and Sexual Activity   • Alcohol use: Yes     Alcohol/week: 0.0 oz     Frequency: 2-3 times a week     Drinks per session: 1 or 2     Binge frequency: Never     Comment: one or two a week   • Drug use: Not Currently     Types: Methamphetamines     Comment: clean since   Rehab ,  speed   • Sexual activity: Yes     Partners: Male     Birth control/protection: Pill   Lifestyle   • Physical activity     Days per week: Not on file     Minutes per session: Not on file   • Stress: Not on file   Relationships   • Social connections     Talks on phone: Not on file     Gets together: Not on file     Attends [employer] service: Not on file     Active member of club or organization: Not on file     Attends meetings of clubs or organizations: Not on file     Relationship status: Not on file   • Intimate partner violence     Fear of current or ex partner: Not on file     Emotionally abused: Not on file     Physically abused: Not on file     Forced sexual activity: Not on file   Other Topics Concern   •  Service No   • Blood Transfusions Yes   • Caffeine Concern No   • Occupational Exposure Yes   • Hobby Hazards No   • Sleep Concern No   • Stress Concern No   • Weight Concern Yes   • Special Diet No   • Back Care No   • Exercise No   • Bike Helmet No   • Seat Belt Yes   • Self-Exams Yes   Social History Narrative   • Not on file       Allergies as of 2021 - Reviewed 2021   Allergen Reaction Noted   • Ciprofloxacin  2010   • Compazine  "Unspecified 01/09/2018   • Diclofenac  09/03/2009   • Epinephrine  09/03/2009   • Latex  09/03/2009        Vitals:  /68 (BP Location: Left arm, Patient Position: Sitting, BP Cuff Size: Adult)   Pulse 75   Resp 16   Ht 1.549 m (5' 1\")   Wt 74.4 kg (164 lb)   SpO2 95%     Current medications as of today   Current Outpatient Medications   Medication Sig Dispense Refill   • modafinil (PROVIGIL) 100 MG Tab Take 2 Tabs by mouth every day for 90 days. 180 Tab 0   • predniSONE (DELTASONE) 10 MG Tab 6 TAB DAILY FOR 2 DAYS, THEN REDUCE BY 1 TAB EVERY OTHER DAY UNTIL GONE 45 Tab 0   • benzonatate (TESSALON) 100 MG Cap TAKE 1 CAPSULE BY MOUTH THREE TIMES A DAY AS NEEDED FOR COUGH 60 Cap 0   • meloxicam (MOBIC) 15 MG tablet TAKE 1 TAB BY MOUTH DAILY FOR 2 WEEKS,THEN 1 TAB DAILY AS NEEDED     • montelukast (SINGULAIR) 10 MG Tab Take 1 Tab by mouth every day. 90 Tab 3   • albuterol 108 (90 Base) MCG/ACT Aero Soln inhalation aerosol Inhale 2 Puffs by mouth every four hours as needed for Shortness of Breath. 1 Each 5   • venlafaxine XR (EFFEXOR XR) 75 MG CAPSULE SR 24 HR Take 1 Cap by mouth every day. 90 Cap 3   • aripiprazole (ABILIFY) 5 MG tablet Take 0.5 Tabs by mouth every day. 45 Tab 2   • hydrOXYzine HCl (ATARAX) 25 MG Tab Take 25 mg by mouth.     • omeprazole (PRILOSEC) 20 MG delayed-release capsule Take 20 mg by mouth every day.     • Diclofenac Sodium 1 % Gel Apply 2-4 g to skin as directed 4 times a day as needed. 1 Tube 2   • Lactobacillus Acidophilus Powder Take  by mouth.     • Cetirizine HCl 10 MG Cap Take 10 mg by mouth.     • azelastine (ASTELIN) 137 MCG/SPRAY nasal spray      • acetaminophen (TYLENOL) 500 MG TABS Take 500-1,000 mg by mouth every 6 hours as needed. Up to 8 a day       No current facility-administered medications for this visit.          Physical Exam:   Gen:           Alert and oriented, No apparent distress. Mood and affect appropriate, normal interaction with examiner.  Eyes:        "   PERRL, EOM intact, sclere white, conjunctive moist.  Ears:          Not examined.   Hearing:     Grossly intact.  Nose:          Normal, no lesions or deformities.  Dentition:    mask  Oropharynx:   mask  Mallampati Classification: mask  Neck:        Supple, trachea midline, no masses.  Respiratory Effort: No intercostal retractions or use of accessory muscles.   Lung Auscultation:      Clear to auscultation bilaterally; no rales, rhonchi or wheezing.  CV:            Regular rate and rhythm. No murmurs, rubs or gallops.  Abd:           Not examined.   Lymphadenopathy: Neck examined; negative findings.  Gait and Station: Normal.  Digits and Nails: No clubbing, cyanosis, petechiae, or nodes.   Cranial Nerves: II-XII grossly intact.  Skin:        No rashes, lesions or ulcers noted.               Ext:           No cyanosis or edema.      Assessment:  1. ROME on CPAP     2. Daytime somnolence     3. BMI 30.0-30.9,adult  Height And Weight   4. Nonsmoker     5. RLS  6. Asthma, intermittent    Immunizations:    Flu:10/2020  Pneumovax 23:not due  Prevnar 13:not due    Plan:  Patient was seen for 45 minutes, more than 50% of time spent in face to face review, counseling, and arranging future evaluation and follow up of medical conditions and care related to history of symptoms/asthma/RLS/. Patient is clinically stable and will proceed with following plan. Answered all patient questions to their satisfaction.      1.  Patient has ongoing chronic fatigue and sleep issues dating back to the age of 14 years old.  She is using CPAP nightly but could improve her length of time used in consistency.  She has had missed nights.  We reviewed how this will further increase her somnolence during the day.  We will continue with her current pressure but we will perform CNOX in the future to rule out ongoing hypoxia at night on treatment; may send results via [facility].  2.  Due to ongoing fatigue and significant improvement with use of  modafinil we will continue this 200 mg every morning and will also provide 100 mg for as needed use between the hours of 1 PM and 3 PM if overly fatigued.  Patient understands to not use this on a routine basis and that higher doses are not necessarily more effective than the current dose she is on.  3.  Encourage weight loss through diet and exercise.  4.  Follow-up with Dr. Reyes for management of asthma/allergies.  Patient has a chronic history of migraine/headaches and may benefit from stopping Singulair due to common side effect of headache.  Reviewed with patient.  Continue albuterol HFA inhaler as needed.  5.  Patient has ongoing cough especially postnasal drip and silent reflux.  Start Prilosec every morning.  Continue current sinus sprays as needed.  6. Due to ongoing RLS symptoms with normal ferritin levels in the past, start mirapex 0.125mg qhs and may titrate after 7 days to 0.25mg. Patient will contact me via [employer] for further dosing instructions.  7. F/u with referral to immunology, but overall lab work is normal and IgG is mildly low.  8. F/u in 2-3mos for compliance check/symptom check, sooner if needed.    Please note that this dictation was created using voice recognition software. I have made every reasonable attempt to correct obvious errors, but it is possible there are errors of grammar and possibly content that I did not discover before finalizing the note.

## 2021-01-20 RX ORDER — ONDANSETRON 4 MG/1
4 TABLET, FILM COATED ORAL EVERY 4 HOURS PRN
Qty: 20 TAB | Refills: 0 | Status: SHIPPED | OUTPATIENT
Start: 2021-01-20

## 2021-01-25 ENCOUNTER — TELEPHONE (OUTPATIENT)
Dept: PHYSICAL THERAPY | Facility: REHABILITATION | Age: 41
End: 2021-01-25

## 2021-01-25 NOTE — OP THERAPY DISCHARGE SUMMARY
Outpatient Physical Therapy  DISCHARGE SUMMARY NOTE      [facility] [address]  [address]  [place] [address]  Phone:  [phone]  Fax:  [phone]    Date of Visit: 01/25/2021    Patient: Bree Gage  YOB: 1980  MRN: [id number]     Referring Provider: No referring provider defined for this encounter.   Referring Diagnosis No admission diagnoses are documented for this encounter.       Your patient is being discharged from Physical Therapy with the following comments:   · Other    Recommendations:  D/C from PT. There has been a lapse in time greater than prescription coverage allows.    Ayala Astorga, PT, DPT    Date: 1/25/2021

## 2021-01-28 ENCOUNTER — HOME STUDY (OUTPATIENT)
Dept: SLEEP MEDICINE | Facility: MEDICAL CENTER | Age: 41
End: 2021-01-28
Attending: NURSE PRACTITIONER
Payer: COMMERCIAL

## 2021-01-28 DIAGNOSIS — G47.33 OSA ON CPAP: ICD-10-CM

## 2021-01-28 DIAGNOSIS — R40.0 DAYTIME SOMNOLENCE: ICD-10-CM

## 2021-02-01 ENCOUNTER — NON-PROVIDER VISIT (OUTPATIENT)
Dept: NEUROLOGY | Facility: MEDICAL CENTER | Age: 41
End: 2021-02-01
Attending: PSYCHIATRY & NEUROLOGY
Payer: COMMERCIAL

## 2021-02-01 DIAGNOSIS — G43.119 BASILAR ARTERY MIGRAINE, INTRACTABLE: ICD-10-CM

## 2021-02-01 PROCEDURE — 94762 N-INVAS EAR/PLS OXIMTRY CONT: CPT | Performed by: FAMILY MEDICINE

## 2021-02-01 PROCEDURE — 700111 HCHG RX REV CODE 636 W/ 250 OVERRIDE (IP): Performed by: PSYCHIATRY & NEUROLOGY

## 2021-02-01 PROCEDURE — 96372 THER/PROPH/DIAG INJ SC/IM: CPT | Performed by: PSYCHIATRY & NEUROLOGY

## 2021-02-01 RX ORDER — KETOROLAC TROMETHAMINE 30 MG/ML
60 INJECTION, SOLUTION INTRAMUSCULAR; INTRAVENOUS ONCE
Status: COMPLETED | OUTPATIENT
Start: 2021-02-01 | End: 2021-02-01

## 2021-02-01 RX ADMIN — KETOROLAC TROMETHAMINE 60 MG: 60 INJECTION, SOLUTION INTRAMUSCULAR at 15:40

## 2021-02-01 NOTE — PROCEDURES
Over Night Pulse Oximetry     Indication:To assess the efficacy of the current pressure .       Impression:   The study was done on CPAP 5-12  cm. The total analyzed time was 7 hrs 46 min. O2 Sat. mira was 80% and mean O2 sat was 89 % and baseline O2 at 92%. O2 sat was below 88% for 9 min of the flow evaluation time. Oxygen Desaturation (>=3%) Index was elevated at 1.9/hr.      Recommendation:  The O2 mira seems like an artifact. Continue CPAP at the current pressure . Clinical correlation recommended.

## 2021-02-02 ENCOUNTER — PATIENT MESSAGE (OUTPATIENT)
Dept: MEDICAL GROUP | Facility: MEDICAL CENTER | Age: 41
End: 2021-02-02

## 2021-02-03 ENCOUNTER — PATIENT MESSAGE (OUTPATIENT)
Dept: MEDICAL GROUP | Facility: MEDICAL CENTER | Age: 41
End: 2021-02-03

## 2021-02-03 NOTE — TELEPHONE ENCOUNTER
From: Bree Gage  To: XUAN Ko  Sent: 2/2/2021 11:16 AM PST  Subject: Non-Urgent Medical Question    Good Morning, the Severe neck pain is causing me to vomit and is just getting worse. I will be leaving work early to ice, relax and heat. I have an appointment with you next week to discuss. I have been steadily taking tylenol and mobic and methocarbamol (when I am at home). I fear a migraine and it seems to be so deep that its causing me to feel like I'm choking. I did put in a request if there is openings for a virtual sometime this week.      ----- Message -----   From:XUAN Ko   Sent:1/20/2021 6:52 AM PST   To:Bree Gage   Subject:RE: Non-Urgent Medical Question    Nate Giron,  Yes, I can send in tablet zofran. Remember it can be constipating if you are using it regularly.  The hand pain could be a nerve issue stemming from the neck. You would probably need a nerve conduction test which can be done either through pain management (same as physiatry) or orthopedics. We should see you in the office to evaluate this before referring.  Glad you will be seeing allergy soon.  Happy new year,  Gillian CHEEMA      ----- Message -----   From:Bree Gage   Sent:1/19/2021 1:35 PM PST   To:XUAN oK   Subject:Non-Urgent Medical Question    Good Afternoon :) My IUD has been great so far! I wanted to check in.   1. I stopped my Reglan after many years of using it due to reading up on some long term Side Effects. My nausea is quite curbed but I'd like to ask if I can replace it with the Tablet form of Zofran please. I am allergic (or at least sensitive to the Disintegrating)  2. My hand resolved after weeks of strange pain but now I have severe neck pain. I used to see Pain Management and Physiatry but unsure which is the best route.  3. Seeing Allergy/Immunotherapy at the end of the month to follow up on those funky labs.    Thank you for everything and I  hope the New Year is treating you well.

## 2021-02-04 NOTE — TELEPHONE ENCOUNTER
From: Bree Gage  To: XUAN Ko  Sent: 2/3/2021 11:36 AM PST  Subject: Non-Urgent Medical Question    Not at all, I stayed home today. I am heating & resting.       ----- Message -----   From:XUAN Ko   Sent:2/3/2021 9:01 AM PST   To:Bree Gage   Subject:RE: Non-Urgent Medical Question    Hi Bree. Are you feeling any better today? I do not have any openings this week, I am going to be out of the office for some educational training.      ----- Message -----   From:Bree Gage   Sent:2/2/2021 11:16 AM PST   To:XUAN Ko   Subject:Non-Urgent Medical Question    Good Morning, the Severe neck pain is causing me to vomit and is just getting worse. I will be leaving work early to ice, relax and heat. I have an appointment with you next week to discuss. I have been steadily taking tylenol and mobic and methocarbamol (when I am at home). I fear a migraine and it seems to be so deep that its causing me to feel like I'm choking. I did put in a request if there is openings for a virtual sometime this week.      ----- Message -----   From:KARLI KoRSENIA   Sent:1/20/2021 6:52 AM PST   To:Bree Gage   Subject:RE: Non-Urgent Medical Question    Nate Giron,  Yes, I can send in tablet zofran. Remember it can be constipating if you are using it regularly.  The hand pain could be a nerve issue stemming from the neck. You would probably need a nerve conduction test which can be done either through pain management (same as physiatry) or orthopedics. We should see you in the office to evaluate this before referring.  Glad you will be seeing allergy soon.  Happy new year,  Gillian CHEEMA      ----- Message -----   From:Bree Gage   Sent:1/19/2021 1:35 PM PST   To:XUAN Ko   Subject:Non-Urgent Medical Question    Good Afternoon :) My IUD has been great so far! I wanted to check in.   1. I stopped my Reglan after many years  of using it due to reading up on some long term Side Effects. My nausea is quite curbed but I'd like to ask if I can replace it with the Tablet form of Zofran please. I am allergic (or at least sensitive to the Disintegrating)  2. My hand resolved after weeks of strange pain but now I have severe neck pain. I used to see Pain Management and Physiatry but unsure which is the best route.  3. Seeing Allergy/Immunotherapy at the end of the month to follow up on those funky labs.    Thank you for everything and I hope the New Year is treating you well.

## 2021-02-05 ENCOUNTER — TELEPHONE (OUTPATIENT)
Dept: SLEEP MEDICINE | Facility: MEDICAL CENTER | Age: 41
End: 2021-02-05

## 2021-02-05 NOTE — TELEPHONE ENCOUNTER
Called pt regarding the result of the OPO she completed on 1/28/2021. Per     Recommendation:The O2 mira seems like an artifact. Continue CPAP at the current pressure . Clinical correlation recommended.     Also per Germán Good maintenance of oxygen levels. Continue current settings.    Called the pt to relay message and OPO is scan into media.

## 2021-02-09 ENCOUNTER — HOSPITAL ENCOUNTER (OUTPATIENT)
Dept: LAB | Facility: MEDICAL CENTER | Age: 41
End: 2021-02-09
Attending: ALLERGY & IMMUNOLOGY
Payer: COMMERCIAL

## 2021-02-09 LAB
C3 SERPL-MCNC: 99.4 MG/DL (ref 87–200)
C4 SERPL-MCNC: 7.7 MG/DL (ref 19–52)

## 2021-02-09 PROCEDURE — 36415 COLL VENOUS BLD VENIPUNCTURE: CPT

## 2021-02-09 PROCEDURE — 82785 ASSAY OF IGE: CPT

## 2021-02-09 PROCEDURE — 86160 COMPLEMENT ANTIGEN: CPT | Mod: 91

## 2021-02-09 PROCEDURE — 82784 ASSAY IGA/IGD/IGG/IGM EACH: CPT

## 2021-02-09 PROCEDURE — 86317 IMMUNOASSAY INFECTIOUS AGENT: CPT | Mod: 91

## 2021-02-11 LAB
IGA SERPL-MCNC: 365 MG/DL (ref 68–408)
IGE SERPL-ACNC: 7 KU/L
IGG SERPL-MCNC: 738 MG/DL (ref 768–1632)
IGM SERPL-MCNC: 64 MG/DL (ref 35–263)

## 2021-02-12 ENCOUNTER — TELEMEDICINE (OUTPATIENT)
Dept: MEDICAL GROUP | Facility: MEDICAL CENTER | Age: 41
End: 2021-02-12
Payer: COMMERCIAL

## 2021-02-12 ENCOUNTER — PATIENT MESSAGE (OUTPATIENT)
Dept: SLEEP MEDICINE | Facility: MEDICAL CENTER | Age: 41
End: 2021-02-12

## 2021-02-12 VITALS — BODY MASS INDEX: 30.96 KG/M2 | WEIGHT: 164 LBS | HEIGHT: 61 IN

## 2021-02-12 DIAGNOSIS — M54.2 NECK PAIN ON RIGHT SIDE: ICD-10-CM

## 2021-02-12 LAB
C DIPHTHERIAE IGG SER-ACNC: 0.2 IU/ML
C TETANI TOXOID IGG SERPL IA-ACNC: 4.7 IU/ML
S PN DA SERO 19F IGG SER-MCNC: 0.64 UG/ML
S PNEUM DA 1 IGG SER-MCNC: 7.42 UG/ML
S PNEUM DA 12F IGG SER-MCNC: 0.16 UG/ML
S PNEUM DA 14 IGG SER-MCNC: 6.22 UG/ML
S PNEUM DA 18C IGG SER-MCNC: >56.91 UG/ML
S PNEUM DA 23F IGG SER-MCNC: 1.19 UG/ML
S PNEUM DA 3 IGG SER-MCNC: 14.17 UG/ML
S PNEUM DA 4 IGG SER-MCNC: 0.29 UG/ML
S PNEUM DA 5 IGG SER-MCNC: 2.29 UG/ML
S PNEUM DA 6B IGG SER-MCNC: 0.72 UG/ML
S PNEUM DA 7F IGG SER-MCNC: 2.93 UG/ML
S PNEUM DA 8 IGG SER-MCNC: 1.31 UG/ML
S PNEUM DA 9N IGG SER-MCNC: 0.2 UG/ML
S PNEUM DA 9V IGG SER-MCNC: 0.38 UG/ML
S PNEUM SEROTYPE IGG SER-IMP: NORMAL

## 2021-02-12 PROCEDURE — 99213 OFFICE O/P EST LOW 20 MIN: CPT | Mod: 95,CR | Performed by: NURSE PRACTITIONER

## 2021-02-12 RX ORDER — METHOCARBAMOL 500 MG/1
500 TABLET, FILM COATED ORAL 3 TIMES DAILY PRN
COMMUNITY
Start: 2021-01-07

## 2021-02-12 ASSESSMENT — FIBROSIS 4 INDEX: FIB4 SCORE: 0.94

## 2021-02-12 NOTE — TELEPHONE ENCOUNTER
From: Bree Gage  To: Nurse Practicioner Garima Berry  Sent: 2/12/2021 6:16 AM PST  Subject: Prescription Question    Re: Mirapex  I believe I have had a reaction unfortunately. For the last 3 weeks I have not been sleeping, my CPAP comes off every night and my muscular pain has intensified to the point that I have had to call in sick. I do have an appointment with my Primary but I think I need to stop it.

## 2021-02-13 NOTE — PROGRESS NOTES
"Telemedicine: Established Patient   This evaluation was conducted via Zoom using secure and encrypted videoconferencing technology. The patient was in a private location in the state of [place].    The patient's identity was confirmed and verbal consent was obtained for this virtual visit.    Subjective:   CC:   Chief Complaint   Patient presents with   • Neck Pain     [id number]        Bree Gage is a 40 y.o. female presenting for evaluation and management of:    Neck pain on right side  New problem with insidious onset about 3-4 weeks ago  Work up with R neck painful and stiff. Has progressively worsened in the last few weeks and contributing to increase in migraines and missed work  Pain and muscular tenderness starts in the R upper neck extending down through the trapezius and wrapping to the anterior shoulder. Tender to touch, tight, feels \"bruised\". Worse with movement, turning head to the R, or with palpation.  She has been taking meloxicam daily, methocarbamol up to TID, applying diclofenac and lidocaine cream, using heating pad or ice and attempting gentle stretching without any relief.  No change in ROM in the neck or shoulder. No pain radiating into the R arm. No numbness, tingling, weakness in the arm.  She had a similar issue a few years ago which eventually resolved with physical therapy.  No prior imaging        ROS      Allergies   Allergen Reactions   • Ciprofloxacin    • Compazine Unspecified     agitation   • Diclofenac      Upset     • Epinephrine      palpitations   • Latex        Current medicines (including changes today)  Current Outpatient Medications   Medication Sig Dispense Refill   • methocarbamol (ROBAXIN) 500 MG Tab Take 500 mg by mouth 3 times a day as needed.     • ondansetron (ZOFRAN) 4 MG Tab tablet Take 1 Tab by mouth every four hours as needed for Nausea/Vomiting. 20 Tab 0   • modafinil (PROVIGIL) 200 MG Tab Take 1 Tab by mouth every day for 60 days. 30 Tab 1   • modafinil " (PROVIGIL) 100 MG Tab May take as needed at 1pm if ongoing hypersomnolence 30 Tab 1   • pramipexole (MIRAPEX) 0.125 MG Tab Take 1 Tab by mouth at bedtime as needed for up to 60 days. (Patient not taking: Reported on 2/12/2021) 30 Tab 1   • predniSONE (DELTASONE) 10 MG Tab 6 TAB DAILY FOR 2 DAYS, THEN REDUCE BY 1 TAB EVERY OTHER DAY UNTIL GONE 45 Tab 0   • benzonatate (TESSALON) 100 MG Cap TAKE 1 CAPSULE BY MOUTH THREE TIMES A DAY AS NEEDED FOR COUGH 60 Cap 0   • meloxicam (MOBIC) 15 MG tablet TAKE 1 TAB BY MOUTH DAILY FOR 2 WEEKS,THEN 1 TAB DAILY AS NEEDED     • albuterol 108 (90 Base) MCG/ACT Aero Soln inhalation aerosol Inhale 2 Puffs by mouth every four hours as needed for Shortness of Breath. 1 Each 5   • venlafaxine XR (EFFEXOR XR) 75 MG CAPSULE SR 24 HR Take 1 Cap by mouth every day. 90 Cap 3   • aripiprazole (ABILIFY) 5 MG tablet Take 0.5 Tabs by mouth every day. 45 Tab 2   • omeprazole (PRILOSEC) 20 MG delayed-release capsule Take 20 mg by mouth every day.     • Diclofenac Sodium 1 % Gel Apply 2-4 g to skin as directed 4 times a day as needed. 1 Tube 2   • Lactobacillus Acidophilus Powder Take  by mouth.     • Cetirizine HCl 10 MG Cap Take 10 mg by mouth.     • azelastine (ASTELIN) 137 MCG/SPRAY nasal spray      • acetaminophen (TYLENOL) 500 MG TABS Take 500-1,000 mg by mouth every 6 hours as needed. Up to 8 a day       No current facility-administered medications for this visit.       Patient Active Problem List    Diagnosis Date Noted   • Neck pain on right side 02/12/2021   • Daytime somnolence 01/19/2021   • ROME on CPAP 10/23/2020   • Low vitamin D level 10/23/2020   • Obesity (BMI 30-39.9) 08/30/2019   • History of fracture of right ankle 06/13/2019   • Numbness of right hand 04/17/2019   • Elevated glucose 06/22/2018   • Obesity, Class I, BMI 30.0-34.9 (see actual BMI) 05/23/2018   • Moderate episode of recurrent major depressive disorder (HCC) 05/23/2018   • Asthma in adult without complication  05/23/2018   • Basilar artery migraine, intractable 10/11/2017   • Chronic migraine 10/11/2017   • EEG abnormal 10/11/2017   • Hyperprolactinemia (HCC) 02/13/2013   • Fatigue 09/03/2009   • Preventative health care 09/03/2009   • IBS (irritable bowel syndrome)    • History of alcohol abuse    • History of methamphetamine abuse (HCC)    • History of hypothyroidism    • Hemorrhoids    • Rectal fissure    • Varicose veins    • Iron deficiency anemia    • Hyperlipidemia    • Low back pain    • EBV positive mononucleosis syndrome        Family History   Problem Relation Age of Onset   • Hypertension Mother    • Hyperlipidemia Mother    • Alcohol/Drug Mother    • Psychiatric Illness Mother    • Hypertension Father    • Hyperlipidemia Father    • Alcohol/Drug Father    • Psychiatric Illness Father    • Cancer Maternal Grandfather    • Heart Disease Maternal Grandfather    • Heart Disease Paternal Grandfather    • Alcohol/Drug Paternal Grandfather        She  has a past medical history of Alcohol addiction ([facility]), Allergy, Angina (last 2 years), Apnea, sleep, Arthritis (current), Back pain, Bronchitis, Chickenpox, Constipation, Daytime sleepiness, Depression, Dizziness, Drug addiction ([facility]), EBV positive mononucleosis syndrome, Eye pain, Fatigue, Fever, Fracture of finger, Frequent headaches, Gasping for breath, GERD (gastroesophageal reflux disease), Hemorrhoids, Hyperlipidemia, Hypothyroidism, IBS (irritable bowel syndrome), Indigestion, Influenza, Iron deficiency anemia, LBP (low back pain), Morning headache, Painful joint, Rectal fissure, Snoring, Sore muscles, Varicose veins, and Wears glasses. She also has no past medical history of Arrhythmia, Cancer ([facility]), CATARACT, Congestive heart failure ([facility]), COPD, Diabetes, Dialysis, Glaucoma, Heart murmur, Heart valve disease, Hypertension, Jaundice, Myocardial infarct (HCC), Other specified symptom associated with female genital organs, Pacemaker, Personal history of venous  "thrombosis and embolism, Pneumonia, Renal disorder, Rheumatic fever, Seizure (HCC), Stroke (HCC), Unspecified hemorrhagic conditions, or Unspecified urinary incontinence.  She  has a past surgical history that includes tracheostomy (10/29/2009) and gastrostomy laparoscopic (10/29/2009).       Objective:   Ht 1.549 m (5' 1\")   Wt 74.4 kg (164 lb)   BMI 30.99 kg/m²     Physical Exam  Physical Exam:  Constitutional: Alert, no distress, well-groomed.  Skin: No rashes in visible areas.  Eye: Round. Conjunctiva clear, lids normal. No icterus.   ENMT: Lips pink without lesions, good dentition, moist mucous membranes. Phonation normal.  Neck: No masses, no thyromegaly. Moves freely without pain.  CV: Pulse as reported by patient  Respiratory: Unlabored respiratory effort, no cough or audible wheeze  Psych: Alert and oriented x3, normal affect and mood.   MS: pt indicates pain through the R neck extending around to the R anterior shoulder with muscular tenderness. Demonstrates full ROM in neck and shoulder  Assessment and Plan:   The following treatment plan was discussed:     1. Neck pain on right side  - DX-CERVICAL SPINE-2 OR 3 VIEWS; Future  - REFERRAL TO PHYSICAL THERAPY  Appears to be muscular in nature with tenderness and possible spasm through the R trapezius. Not improving with NSAID and muscle relaxant as well as topical analgesics. We will evaluate xray and refer for PT. May benefit from manual therapy. Discussed referral to physiatry for possible trigger point injections, will wait and see how PT goes first.  May continue current medications as prescribed.      Follow-up: pending PT eval         "

## 2021-02-13 NOTE — ASSESSMENT & PLAN NOTE
"New problem with insidious onset about 3-4 weeks ago  Work up with R neck painful and stiff. Has progressively worsened in the last few weeks and contributing to increase in migraines and missed work  Pain and muscular tenderness starts in the R upper neck extending down through the trapezius and wrapping to the anterior shoulder. Tender to touch, tight, feels \"bruised\". Worse with movement, turning head to the R, or with palpation.  She has been taking meloxicam daily, methocarbamol up to TID, applying diclofenac and lidocaine cream, using heating pad or ice and attempting gentle stretching without any relief.  No change in ROM in the neck or shoulder. No pain radiating into the R arm. No numbness, tingling, weakness in the arm.  She had a similar issue a few years ago which eventually resolved with physical therapy.  No prior imaging  "

## 2021-02-16 ENCOUNTER — HOSPITAL ENCOUNTER (OUTPATIENT)
Dept: RADIOLOGY | Facility: MEDICAL CENTER | Age: 41
End: 2021-02-16
Attending: NURSE PRACTITIONER
Payer: COMMERCIAL

## 2021-02-16 ENCOUNTER — PATIENT MESSAGE (OUTPATIENT)
Dept: MEDICAL GROUP | Facility: MEDICAL CENTER | Age: 41
End: 2021-02-16

## 2021-02-16 DIAGNOSIS — M54.2 NECK PAIN ON RIGHT SIDE: ICD-10-CM

## 2021-02-16 PROCEDURE — 72040 X-RAY EXAM NECK SPINE 2-3 VW: CPT

## 2021-02-16 RX ORDER — DICLOFENAC EPOLAMINE 0.01 G/1
1 SYSTEM TOPICAL 2 TIMES DAILY
Qty: 60 PATCH | Refills: 0 | Status: SHIPPED | OUTPATIENT
Start: 2021-02-16 | End: 2021-03-04

## 2021-02-16 RX ORDER — PRAMIPEXOLE DIHYDROCHLORIDE 0.12 MG/1
0.12 TABLET ORAL NIGHTLY PRN
Qty: 30 TABLET | Refills: 1 | Status: SHIPPED | OUTPATIENT
Start: 2021-02-16 | End: 2021-03-04

## 2021-02-16 NOTE — TELEPHONE ENCOUNTER
Have we ever prescribed this med? Yes.  If yes, what date? 01/19/2021    Last OV: 01/19/2021 with Garima CHEEMA    Next OV: 03/25/21 with Garima CHEEMA    DX:     Medications:   Requested Prescriptions     Pending Prescriptions Disp Refills   • pramipexole (MIRAPEX) 0.125 MG Tab [Pharmacy Med Name: PRAMIPEXOLE 0.125 MG TABLET]  1     Sig: TAKE 1 TAB BY MOUTH AT BEDTIME AS NEEDED FOR UP TO 60 DAYS.

## 2021-02-18 NOTE — TELEPHONE ENCOUNTER
From: Bree Gage  To: Nurse Practicioner Jacque Song  Sent: 2/16/2021 3:32 PM PST  Subject: Prescription Question    My mother suffers from spinal stenosis, DDD/DJD and OA. She has had 5 Cervial & Lumbar surgeries due to the DDD and a right hip replacement. My father in fact has had 3 spinal surgeries as well from DDD.  What can I do to prevent this? I do plan on seeing the PT with manual therapy for sure as I believe that could be very beneficial.  I have seen pain management in the past ([facility]) and have considered Chiropractic therapy. Acupuncture has been attempted for the migraines multiple times with no luck. I have been experimenting with CBD but it makes me so wright tired.      ----- Message -----   From:Nurse Practicioner Jacque Song   Sent:2/16/2021 2:52 PM PST   To:Bree Gage   Subject:RE: Prescription Question    Sure, I can send that in for you. I have no idea what cost will be so you may want a check with [facility].      ----- Message -----   From:Bree Gage   Sent:2/16/2021 9:54 AM PST   To:Nurse Practicioner Jacque Song   Subject:Prescription Question    May I try the Diclofenac Patches? I've used up the Topical cream and Feel a patch might last longer. I will be getting my xray today as the weekend was filled with ice packs and a heating pad and epsom bath soaks.

## 2021-03-01 ENCOUNTER — PATIENT MESSAGE (OUTPATIENT)
Dept: MEDICAL GROUP | Facility: MEDICAL CENTER | Age: 41
End: 2021-03-01

## 2021-03-04 ENCOUNTER — OFFICE VISIT (OUTPATIENT)
Dept: URGENT CARE | Facility: CLINIC | Age: 41
End: 2021-03-04
Payer: COMMERCIAL

## 2021-03-04 ENCOUNTER — HOSPITAL ENCOUNTER (OUTPATIENT)
Facility: MEDICAL CENTER | Age: 41
End: 2021-03-04
Attending: FAMILY MEDICINE
Payer: COMMERCIAL

## 2021-03-04 ENCOUNTER — PATIENT MESSAGE (OUTPATIENT)
Dept: MEDICAL GROUP | Facility: MEDICAL CENTER | Age: 41
End: 2021-03-04

## 2021-03-04 VITALS
SYSTOLIC BLOOD PRESSURE: 114 MMHG | BODY MASS INDEX: 31.34 KG/M2 | DIASTOLIC BLOOD PRESSURE: 78 MMHG | HEART RATE: 94 BPM | TEMPERATURE: 97 F | OXYGEN SATURATION: 97 % | WEIGHT: 166 LBS | HEIGHT: 61 IN | RESPIRATION RATE: 16 BRPM

## 2021-03-04 DIAGNOSIS — J02.9 SORE THROAT: ICD-10-CM

## 2021-03-04 LAB
COVID ORDER STATUS COVID19: NORMAL
INT CON NEG: NORMAL
INT CON POS: NORMAL
S PYO AG THROAT QL: NORMAL
SARS-COV-2 RNA RESP QL NAA+PROBE: NOTDETECTED
SPECIMEN SOURCE: NORMAL

## 2021-03-04 PROCEDURE — U0005 INFEC AGEN DETEC AMPLI PROBE: HCPCS

## 2021-03-04 PROCEDURE — 99213 OFFICE O/P EST LOW 20 MIN: CPT | Performed by: FAMILY MEDICINE

## 2021-03-04 PROCEDURE — U0003 INFECTIOUS AGENT DETECTION BY NUCLEIC ACID (DNA OR RNA); SEVERE ACUTE RESPIRATORY SYNDROME CORONAVIRUS 2 (SARS-COV-2) (CORONAVIRUS DISEASE [COVID-19]), AMPLIFIED PROBE TECHNIQUE, MAKING USE OF HIGH THROUGHPUT TECHNOLOGIES AS DESCRIBED BY CMS-2020-01-R: HCPCS

## 2021-03-04 PROCEDURE — 87880 STREP A ASSAY W/OPTIC: CPT | Performed by: FAMILY MEDICINE

## 2021-03-04 RX ORDER — AMOXICILLIN 500 MG/1
CAPSULE ORAL
COMMUNITY
Start: 2021-02-26 | End: 2021-04-01

## 2021-03-04 RX ORDER — IBUPROFEN 800 MG/1
TABLET ORAL
COMMUNITY
Start: 2021-02-26 | End: 2021-04-22

## 2021-03-04 ASSESSMENT — FIBROSIS 4 INDEX: FIB4 SCORE: 0.94

## 2021-03-05 NOTE — PROGRESS NOTES
Chief Complaint   Patient presents with   • Pharyngitis     x 2 days, sore throat, neck pain, fever and headaches.  Recent tooth extraction, not sure if is related.          Subjective:     CC:  presents with Pharyngitis            Pharyngitis   This is a new problem. The current episode started in the past 2 days. The problem has been unchanged.  She is 5 d s/p rt lower molar extraction and is currently on augmentin for dental infection.    There has been subj fever. The pain is mild. Associated symptoms includes: nasal congestion. Pertinent negatives include no abdominal pain,  diarrhea, headaches, shortness of breath or vomiting. no exposure to strep or mono.   has tried acetaminophen for the symptoms. The treatment provided mild relief.     Social History     Tobacco Use   • Smoking status: Former Smoker     Packs/day: 0.50     Years: 19.00     Pack years: 9.50     Types: Cigarettes     Start date: 1998     Quit date: 8/15/2015     Years since quittin.5   • Smokeless tobacco: Never Used   • Tobacco comment: Quit 2015   Substance Use Topics   • Alcohol use: Yes     Alcohol/week: 0.0 oz     Comment: one or two a week   • Drug use: Not Currently     Types: Methamphetamines     Comment: clean since   [facility] ,  [facility]       Past Medical History:   Diagnosis Date   • Alcohol addiction (HCC)    • Allergy    • Angina last 2 years    pt attributes to asthma   • Apnea, sleep    • Arthritis current    osteoarthritis   • Back pain    • Bronchitis     4-5x/year   • Chickenpox    • Constipation    • Daytime sleepiness    • Depression    • Dizziness    • Drug addiction (HCC)    • EBV positive mononucleosis syndrome    • Eye pain    • Fatigue    • Fever    • Fracture of finger     rt index x 3   • Frequent headaches    • Gasping for breath    • GERD (gastroesophageal reflux disease)    • Hemorrhoids    • Hyperlipidemia    • Hypothyroidism    • IBS (irritable bowel syndrome)    • Indigestion    •  "Influenza    • Iron deficiency anemia    • LBP (low back pain)     si joint epidural 2008   • Morning headache    • Painful joint    • Rectal fissure    • Snoring    • Sore muscles    • Varicose veins    • Wears glasses        Review of Systems   Constitutional: Positive for malaise/fatigue, subj fever  HENT: Positive for sore throat   Respiratory: Negative for cough, sputum production and shortness of breath.    Cardiovascular: Negative for chest pain.   Gastrointestinal: Negative for nausea, vomiting, abdominal pain and diarrhea.   Genitourinary: Negative.    Neurological: Negative for dizziness and headaches.   All other systems reviewed and are negative.         Objective:   /78 (BP Location: Left arm, Patient Position: Sitting, BP Cuff Size: Large adult)   Pulse 94   Temp 36.1 °C (97 °F) (Temporal)   Resp 16   Ht 1.549 m (5' 1\")   Wt 75.3 kg (166 lb)   SpO2 97%         Physical Exam   Constitutional:   oriented to person, place, and time.  appears well-developed and well-nourished. No distress.   HENT:   Head: Normocephalic and atraumatic.   Right Ear: External ear normal.   Left Ear: External ear normal.   Nose: Mucosal edema present. Right sinus exhibits no maxillary sinus tenderness and no frontal sinus tenderness. Left sinus exhibits no maxillary sinus tenderness and no frontal sinus tenderness.   Mouth/Throat: no posterior oropharyngeal exudate.   There is posterior oropharyngeal erythema present. No posterior oropharyngeal edema.   Tonsils 1+ bilaterally     Eyes: Conjunctivae and EOM are normal. Pupils are equal, round, and reactive to light. Right eye exhibits no discharge. Left eye exhibits no discharge. No scleral icterus.   Neck: Normal range of motion. Neck supple. No JVD present. No tracheal deviation present. No thyromegaly present.   Cardiovascular: Normal rate, regular rhythm, normal heart sounds and intact distal pulses.  Exam reveals no friction rub.    No murmur " heard.  Pulmonary/Chest: Effort normal and breath sounds normal. No respiratory distress.   no wheezes.   no rales.    Musculoskeletal:  exhibits no edema.   Lymphadenopathy:    no cervical LAD  Neurological:   alert and oriented to person, place, and time.   Skin: Skin is warm and dry. No erythema.   Psychiatric:   normal mood and affect.   Nursing note and vitals reviewed.             Assessment/Plan:     1. Sore throat  Likely viral    rapid strep negative.   COVID screen sent  Home isolation for now  Will call with results.     Follow up in one week if no improvement, sooner if symptoms worsen.      rx motrin 800mg tid prn

## 2021-03-08 ENCOUNTER — PATIENT MESSAGE (OUTPATIENT)
Dept: MEDICAL GROUP | Facility: MEDICAL CENTER | Age: 41
End: 2021-03-08

## 2021-03-08 DIAGNOSIS — F33.1 MODERATE EPISODE OF RECURRENT MAJOR DEPRESSIVE DISORDER (HCC): ICD-10-CM

## 2021-03-08 RX ORDER — ARIPIPRAZOLE 5 MG/1
5 TABLET ORAL DAILY
Qty: 90 TABLET | Refills: 1 | Status: SHIPPED | OUTPATIENT
Start: 2021-03-08 | End: 2021-03-24

## 2021-03-09 ENCOUNTER — PATIENT MESSAGE (OUTPATIENT)
Dept: MEDICAL GROUP | Facility: MEDICAL CENTER | Age: 41
End: 2021-03-09

## 2021-03-09 NOTE — TELEPHONE ENCOUNTER
"From: Bree Gage  To: Nurse Practicioner Jacque Song  Sent: 3/9/2021 9:44 AM PST  Subject: Non-Urgent Medical Question    She stated \"my Primary should be taking care of this.\" - in regards to the Thyroid labs and was more interested in skin testing me than immunology.   I was a very strange interaction as I have seen her for over 8 years - I used to receive immunotherapy with no change in migraine symptoms. She was more interested in My food allergies and I left a bit angry because she seemed so unwilling. I had met Dr Mixon who was in her office before as a student, but he is now practicing in an unknown location.      ----- Message -----   From:Nurse Practicioner Jacque Song   Sent:3/9/2021 8:06 AM PST   To:Bree Gage   Subject:RE: Non-Urgent Medical Question    Your value for the diphtheria was 0.2. It states that values of 0.1 or greater are considered protective.  I am sorry I forgot to address your question about immunology. I really do not know any other it immunologists here in town. What was the reason that you are not wanting to return to their practice?      ----- Message -----   From:Bree Gage   Sent:3/9/2021 8:01 AM PST   To:Nurse Practicioner Jacque Song   Subject:Non-Urgent Medical Question    Thank you very much.  Since I will not be returning to Dr Reyes can you please tell me if I am indeed immune to Diptheria? I cannot interpret the result, they were drawn about 3 weeks ago. PT is scheduled for next week (yay) and I will keep you updated.    Happy Tuesday.      ----- Message -----   From:Nurse Practicioner Jacque Song   Sent:3/9/2021 6:48 AM PST   To:Bree Gage   Subject:RE: Non-Urgent Medical Question    Nate Giron,  Paperwork was completed and sent in for you yesterday. There is a copy at the  for you to .  Gillian CHEEMA      ----- Message -----   From:Bree Gage   Sent:3/8/2021 9:34 AM PST   To:Nurse Ronda Santillan" SHELLI Song   Subject:Non-Urgent Medical Question    Thank you for that. I really appreciate it!    Attaching another.      ----- Message -----   From:Nurse Practicioner Jacque Song   Sent:3/8/2021 9:30 AM PST   To:Bree Gage   Subject:RE: Non-Urgent Medical Question    Hi Bree,  Going up on the [facility] is fine.  The forms that you sent to have 4 days per episode listed and I am not able to edit this. Can you send a copy that has not been filled out?  Gillian CHEEMA      ----- Message -----   From:Bree Gage   Sent:3/8/2021 8:24 AM PST   To:Nurse Practicioner Jacque Song   Subject:Non-Urgent Medical Question    Yes, it is the same. I am attaching. The only thing they did ask was to change page 3; #7 to a duration of 5 days per episode if possible. Ended up in  Thursday with a migraine, fever and sore throat along with the severe pain from the tooth extraction. Covid neg. He thinks its yet another virus. Are there other immunology specialists in [place]? Dr Mixon is leaving and I prefer not to see Dr Reyes again, I am getting sick too often and its wearing me down. Especially with work, I cannot keep getting sick. I am back at work today but will be written up for excessive absences aside from my FMLA. I believe my depression gets worse during these times and I've been snappy, garrett, mean. I'd Like to trial up my aripiprazole to a full tab HS      ----- Message -----   From:Nurse Practicioner Jacque Song   Sent:3/4/2021 2:44 PM PST   To:Bree Gage   Subject:RE: Non-Urgent Medical Question    Is is just for renewal of what we did last year?      ----- Message -----   From:Bree Gage   Sent:3/4/2021 7:46 AM PST   To:Nurse Practicioner Jacque Song   Subject:Non-Urgent Medical Question    Good morning. I had to call out today and I have an urgent care appointment as I have a super sore throat I've had a mild fever for a week and there's a possibility that the extraction has  caused infection & I'm downright miserable with a migraine & nausea. I have an appointment with you today to fill out my [place] paperwork. Can these be uploaded via [employer] to you?

## 2021-03-09 NOTE — TELEPHONE ENCOUNTER
From: Bree Gage  To: Nurse Practicioner Jacque Song  Sent: 3/9/2021 8:01 AM PST  Subject: Non-Urgent Medical Question    Thank you very much.  Since I will not be returning to Dr Reyes can you please tell me if I am indeed immune to Diptheria? I cannot interpret the result, they were drawn about 3 weeks ago. PT is scheduled for next week (yay) and I will keep you updated.    Happy Tuesday.      ----- Message -----   From:Nurse Practicioner Jacque Song   Sent:3/9/2021 6:48 AM PST   To:Bree Gage   Subject:RE: Non-Urgent Medical Question    Hi Bree,  Paperwork was completed and sent in for you yesterday. There is a copy at the  for you to .  Gillian CHEEMA      ----- Message -----   From:Bree Gage   Sent:3/8/2021 9:34 AM PST   To:Nurse Practicioner Jacque Song   Subject:Non-Urgent Medical Question    Thank you for that. I really appreciate it!    Attaching another.      ----- Message -----   From:Nurse Practicioner Jacque Song   Sent:3/8/2021 9:30 AM PST   To:Bree Gage   Subject:RE: Non-Urgent Medical Question    Hi Bree,  Going up on the [facility] is fine.  The forms that you sent to have 4 days per episode listed and I am not able to edit this. Can you send a copy that has not been filled out?  Gillian CHEEMA      ----- Message -----   From:Bree Gage   Sent:3/8/2021 8:24 AM PST   To:Nurse Practicioner Jacque Song   Subject:Non-Urgent Medical Question    Yes, it is the same. I am attaching. The only thing they did ask was to change page 3; #7 to a duration of 5 days per episode if possible. Ended up in  Thursday with a migraine, fever and sore throat along with the severe pain from the tooth extraction. Covid neg. He thinks its yet another virus. Are there other immunology specialists in [place]? Dr Mixon is leaving and I prefer not to see Dr Reyes again, I am getting sick too often and its wearing me down. Especially with work, I  cannot keep getting sick. I am back at work today but will be written up for excessive absences aside from my FMLA. I believe my depression gets worse during these times and I've been snappy, garrett, mean. I'd Like to trial up my aripiprazole to a full tab HS      ----- Message -----   From:Nurse Practicioner Jacque Song   Sent:3/4/2021 2:44 PM PST   To:Bree Gage   Subject:RE: Non-Urgent Medical Question    Is is just for renewal of what we did last year?      ----- Message -----   From:Bree Gage   Sent:3/4/2021 7:46 AM PST   To:Nurse Practicioner Jacque Song   Subject:Non-Urgent Medical Question    Good morning. I had to call out today and I have an urgent care appointment as I have a super sore throat I've had a mild fever for a week and there's a possibility that the extraction has caused infection & I'm downright miserable with a migraine & nausea. I have an appointment with you today to fill out my FMLA paperwork. Can these be uploaded via [employer] to you?

## 2021-03-09 NOTE — TELEPHONE ENCOUNTER
From: Bree Gage  To: Nurse Practicioner Jacque Song  Sent: 3/8/2021 9:34 AM PST  Subject: Non-Urgent Medical Question    Thank you for that. I really appreciate it!    Attaching another.      ----- Message -----   From:Nurse Ronda Song   Sent:3/8/2021 9:30 AM PST   To:Bree Gage   Subject:RE: Non-Urgent Medical Question    Nate Giron,  Going up on the [facility] is fine.  The forms that you sent to have 4 days per episode listed and I am not able to edit this. Can you send a copy that has not been filled out?  Gillian CHEEMA      ----- Message -----   From:Bree Gage   Sent:3/8/2021 8:24 AM PST   To:Nurse Practicioner Jacque Song   Subject:Non-Urgent Medical Question    Yes, it is the same. I am attaching. The only thing they did ask was to change page 3; #7 to a duration of 5 days per episode if possible. Ended up in  Thursday with a migraine, fever and sore throat along with the severe pain from the tooth extraction. Covid neg. He thinks its yet another virus. Are there other immunology specialists in [place]? Dr Mixon is leaving and I prefer not to see Dr Reyes again, I am getting sick too often and its wearing me down. Especially with work, I cannot keep getting sick. I am back at work today but will be written up for excessive absences aside from my FMLA. I believe my depression gets worse during these times and I've been snappy, garrett, mean. I'd Like to trial up my aripiprazole to a full tab HS      ----- Message -----   From:Nurse Ronda Song   Sent:3/4/2021 2:44 PM PST   To:Bree Gage   Subject:RE: Non-Urgent Medical Question    Is is just for renewal of what we did last year?      ----- Message -----   From:Bree Gage   Sent:3/4/2021 7:46 AM PST   To:Nurse Ronda Song   Subject:Non-Urgent Medical Question    Good morning. I had to call out today and I have an urgent care appointment as I have a super sore throat  I've had a mild fever for a week and there's a possibility that the extraction has caused infection & I'm downright miserable with a migraine & nausea. I have an appointment with you today to fill out my [place] paperwork. Can these be uploaded via [employer] to you?

## 2021-03-10 ENCOUNTER — PATIENT MESSAGE (OUTPATIENT)
Dept: MEDICAL GROUP | Facility: MEDICAL CENTER | Age: 41
End: 2021-03-10

## 2021-03-10 DIAGNOSIS — R77.8 LOW SERUM COMPLEMENT C4: ICD-10-CM

## 2021-03-10 DIAGNOSIS — R76.8 LOW SERUM IGG FOR AGE: ICD-10-CM

## 2021-03-10 NOTE — TELEPHONE ENCOUNTER
"From: Bree Gage  To: Nurse Krysioner Jacque Song  Sent: 3/10/2021 9:27 AM PST  Subject: Non-Urgent Medical Question    Thank you for that - she did order a bit of additional testing re: vaccine immunity and rechecked the IGG, IGA and IGM along with C3/C4 again but that was it and those are available.  I've decided a new referral sounds great. There is [facility] in [place] with A Doc named Hardy who has worked with allergy/asthma and immunology. He seems to be taking new patients.     Thank you again for everything.      ----- Message -----   From:Nurse Practicioner Jacque Song   Sent:3/9/2021 12:05 PM PST   To:Bree Gage   Subject:RE: Non-Urgent Medical Question    There is nothing for us to do about the thyroid lab at this point. I was expecting that she was going to order more testing to look at immune deficiency (not related to the thyroid-low complement and immunoglobulin G). I do not have her note available for review.  I could do a new immunology referral and just specify that you would like to see someone else and see where your insurance will direct you.      ----- Message -----   From:Bree Gage   Sent:3/9/2021 9:44 AM PST   To:Nurse Krysioner Jacque Song   Subject:Non-Urgent Medical Question    She stated \"my Primary should be taking care of this.\" - in regards to the Thyroid labs and was more interested in skin testing me than immunology.   I was a very strange interaction as I have seen her for over 8 years - I used to receive immunotherapy with no change in migraine symptoms. She was more interested in My food allergies and I left a bit angry because she seemed so unwilling. I had met Dr Mixon who was in her office before as a student, but he is now practicing in an unknown location.      ----- Message -----   From:Nurse Ronda Song   Sent:3/9/2021 8:06 AM PST   To:Bree Gage   Subject:RE: Non-Urgent Medical " Question    Your value for the diphtheria was 0.2. It states that values of 0.1 or greater are considered protective.  I am sorry I forgot to address your question about immunology. I really do not know any other it immunologists here in town. What was the reason that you are not wanting to return to their practice?      ----- Message -----   From:Bree Gage   Sent:3/9/2021 8:01 AM PST   To:Nurse Practicioner Jacque Song   Subject:Non-Urgent Medical Question    Thank you very much.  Since I will not be returning to Dr Reyes can you please tell me if I am indeed immune to Diptheria? I cannot interpret the result, they were drawn about 3 weeks ago. PT is scheduled for next week (yay) and I will keep you updated.    Happy Tuesday.      ----- Message -----   From:Nurse Ronda Song   Sent:3/9/2021 6:48 AM PST   To:Bree Gage   Subject:RE: Non-Urgent Medical Question    Nate Giron,  Paperwork was completed and sent in for you yesterday. There is a copy at the  for you to .  Gillian CHEEMA      ----- Message -----   From:Bree Gage   Sent:3/8/2021 9:34 AM PST   To:Nurse Ronda Song   Subject:Non-Urgent Medical Question    Thank you for that. I really appreciate it!    Attaching another.      ----- Message -----   From:Nurse Roopar Jacque Song   Sent:3/8/2021 9:30 AM PST   To:Bree Gage   Subject:RE: Non-Urgent Medical Question    Nate Giron,  Going up on the [facility] is fine.  The forms that you sent to have 4 days per episode listed and I am not able to edit this. Can you send a copy that has not been filled out?  Gillian CHEEMA      ----- Message -----   From:Bree Gage   Sent:3/8/2021 8:24 AM PST   To:Nurse Ronda Song   Subject:Non-Urgent Medical Question    Yes, it is the same. I am attaching. The only thing they did ask was to change page 3; #7 to a duration of 5 days per episode if possible.  Ended up in  Thursday with a migraine, fever and sore throat along with the severe pain from the tooth extraction. Covid neg. He thinks its yet another virus. Are there other immunology specialists in [place]? Dr Mixon is leaving and I prefer not to see Dr Reyes again, I am getting sick too often and its wearing me down. Especially with work, I cannot keep getting sick. I am back at work today but will be written up for excessive absences aside from my FMLA. I believe my depression gets worse during these times and I've been snappy, garrett, mean. I'd Like to trial up my aripiprazole to a full tab HS      ----- Message -----   From:Nurse Practicioner Jacque Song   Sent:3/4/2021 2:44 PM PST   To:Bree Gage   Subject:RE: Non-Urgent Medical Question    Is is just for renewal of what we did last year?      ----- Message -----   From:Bree Gage   Sent:3/4/2021 7:46 AM PST   To:Nurse Practicioner Jacque Song   Subject:Non-Urgent Medical Question    Good morning. I had to call out today and I have an urgent care appointment as I have a super sore throat I've had a mild fever for a week and there's a possibility that the extraction has caused infection & I'm downright miserable with a migraine & nausea. I have an appointment with you today to fill out my FMLA paperwork. Can these be uploaded via [employer] to you?

## 2021-03-15 ENCOUNTER — PATIENT MESSAGE (OUTPATIENT)
Dept: MEDICAL GROUP | Facility: MEDICAL CENTER | Age: 41
End: 2021-03-15

## 2021-03-15 NOTE — TELEPHONE ENCOUNTER
From: Bree Gage  To: Nurse Practicioner Jacque Song  Sent: 3/10/2021 10:43 AM PST  Subject: Non-Urgent Medical Question    I will and thank you very much again, I'm doing a lot of research and have a lot of worry lately regarding these sudden changes. I've lost the weight, eat much better than I used to and I do think the IUD helped with the constipation symptoms. My migraines are just intractable and the sleeping issues are hard to deal with, I've been up at 2:30 everyday for weeks even wearing my CPAP more often & longer. My supervisor asked if I could apply for disability and that just hit me like a ton of bricks.      ----- Message -----   From:Nurse Practicioner Jacque Song   Sent:3/10/2021 10:04 AM PST   To:Bree Gage   Subject:RE: Non-Urgent Medical Question    I will submit a new referral request. Please let me know when you get scheduled.      ----- Message -----   From:Bree Gage   Sent:3/10/2021 9:27 AM PST   To:Nurse Practicioner Jacque Song   Subject:Non-Urgent Medical Question    Thank you for that - she did order a bit of additional testing re: vaccine immunity and rechecked the IGG, IGA and IGM along with C3/C4 again but that was it and those are available.  I've decided a new referral sounds great. There is [facility] in [place] with A Doc named Hardy who has worked with allergy/asthma and immunology. He seems to be taking new patients.     Thank you again for everything.      ----- Message -----   From:Nurse Practicioner Jacque Song   Sent:3/9/2021 12:05 PM PST   To:Bree Gage   Subject:RE: Non-Urgent Medical Question    There is nothing for us to do about the thyroid lab at this point. I was expecting that she was going to order more testing to look at immune deficiency (not related to the thyroid-low complement and immunoglobulin G). I do not have her note available for review.  I could do a new immunology referral and just specify that  "you would like to see someone else and see where your insurance will direct you.      ----- Message -----   From:Bree Gage   Sent:3/9/2021 9:44 AM PST   To:Nurse Practicioner Jacque Song   Subject:Non-Urgent Medical Question    She stated \"my Primary should be taking care of this.\" - in regards to the Thyroid labs and was more interested in skin testing me than immunology.   I was a very strange interaction as I have seen her for over 8 years - I used to receive immunotherapy with no change in migraine symptoms. She was more interested in My food allergies and I left a bit angry because she seemed so unwilling. I had met Dr Mixon who was in her office before as a student, but he is now practicing in an unknown location.      ----- Message -----   From:Nurse Ronda Song   Sent:3/9/2021 8:06 AM PST   To:Bree Gage   Subject:RE: Non-Urgent Medical Question    Your value for the diphtheria was 0.2. It states that values of 0.1 or greater are considered protective.  I am sorry I forgot to address your question about immunology. I really do not know any other it immunologists here in town. What was the reason that you are not wanting to return to their practice?      ----- Message -----   From:Bree Gage   Sent:3/9/2021 8:01 AM PST   To:Nurse Ronda Song   Subject:Non-Urgent Medical Question    Thank you very much.  Since I will not be returning to Dr Reyes can you please tell me if I am indeed immune to Diptheria? I cannot interpret the result, they were drawn about 3 weeks ago. PT is scheduled for next week (yay) and I will keep you updated.    Happy Tuesday.      ----- Message -----   From:Nurse Ronda Song   Sent:3/9/2021 6:48 AM PST   To:Bree Gage   Subject:RE: Non-Urgent Medical Question    Nate Giron,  Paperwork was completed and sent in for you yesterday. There is a copy at the  for you to .  Gillian Song" ANETTE      ----- Message -----   From:Bree Gage   Sent:3/8/2021 9:34 AM PST   To:Nurse Practicioner Jacque Song   Subject:Non-Urgent Medical Question    Thank you for that. I really appreciate it!    Attaching another.      ----- Message -----   From:Nurse Ronda Song   Sent:3/8/2021 9:30 AM PST   To:Bree Gage   Subject:RE: Non-Urgent Medical Question    Nate Giron,  Going up on the [facility] is fine.  The forms that you sent to have 4 days per episode listed and I am not able to edit this. Can you send a copy that has not been filled out?  Gillian CHEEMA      ----- Message -----   From:Bree Gage   Sent:3/8/2021 8:24 AM PST   To:Nurse Practicioner Jacque Song   Subject:Non-Urgent Medical Question    Yes, it is the same. I am attaching. The only thing they did ask was to change page 3; #7 to a duration of 5 days per episode if possible. Ended up in  Thursday with a migraine, fever and sore throat along with the severe pain from the tooth extraction. Covid neg. He thinks its yet another virus. Are there other immunology specialists in [place]? Dr Mixon is leaving and I prefer not to see Dr Reyes again, I am getting sick too often and its wearing me down. Especially with work, I cannot keep getting sick. I am back at work today but will be written up for excessive absences aside from my FMLA. I believe my depression gets worse during these times and I've been snappy, garrett, mean. I'd Like to trial up my aripiprazole to a full tab HS      ----- Message -----   From:Nurse Practicionegrisel Song   Sent:3/4/2021 2:44 PM PST   To:Bree Gage   Subject:RE: Non-Urgent Medical Question    Is is just for renewal of what we did last year?      ----- Message -----   From:Bree Gage   Sent:3/4/2021 7:46 AM PST   To:Nurse Practicioner Jacque Song   Subject:Non-Urgent Medical Question    Good morning. I had to call out today and I have an urgent care appointment  as I have a super sore throat I've had a mild fever for a week and there's a possibility that the extraction has caused infection & I'm downright miserable with a migraine & nausea. I have an appointment with you today to fill out my [place] paperwork. Can these be uploaded via [employer] to you?

## 2021-03-16 RX ORDER — MELOXICAM 15 MG/1
15 TABLET ORAL
Qty: 30 TABLET | Refills: 5 | Status: SHIPPED | OUTPATIENT
Start: 2021-03-16

## 2021-03-16 NOTE — TELEPHONE ENCOUNTER
From: Bree Gage  To: Nurse Marcanoionegrisel Song  Sent: 3/15/2021 3:30 PM PDT  Subject: Non-Urgent Medical Question    I forgot something as I am now out- I believe we spoke about filling my Mobic. After my tooth extraction, I took 800 mg ibuprofen and tylenol but stopped the mobic at that time (around 2/26/2021)  I am no longer taking ibuprofen - may I start up mobic again?      ----- Message -----   From:Nurse Ronda Song   Sent:3/15/2021 11:02 AM PDT   To:Bree Gage   Subject:RE: Non-Urgent Medical Question    I am sorry, Bree, I know that you have had a lot of health struggles over the years. I think you should continue to do as much as you are able, both for your physical and mental health.  I do not really know what the requirements would be to qualify for disability, or if that would be in your best interest long-term.      ----- Message -----   From:Bree Gage   Sent:3/10/2021 10:43 AM PST   To:Nurse Ronda Song   Subject:Non-Urgent Medical Question    I will and thank you very much again, I'm doing a lot of research and have a lot of worry lately regarding these sudden changes. I've lost the weight, eat much better than I used to and I do think the IUD helped with the constipation symptoms. My migraines are just intractable and the sleeping issues are hard to deal with, I've been up at 2:30 everyday for weeks even wearing my CPAP more often & longer. My supervisor asked if I could apply for disability and that just hit me like a ton of bricks.      ----- Message -----   From:Nurse Ronda Song   Sent:3/10/2021 10:04 AM PST   To:Bree Gage   Subject:RE: Non-Urgent Medical Question    I will submit a new referral request. Please let me know when you get scheduled.      ----- Message -----   From:Bree Gage   Sent:3/10/2021 9:27 AM PST   To:Nurse Ronda Song   Subject:Non-Urgent Medical  "Question    Thank you for that - she did order a bit of additional testing re: vaccine immunity and rechecked the IGG, IGA and IGM along with C3/C4 again but that was it and those are available.  I've decided a new referral sounds great. [facility] in [place] with A Doc named Hardy who has worked with allergy/asthma and immunology. He seems to be taking new patients.     Thank you again for everything.      ----- Message -----   From:Nurse Practicioner Jacque Song   Sent:3/9/2021 12:05 PM PST   To:Bree Gage   Subject:RE: Non-Urgent Medical Question    There is nothing for us to do about the thyroid lab at this point. I was expecting that she was going to order more testing to look at immune deficiency (not related to the thyroid-low complement and immunoglobulin G). I do not have her note available for review.  I could do a new immunology referral and just specify that you would like to see someone else and see where your insurance will direct you.      ----- Message -----   From:Bree Gage   Sent:3/9/2021 9:44 AM PST   To:Nurse Krysioner Jacque Song   Subject:Non-Urgent Medical Question    She stated \"my Primary should be taking care of this.\" - in regards to the Thyroid labs and was more interested in skin testing me than immunology.   I was a very strange interaction as I have seen her for over 8 years - I used to receive immunotherapy with no change in migraine symptoms. She was more interested in My food allergies and I left a bit angry because she seemed so unwilling. I had met Dr Mixon who was in her office before as a student, but he is now practicing in an unknown location.      ----- Message -----   From:Nurse Krysioner Jacque Song   Sent:3/9/2021 8:06 AM PST   To:Bree Gage   Subject:RE: Non-Urgent Medical Question    Your value for the diphtheria was 0.2. It states that values of 0.1 or greater are considered protective.  I am sorry I " forgot to address your question about immunology. I really do not know any other it immunologists here in [facility]. What was the reason that you are not wanting to return to their practice?      ----- Message -----   From:Bree Gage   Sent:3/9/2021 8:01 AM PST   To:Nurse Krysionegrisel Song   Subject:Non-Urgent Medical Question    Thank you very much.  Since I will not be returning to Dr Reyes can you please tell me if I am indeed immune to Diptheria? I cannot interpret the result, they were drawn about 3 weeks ago. PT is scheduled for next week (yay) and I will keep you updated.    Happy Tuesday.      ----- Message -----   From:Nurse Practicioner Jacque Song   Sent:3/9/2021 6:48 AM PST   To:Bere Gage   Subject:RE: Non-Urgent Medical Question    Nate Giron,  Paperwork was completed and sent in for you yesterday. There is a copy at the  for you to .  Gillian CHEEMA      ----- Message -----   From:Bree Gage   Sent:3/8/2021 9:34 AM PST   To:Nurse Practicioner Jacque Song   Subject:Non-Urgent Medical Question    Thank you for that. I really appreciate it!    Attaching another.      ----- Message -----   From:Nurse Ronda Song   Sent:3/8/2021 9:30 AM PST   To:Bree Gage   Subject:RE: Non-Urgent Medical Question    Nate Giron,  Going up on the [facility] is fine.  The forms that you sent to have 4 days per episode listed and I am not able to edit this. Can you send a copy that has not been filled out?  Gillian CHEEMA      ----- Message -----   From:Bree Gage   Sent:3/8/2021 8:24 AM PST   To:Nurse Practicioner Jacque Song   Subject:Non-Urgent Medical Question    Yes, it is the same. I am attaching. The only thing they did ask was to change page 3; #7 to a duration of 5 days per episode if possible. Ended up in  Thursday with a migraine, fever and sore throat along with the severe pain from the tooth extraction. Covid neg. He  thinks its yet another virus. Are there other immunology specialists in [place]? Dr Mixon is leaving and I prefer not to see Dr Reyes again, I am getting sick too often and its wearing me down. Especially with work, I cannot keep getting sick. I am back at work today but will be written up for excessive absences aside from my FMLA. I believe my depression gets worse during these times and I've been snappy, garrett, mean. I'd Like to trial up my aripiprazole to a full tab HS      ----- Message -----   From:Nurse Practicioner Jacque Song   Sent:3/4/2021 2:44 PM PST   To:Bree Gage   Subject:RE: Non-Urgent Medical Question    Is is just for renewal of what we did last year?      ----- Message -----   From:Bree Gage   Sent:3/4/2021 7:46 AM PST   To:Nurse Practicioner Jacque Song   Subject:Non-Urgent Medical Question    Good morning. I had to call out today and I have an urgent care appointment as I have a super sore throat I've had a mild fever for a week and there's a possibility that the extraction has caused infection & I'm downright miserable with a migraine & nausea. I have an appointment with you today to fill out my FMLA paperwork. Can these be uploaded via [employer] to you?

## 2021-03-18 ENCOUNTER — IMMUNIZATION (OUTPATIENT)
Dept: FAMILY PLANNING/WOMEN'S HEALTH CLINIC | Facility: IMMUNIZATION CENTER | Age: 41
End: 2021-03-18
Attending: FAMILY MEDICINE
Payer: COMMERCIAL

## 2021-03-18 DIAGNOSIS — Z23 NEED FOR VACCINATION: ICD-10-CM

## 2021-03-18 DIAGNOSIS — Z23 ENCOUNTER FOR VACCINATION: Primary | ICD-10-CM

## 2021-03-18 PROCEDURE — 91301 MODERNA SARS-COV-2 VACCINE: CPT

## 2021-03-18 PROCEDURE — 0011A MODERNA SARS-COV-2 VACCINE: CPT

## 2021-03-19 ENCOUNTER — HOSPITAL ENCOUNTER (OUTPATIENT)
Dept: RADIOLOGY | Facility: MEDICAL CENTER | Age: 41
End: 2021-03-19
Attending: NURSE PRACTITIONER
Payer: COMMERCIAL

## 2021-03-19 ENCOUNTER — HOSPITAL ENCOUNTER (OUTPATIENT)
Dept: LAB | Facility: MEDICAL CENTER | Age: 41
End: 2021-03-19
Attending: NURSE PRACTITIONER
Payer: COMMERCIAL

## 2021-03-19 ENCOUNTER — PATIENT MESSAGE (OUTPATIENT)
Dept: MEDICAL GROUP | Facility: MEDICAL CENTER | Age: 41
End: 2021-03-19

## 2021-03-19 DIAGNOSIS — M79.642 LEFT HAND PAIN: ICD-10-CM

## 2021-03-19 DIAGNOSIS — M25.511 ACUTE PAIN OF RIGHT SHOULDER: ICD-10-CM

## 2021-03-19 DIAGNOSIS — M25.532 ACUTE PAIN OF LEFT WRIST: ICD-10-CM

## 2021-03-19 LAB — URATE SERPL-MCNC: 4.2 MG/DL (ref 1.9–8.2)

## 2021-03-19 PROCEDURE — 73030 X-RAY EXAM OF SHOULDER: CPT | Mod: RT

## 2021-03-19 PROCEDURE — 73130 X-RAY EXAM OF HAND: CPT | Mod: LT

## 2021-03-19 PROCEDURE — 84550 ASSAY OF BLOOD/URIC ACID: CPT

## 2021-03-19 PROCEDURE — 36415 COLL VENOUS BLD VENIPUNCTURE: CPT

## 2021-03-19 PROCEDURE — 73110 X-RAY EXAM OF WRIST: CPT | Mod: LT

## 2021-03-24 DIAGNOSIS — R40.0 DAYTIME SOMNOLENCE: ICD-10-CM

## 2021-03-24 RX ORDER — MODAFINIL 200 MG/1
200 TABLET ORAL DAILY
Qty: 30 TABLET | Refills: 1 | Status: SHIPPED | OUTPATIENT
Start: 2021-03-24 | End: 2021-04-22 | Stop reason: SDUPTHER

## 2021-03-24 NOTE — TELEPHONE ENCOUNTER
Have we ever prescribed this med? Yes.  If yes, what date? 01/06/21    Last OV: 01/19/21 with Garima CHEEMA    Next OV: 04/22/21 with Garima CHEEMA    DX:Hypersomnolence (G47.10)    Medications:   Requested Prescriptions     Pending Prescriptions Disp Refills   • modafinil (PROVIGIL) 200 MG Tab [Pharmacy Med Name: MODAFINIL 200 MG TABLET]  1     Sig: TAKE 1 TAB BY MOUTH EVERY DAY FOR 60 DAYS.

## 2021-04-01 ENCOUNTER — OFFICE VISIT (OUTPATIENT)
Dept: MEDICAL GROUP | Facility: MEDICAL CENTER | Age: 41
End: 2021-04-01
Payer: COMMERCIAL

## 2021-04-01 VITALS
SYSTOLIC BLOOD PRESSURE: 110 MMHG | RESPIRATION RATE: 20 BRPM | TEMPERATURE: 98.3 F | HEIGHT: 61 IN | OXYGEN SATURATION: 99 % | HEART RATE: 75 BPM | BODY MASS INDEX: 31.05 KG/M2 | DIASTOLIC BLOOD PRESSURE: 70 MMHG | WEIGHT: 164.46 LBS

## 2021-04-01 DIAGNOSIS — G47.33 OSA ON CPAP: ICD-10-CM

## 2021-04-01 DIAGNOSIS — F33.1 MODERATE EPISODE OF RECURRENT MAJOR DEPRESSIVE DISORDER (HCC): ICD-10-CM

## 2021-04-01 DIAGNOSIS — G25.81 RESTLESS LEG SYNDROME: ICD-10-CM

## 2021-04-01 DIAGNOSIS — M54.2 NECK PAIN ON RIGHT SIDE: ICD-10-CM

## 2021-04-01 DIAGNOSIS — G43.119 BASILAR ARTERY MIGRAINE, INTRACTABLE: ICD-10-CM

## 2021-04-01 PROCEDURE — 99214 OFFICE O/P EST MOD 30 MIN: CPT | Performed by: NURSE PRACTITIONER

## 2021-04-01 RX ORDER — GABAPENTIN 300 MG/1
300 CAPSULE ORAL
Qty: 90 CAPSULE | Refills: 0 | Status: SHIPPED | OUTPATIENT
Start: 2021-04-01 | End: 2021-06-14

## 2021-04-01 RX ORDER — RIMEGEPANT SULFATE 75 MG/75MG
1 TABLET, ORALLY DISINTEGRATING ORAL PRN
Qty: 10 TABLET | Refills: 5 | Status: SHIPPED | OUTPATIENT
Start: 2021-04-01 | End: 2021-09-17 | Stop reason: SDUPTHER

## 2021-04-01 RX ORDER — FLUTICASONE PROPIONATE 50 MCG
1 SPRAY, SUSPENSION (ML) NASAL DAILY
COMMUNITY
End: 2021-09-17

## 2021-04-01 ASSESSMENT — FIBROSIS 4 INDEX: FIB4 SCORE: 0.94

## 2021-04-01 NOTE — ASSESSMENT & PLAN NOTE
Chronic issue currently managed with Abilify and venlafaxine.  Her Abilify dose was recently increased from 2.5 mg daily to 5 mg.  She is unsure if this is having any impact on her mood, still suffering from depressive symptoms.  She does note that since increase of Abilify she feels more muscular tension, clenching in the jaw on the hands, difficulty relaxing.  She is interested in possible medication change.  With her chronic pain issues she may benefit from Cymbalta.  We will consider this in the future although was starting a new prescription for her RLS we will hold off on medication change today.

## 2021-04-02 ENCOUNTER — TELEPHONE (OUTPATIENT)
Dept: NEUROLOGY | Facility: MEDICAL CENTER | Age: 41
End: 2021-04-02

## 2021-04-02 ENCOUNTER — HOSPITAL ENCOUNTER (OUTPATIENT)
Facility: MEDICAL CENTER | Age: 41
End: 2021-04-02
Attending: NURSE PRACTITIONER
Payer: COMMERCIAL

## 2021-04-02 PROCEDURE — 87624 HPV HI-RISK TYP POOLED RSLT: CPT

## 2021-04-02 PROCEDURE — 88175 CYTOPATH C/V AUTO FLUID REDO: CPT

## 2021-04-02 NOTE — TELEPHONE ENCOUNTER
I have spoken with the patient to make her aware that her plan requires a prior authorization. It has been submitted via CM: Bree Gage ([place]: [id number])    The plan requires 72 hours ([facility] at [phone]).\

## 2021-04-05 LAB — AMBIGUOUS DTTM AMBI4: NORMAL

## 2021-04-05 NOTE — ASSESSMENT & PLAN NOTE
This is been a persistent problem initially discussed 2/13/2021.  Insidious onset with no specific history of injury although she did have some dental work and thinks that the positioning she was in for this may have been an aggravating factor.  She continues to feel very tight and sore in the right neck, her neck pain is increased her migraines.  Aggravated by any movement and tender to touch.  She has started physical therapy, is unsure if this is helping.  She is also tried massage, ice, heat.  She is on meloxicam and using methocarbamol as needed  No change in range of motion, pain radiating into the arms.  No numbness, tingling, weakness in upper extremities

## 2021-04-05 NOTE — PROGRESS NOTES
Subjective:     Chief Complaint   Patient presents with   • Follow-Up     neck pain, depression     Bree Gage is a 40 y.o. female here today to follow up on:    Moderate episode of recurrent major depressive disorder (HCC)  Chronic issue currently managed with Abilify and venlafaxine.  Her Abilify dose was recently increased from 2.5 mg daily to 5 mg.  She is unsure if this is having any impact on her mood, still suffering from depressive symptoms.  She does note that since increase of Abilify she feels more muscular tension, clenching in the jaw on the hands, difficulty relaxing.  She is interested in possible medication change.  With her chronic pain issues she may benefit from Cymbalta.  We will consider this in the future although was starting a new prescription for her RLS we will hold off on medication change today.    Neck pain on right side  This is been a persistent problem initially discussed 2/13/2021.  Insidious onset with no specific history of injury although she did have some dental work and thinks that the positioning she was in for this may have been an aggravating factor.  She continues to feel very tight and sore in the right neck, her neck pain is increased her migraines.  Aggravated by any movement and tender to touch.  She has started physical therapy, is unsure if this is helping.  She is also tried massage, ice, heat.  She is on meloxicam and using methocarbamol as needed  No change in range of motion, pain radiating into the arms.  No numbness, tingling, weakness in upper extremities    ROME on CPAP  Reports that she is consistently using her CPAP but still not sleeping well.  She has difficulty with restless leg which is contributing, feels that she is having jerking movements at times.  Her partner is now sleeping in another room because she has been so restless through the night.  She had been given trial of Mirapex but was reluctant to try this due to side effects.  She is  interested in an alternative medication       Current medicines (including changes today)  Current Outpatient Medications   Medication Sig Dispense Refill   • fluticasone (FLONASE) 50 MCG/ACT nasal spray Administer 1 Spray into affected nostril(S) every day.     • gabapentin (NEURONTIN) 300 MG Cap Take 1 capsule by mouth at bedtime as needed. 90 capsule 0   • ARIPiprazole (ABILIFY) 5 MG tablet Take 1 tablet by mouth every day. 90 tablet 3   • modafinil (PROVIGIL) 200 MG Tab TAKE 1 TAB BY MOUTH EVERY DAY FOR 60 DAYS. 30 tablet 1   • meloxicam (MOBIC) 15 MG tablet Take 1 tablet by mouth 1 time a day as needed. 30 tablet 5   • methocarbamol (ROBAXIN) 500 MG Tab Take 500 mg by mouth 3 times a day as needed.     • ondansetron (ZOFRAN) 4 MG Tab tablet Take 1 Tab by mouth every four hours as needed for Nausea/Vomiting. 20 Tab 0   • predniSONE (DELTASONE) 10 MG Tab 6 TAB DAILY FOR 2 DAYS, THEN REDUCE BY 1 TAB EVERY OTHER DAY UNTIL GONE 45 Tab 0   • albuterol 108 (90 Base) MCG/ACT Aero Soln inhalation aerosol Inhale 2 Puffs by mouth every four hours as needed for Shortness of Breath. 1 Each 5   • venlafaxine XR (EFFEXOR XR) 75 MG CAPSULE SR 24 HR Take 1 Cap by mouth every day. 90 Cap 3   • omeprazole (PRILOSEC) 20 MG delayed-release capsule Take 20 mg by mouth every day.     • Diclofenac Sodium 1 % Gel Apply 2-4 g to skin as directed 4 times a day as needed. 1 Tube 2   • Cetirizine HCl 10 MG Cap Take 10 mg by mouth.     • acetaminophen (TYLENOL) 500 MG TABS Take 500-1,000 mg by mouth every 6 hours as needed. Up to 8 a day     • Rimegepant Sulfate (NURTEC) 75 MG TABLET DISPERSIBLE Take 1 tablet by mouth as needed. 1 tab at headache onset; repeat in 24 hours 10 tablet 5   • ibuprofen (MOTRIN) 800 MG Tab        No current facility-administered medications for this visit.     She  has a past medical history of Alcohol addiction (HCC), Allergy, Angina (last 2 years), Apnea, sleep, Arthritis (current), Back pain, Bronchitis,  "Chickenpox, Constipation, Daytime sleepiness, Depression, Dizziness, Drug addiction (HCC), EBV positive mononucleosis syndrome, Eye pain, Fatigue, Fever, Fracture of finger, Frequent headaches, Gasping for breath, GERD (gastroesophageal reflux disease), Hemorrhoids, Hyperlipidemia, Hypothyroidism, IBS (irritable bowel syndrome), Indigestion, Influenza, Iron deficiency anemia, LBP (low back pain), Morning headache, Painful joint, Rectal fissure, Snoring, Sore muscles, Varicose veins, and Wears glasses. She also has no past medical history of Arrhythmia, Cancer (HCC), CATARACT, Congestive heart failure (HCC), COPD, Diabetes, Dialysis, Glaucoma, Heart murmur, Heart valve disease, Hypertension, Jaundice, Myocardial infarct (HCC), Other specified symptom associated with female genital organs, Pacemaker, Personal history of venous thrombosis and embolism, Pneumonia, Renal disorder, Rheumatic fever, Seizure (HCC), Stroke (HCC), Unspecified hemorrhagic conditions, or Unspecified urinary incontinence.    ROS included above     Objective:     /70 (BP Location: Left arm, Patient Position: Sitting, BP Cuff Size: Adult)   Pulse 75   Temp 36.8 °C (98.3 °F) (Temporal)   Resp 20   Ht 1.549 m (5' 1\")   Wt 74.6 kg (164 lb 7.4 oz)   SpO2 99%  Body mass index is 31.08 kg/m².     Physical Exam:  General: Alert, oriented in no acute distress.  Eye contact is good, speech is normal, affect calm  Lungs: clear to auscultation bilaterally, normal effort, no wheeze/ rhonchi/ rales.  CV: regular rate and rhythm, S1, S2, no murmur  MS: Right-sided scapula and trapezius tenderness.  Neck with full range of motion.  No point tenderness over cervical spinous process, no deformity appreciated  Ext: no edema, color normal, vascularity normal, temperature normal    Assessment and Plan:   The following treatment plan was discussed   1. Moderate episode of recurrent major depressive disorder (HCC)   she feels that the dose increase on " Abilify has caused her to feel more tense, clenching in the hands and the jaw at times.  No significant change in mood.  Possible switch to Cymbalta discussed, will hold off on medication change today but may pursue in the next few weeks.   2. Restless leg syndrome   persistent problem contributing to poor quality of sleep.  Treatment options reviewed.  We will start trial of gabapentin at bedtime, she will let me know if this is helpful  gabapentin (NEURONTIN) 300 MG Cap   3. Neck pain on right side   persistent right-sided muscular neck pain without radicular symptoms.  Currently in physical therapy although not yet improving.  She is seeing [facility] in the past, encouraged to schedule again.  May benefit from trigger point injections although will need input from the specialist regarding this.  Continue current medication and PT for now, prescription for topical analgesics sent   4. ROME on CPAP   followed by pulmonology, continue CPAP use       Followup: 2 to 3 weeks regarding medication         Please note that this dictation was created using voice recognition software. I have worked with consultants from the vendor as well as technical experts from [employer] to optimize the interface. I have made every reasonable attempt to correct obvious errors, but I expect that there are errors of grammar and possibly content that I did not discover before finalizing the note.

## 2021-04-05 NOTE — ASSESSMENT & PLAN NOTE
Reports that she is consistently using her CPAP but still not sleeping well.  She has difficulty with restless leg which is contributing, feels that she is having jerking movements at times.  Her partner is now sleeping in another room because she has been so restless through the night.  She had been given trial of Mirapex but was reluctant to try this due to side effects.  She is interested in an alternative medication

## 2021-04-06 ENCOUNTER — PATIENT MESSAGE (OUTPATIENT)
Dept: MEDICAL GROUP | Facility: MEDICAL CENTER | Age: 41
End: 2021-04-06

## 2021-04-06 LAB
CYTOLOGY REG CYTOL: NORMAL
HPV HR 12 DNA CVX QL NAA+PROBE: NEGATIVE
HPV16 DNA SPEC QL NAA+PROBE: NEGATIVE
HPV18 DNA SPEC QL NAA+PROBE: NEGATIVE
SPECIMEN SOURCE: NORMAL

## 2021-04-06 NOTE — TELEPHONE ENCOUNTER
From: Bree Gage  To: Nurse Practitioner Jacque Song  Sent: 4/6/2021 1:45 PM PDT  Subject: Non-Urgent Medical Question    Thank you again.    Re: the compounding topical you rx'ed - it is not covered under [facility]... grr. Without insurance the compound topical is $    Darn. Shall I stick with the Voltaren OTC?      ----- Message -----   From:Nurse Practitioner Jacque Song   Sent:4/6/2021 1:24 PM PDT   To:Bree Gage   Subject:RE: Non-Urgent Medical Question    I think using the Aquaphor very often this can be your best option. That works better than most ointments they have seen. Vaseline is also very protective.      ----- Message -----   From:Bree Gage   Sent:4/6/2021 10:22 AM PDT   To:Nurse Practitioner Jacque Song   Subject:Non-Urgent Medical Question    I knew there was something I forgot @ our last appointment. I believe I have eczema on my lips. I have used aquaphor, neosporin, shea butter, hydrocortisone & so many balms & still have peeling, cracking to the point of bleeding. Do you recommend anything?

## 2021-04-19 NOTE — TELEPHONE ENCOUNTER
Received request via: Pharmacy    Was the patient seen in the last year in this department? Yes    Does the patient have an active prescription (recently filled or refills available) for medication(s) requested? No     Patient last seen on 10/09/2020 , medication last filled on 12/23/2020.    Patient has not scheduled a follow up visit .

## 2021-04-20 RX ORDER — PREDNISONE 10 MG/1
TABLET ORAL
Qty: 45 TABLET | Refills: 0 | Status: SHIPPED | OUTPATIENT
Start: 2021-04-20 | End: 2021-06-24

## 2021-04-22 ENCOUNTER — OFFICE VISIT (OUTPATIENT)
Dept: SLEEP MEDICINE | Facility: MEDICAL CENTER | Age: 41
End: 2021-04-22
Payer: COMMERCIAL

## 2021-04-22 VITALS
HEIGHT: 61 IN | HEART RATE: 64 BPM | SYSTOLIC BLOOD PRESSURE: 108 MMHG | OXYGEN SATURATION: 93 % | BODY MASS INDEX: 30.21 KG/M2 | WEIGHT: 160 LBS | RESPIRATION RATE: 16 BRPM | DIASTOLIC BLOOD PRESSURE: 68 MMHG

## 2021-04-22 DIAGNOSIS — J45.20 MILD INTERMITTENT ASTHMA WITHOUT COMPLICATION: ICD-10-CM

## 2021-04-22 DIAGNOSIS — Z87.891 FORMER SMOKER: ICD-10-CM

## 2021-04-22 DIAGNOSIS — R40.0 DAYTIME SOMNOLENCE: ICD-10-CM

## 2021-04-22 DIAGNOSIS — R63.4 WEIGHT LOSS: ICD-10-CM

## 2021-04-22 DIAGNOSIS — G47.33 OSA ON CPAP: ICD-10-CM

## 2021-04-22 PROBLEM — J45.909 ASTHMA IN ADULT WITHOUT COMPLICATION: Chronic | Status: ACTIVE | Noted: 2018-05-23

## 2021-04-22 PROCEDURE — 99214 OFFICE O/P EST MOD 30 MIN: CPT | Performed by: NURSE PRACTITIONER

## 2021-04-22 RX ORDER — FLUTICASONE PROPIONATE 44 MCG
2 AEROSOL WITH ADAPTER (GRAM) INHALATION 2 TIMES DAILY
Qty: 1 EACH | Refills: 5 | Status: SHIPPED | OUTPATIENT
Start: 2021-04-22 | End: 2021-05-06

## 2021-04-22 RX ORDER — MODAFINIL 200 MG/1
200 TABLET ORAL DAILY
Qty: 30 TABLET | Refills: 2 | Status: SHIPPED | OUTPATIENT
Start: 2021-04-22 | End: 2021-06-21

## 2021-04-22 ASSESSMENT — FIBROSIS 4 INDEX: FIB4 SCORE: 0.94

## 2021-04-22 NOTE — PROGRESS NOTES
Chief Complaint   Patient presents with   • Apnea     last seen 1/19/2021       HPI:  Bree Gage is a 40 y.o. year old female here today for follow-up on ROME and daytime somnolence.  Last OV 1/19/21    Since last OV, started on mirapex due to RLS symptoms but noted increased muscular pain and stopped. She is now using neurontin 300mg qhs prn per pain management with improved sleep.  Compliance report 3/23/2021 through 4/21/2021 notes 67% compliance, average nightly use 4 hours 16 minutes, minimal mask leak with reduced AHI 2.3/h.  Reviewed finds with patient.  Overall patient needs to improve consistent nightly use.  This is an ongoing issue.  This could increase her daytime somnolence. She confirms understanding and willing to improve consistency; she notes severe migraine the last week and unable to tolerate therapy at that time.  She remains on modafinil 200 mg daily and insurance denied having 100 mg tablets on hand to use in the afternoon if ongoing somnolence. She feels this significantly improves her daytime symptoms.  She continues to lose 10lbs and working on nutrition and feeling better.   She is concerned about hx of low C3 and IgG levels; prior patient of Dr. Reyes and overall not addressed but she would like second opinion from Dr. Perdomo. She notes being off her flovent for 2 mos with return of wheezing mainly in AM. She has increased PARUL use 2-3x's per week now.   She is not obtaining supplies from DME and frustrated as well.    PMH: fibromyalgia, migraines and chronic fatigue. Prior hospitalization 2009 for pneumonia with respiratory failure requiring tracheostomy and ARF.    Sleep history:  PSG 10/19/17 noted AHI 7.2/hr and O2 mira 91%. MSLT noted sleep onset latency of 9min and 26 second with no sleep onset REM periods. She was treated with Provigil with improvement in symptoms.  PSG 11/3/19 noted AHI 7.6/hr and supine AHi 23.5/hr with O2 mira 89%. She was started on APAP 5-12cm with  improvement in symptoms. Current device obtained 11/2019.  CNOX on Pap 1/28/2021 noted mean SPO2 of 89% and less than 88% for 9 minutes of the night.  O2 mira appears to be artifact.  Continue CPAP at current pressure.    ROS: As per HPI and otherwise negative if not stated.    Past Medical History:   Diagnosis Date   • Alcohol addiction (Formerly [facility])    • Allergy    • Angina last 2 years    pt attributes to asthma   • Apnea, sleep    • Arthritis current    osteoarthritis   • Back pain    • Bronchitis     4-5x/year   • Chickenpox    • Constipation    • Daytime sleepiness    • Depression    • Dizziness    • Drug addiction (Formerly [facility])    • EBV positive mononucleosis syndrome    • Eye pain    • Fatigue    • Fever    • Fracture of finger     rt index x 3   • Frequent headaches    • Gasping for breath    • GERD (gastroesophageal reflux disease)    • Hemorrhoids    • Hyperlipidemia    • Hypothyroidism    • IBS (irritable bowel syndrome)    • Indigestion    • Influenza    • Iron deficiency anemia    • LBP (low back pain)     si joint epidural 2008   • Morning headache    • Painful joint    • Rectal fissure    • Snoring    • Sore muscles    • Varicose veins    • Wears glasses        Past Surgical History:   Procedure Laterality Date   • TRACHEOSTOMY  10/29/2009    Performed by NIDHI LARA at SURGERY [facility]   • GASTROSTOMY LAPAROSCOPIC  10/29/2009    Performed by NIDHI LARA at SURGERY [facility]       Family History   Problem Relation Age of Onset   • Hypertension Mother    • Hyperlipidemia Mother    • Alcohol/Drug Mother    • Psychiatric Illness Mother    • Hypertension Father    • Hyperlipidemia Father    • Alcohol/Drug Father    • Psychiatric Illness Father    • Cancer Maternal Grandfather    • Heart Disease Maternal Grandfather    • Heart Disease Paternal Grandfather    • Alcohol/Drug Paternal Grandfather        Social History     Socioeconomic History   • Marital status:      Spouse name: Not on  file   • Number of children: Not on file   • Years of education: Not on file   • Highest education level: Not on file   Occupational History   • Not on file   Tobacco Use   • Smoking status: Former Smoker     Packs/day: 0.50     Years: 19.00     Pack years: 9.50     Types: Cigarettes     Start date: 1998     Quit date: 8/15/2015     Years since quittin.6   • Smokeless tobacco: Never Used   • Tobacco comment: Quit 2015   Substance and Sexual Activity   • Alcohol use: Yes     Alcohol/week: 0.0 oz     Comment: one or two a week   • Drug use: Not Currently     Types: Methamphetamines     Comment: clean since   Rehab ,  speed   • Sexual activity: Yes     Partners: Male     Birth control/protection: Pill   Other Topics Concern   •  Service No   • Blood Transfusions Yes   • Caffeine Concern No   • Occupational Exposure Yes   • Hobby Hazards No   • Sleep Concern No   • Stress Concern No   • Weight Concern Yes   • Special Diet No   • Back Care No   • Exercise No   • Bike Helmet No   • Seat Belt Yes   • Self-Exams Yes   Social History Narrative   • Not on file     Social Determinants of Health     Financial Resource Strain:    • Difficulty of Paying Living Expenses:    Food Insecurity:    • Worried About Running Out of Food in the Last Year:    • Ran Out of Food in the Last Year:    Transportation Needs:    • Lack of Transportation (Medical):    • Lack of Transportation (Non-Medical):    Physical Activity:    • Days of Exercise per Week:    • Minutes of Exercise per Session:    Stress:    • Feeling of Stress :    Social Connections:    • Frequency of Communication with Friends and Family:    • Frequency of Social Gatherings with Friends and Family:    • Attends Rastafarian Services:    • Active Member of Clubs or Organizations:    • Attends Club or Organization Meetings:    • Marital Status:    Intimate Partner Violence:    • Fear of Current or Ex-Partner:    • Emotionally Abused:    •  "Physically Abused:    • Sexually Abused:        Allergies as of 04/22/2021 - Reviewed 04/22/2021   Allergen Reaction Noted   • Ciprofloxacin  11/08/2010   • Compazine Unspecified 01/09/2018   • Diclofenac  09/03/2009   • Epinephrine  09/03/2009   • Latex  09/03/2009        Vitals:  /68 (BP Location: Left arm, Patient Position: Sitting, BP Cuff Size: Adult)   Pulse 64   Resp 16   Ht 1.549 m (5' 1\")   Wt 72.6 kg (160 lb)   SpO2 93%     Current medications as of today   Current Outpatient Medications   Medication Sig Dispense Refill   • predniSONE (DELTASONE) 10 MG Tab TAKE 6 TABLETS BY MOUTH EVERY DAY FOR 2 DAYS AND THEN REDUCE BY 1 TAB EVERY OTHER DAY UNTIL GONE 45 tablet 0   • fluticasone (FLONASE) 50 MCG/ACT nasal spray Administer 1 Spray into affected nostril(S) every day.     • gabapentin (NEURONTIN) 300 MG Cap Take 1 capsule by mouth at bedtime as needed. 90 capsule 0   • Rimegepant Sulfate (NURTEC) 75 MG TABLET DISPERSIBLE Take 1 tablet by mouth as needed. 1 tab at headache onset; repeat in 24 hours 10 tablet 5   • ARIPiprazole (ABILIFY) 5 MG tablet Take 1 tablet by mouth every day. 90 tablet 3   • modafinil (PROVIGIL) 200 MG Tab TAKE 1 TAB BY MOUTH EVERY DAY FOR 60 DAYS. 30 tablet 1   • meloxicam (MOBIC) 15 MG tablet Take 1 tablet by mouth 1 time a day as needed. 30 tablet 5   • methocarbamol (ROBAXIN) 500 MG Tab Take 500 mg by mouth 3 times a day as needed.     • ondansetron (ZOFRAN) 4 MG Tab tablet Take 1 Tab by mouth every four hours as needed for Nausea/Vomiting. 20 Tab 0   • albuterol 108 (90 Base) MCG/ACT Aero Soln inhalation aerosol Inhale 2 Puffs by mouth every four hours as needed for Shortness of Breath. 1 Each 5   • venlafaxine XR (EFFEXOR XR) 75 MG CAPSULE SR 24 HR Take 1 Cap by mouth every day. 90 Cap 3   • omeprazole (PRILOSEC) 20 MG delayed-release capsule Take 20 mg by mouth every day.     • Diclofenac Sodium 1 % Gel Apply 2-4 g to skin as directed 4 times a day as needed. 1 Tube 2 "   • Cetirizine HCl 10 MG Cap Take 10 mg by mouth.     • acetaminophen (TYLENOL) 500 MG TABS Take 500-1,000 mg by mouth every 6 hours as needed. Up to 8 a day       No current facility-administered medications for this visit.         Physical Exam:   Gen:           Alert and oriented, No apparent distress. Mood and affect appropriate, normal interaction with examiner.  Eyes:          PERRL, EOM intact, sclere white, conjunctive moist.  Ears:          Not examined.   Hearing:     Grossly intact.  Nose:          Normal, no lesions or deformities.  Dentition:    mask  Oropharynx:   mask  Mallampati Classification: mask  Neck:        Supple, trachea midline, no masses.  Respiratory Effort: No intercostal retractions or use of accessory muscles.   Lung Auscultation:      Clear to auscultation bilaterally; no rales, rhonchi or wheezing.  CV:            Regular rate and rhythm. No murmurs, rubs or gallops.  Abd:           Not examined.   Lymphadenopathy: Not examined.  Gait and Station: Normal.  Digits and Nails: No clubbing, cyanosis, petechiae, or nodes.   Cranial Nerves: II-XII grossly intact.  Skin:        No rashes, lesions or ulcers noted.               Ext:           No cyanosis or edema.      Assessment:  1. ROME on CPAP     2. Daytime somnolence     3. Weight loss     4. BMI 30.0-30.9,adult  Height And Weight   5. Former smoker         Immunizations:    Flu:10/2020  Pneumovax 23:2010  Prevnar 13:not due  COVID-19: 3/18/21, pending    Plan:  1. ROME is improved on therapy; advise consistent nightly use to improve daytime somnolence overall to then continue treatment with Provigil 200QAM.   Will contact DME about not getting supplies; otherwise switch to [employer]ell[employer]  2. Rx Flovent HFA to restart therapy for asthma; spacer provided  Referral to Dr. Perdomo for second opinion on mildly low C4/IgG but mainly control of asthma symptoms  May remain off singulair  3. F/u with neurology for migraines; has pending appt with   Bette  4. Discussed sleep hygiene  5. Continue with ongoing weight loss through diet/exercise  6. F/u in 3 mos to check compliance, sooner if needed.    Please note that this dictation was created using voice recognition software. I have made every reasonable attempt to correct obvious errors, but it is possible there are errors of grammar and possibly content that I did not discover before finalizing the note.

## 2021-04-29 ENCOUNTER — PATIENT MESSAGE (OUTPATIENT)
Dept: MEDICAL GROUP | Facility: MEDICAL CENTER | Age: 41
End: 2021-04-29

## 2021-04-30 ENCOUNTER — HOSPITAL ENCOUNTER (OUTPATIENT)
Facility: MEDICAL CENTER | Age: 41
End: 2021-04-30
Attending: NURSE PRACTITIONER
Payer: COMMERCIAL

## 2021-04-30 ENCOUNTER — OFFICE VISIT (OUTPATIENT)
Dept: URGENT CARE | Facility: CLINIC | Age: 41
End: 2021-04-30
Payer: COMMERCIAL

## 2021-04-30 VITALS
HEIGHT: 61 IN | DIASTOLIC BLOOD PRESSURE: 66 MMHG | OXYGEN SATURATION: 97 % | TEMPERATURE: 97.3 F | BODY MASS INDEX: 31.26 KG/M2 | SYSTOLIC BLOOD PRESSURE: 110 MMHG | WEIGHT: 165.6 LBS | HEART RATE: 105 BPM | RESPIRATION RATE: 16 BRPM

## 2021-04-30 DIAGNOSIS — G44.89 OTHER HEADACHE SYNDROME: ICD-10-CM

## 2021-04-30 DIAGNOSIS — M79.10 MYALGIA: ICD-10-CM

## 2021-04-30 DIAGNOSIS — J02.9 SORE THROAT: ICD-10-CM

## 2021-04-30 DIAGNOSIS — R00.0 TACHYCARDIA: ICD-10-CM

## 2021-04-30 DIAGNOSIS — R50.9 LOW GRADE FEVER: ICD-10-CM

## 2021-04-30 DIAGNOSIS — J30.1 SEASONAL ALLERGIC RHINITIS DUE TO POLLEN: ICD-10-CM

## 2021-04-30 DIAGNOSIS — B34.9 VIRAL ILLNESS: ICD-10-CM

## 2021-04-30 DIAGNOSIS — R42 DIZZINESS: ICD-10-CM

## 2021-04-30 LAB
BASOPHILS # BLD AUTO: 0.3 % (ref 0–1.8)
BASOPHILS # BLD: 0.03 K/UL (ref 0–0.12)
EOSINOPHIL # BLD AUTO: 0.06 K/UL (ref 0–0.51)
EOSINOPHIL NFR BLD: 0.7 % (ref 0–6.9)
ERYTHROCYTE [DISTWIDTH] IN BLOOD BY AUTOMATED COUNT: 41.5 FL (ref 35.9–50)
FLUAV+FLUBV AG SPEC QL IA: NEGATIVE
HCT VFR BLD AUTO: 38.5 % (ref 37–47)
HETEROPH AB SER QL LA: NEGATIVE
HGB BLD-MCNC: 12.5 G/DL (ref 12–16)
IMM GRANULOCYTES # BLD AUTO: 0.02 K/UL (ref 0–0.11)
IMM GRANULOCYTES NFR BLD AUTO: 0.2 % (ref 0–0.9)
INT CON NEG: NORMAL
INT CON POS: NORMAL
LYMPHOCYTES # BLD AUTO: 4.27 K/UL (ref 1–4.8)
LYMPHOCYTES NFR BLD: 47.3 % (ref 22–41)
MCH RBC QN AUTO: 29.7 PG (ref 27–33)
MCHC RBC AUTO-ENTMCNC: 32.5 G/DL (ref 33.6–35)
MCV RBC AUTO: 91.4 FL (ref 81.4–97.8)
MONOCYTES # BLD AUTO: 0.88 K/UL (ref 0–0.85)
MONOCYTES NFR BLD AUTO: 9.7 % (ref 0–13.4)
NEUTROPHILS # BLD AUTO: 3.77 K/UL (ref 2–7.15)
NEUTROPHILS NFR BLD: 41.8 % (ref 44–72)
NRBC # BLD AUTO: 0 K/UL
NRBC BLD-RTO: 0 /100 WBC
PLATELET # BLD AUTO: 274 K/UL (ref 164–446)
PMV BLD AUTO: 10.8 FL (ref 9–12.9)
RBC # BLD AUTO: 4.21 M/UL (ref 4.2–5.4)
S PYO AG THROAT QL: NEGATIVE
WBC # BLD AUTO: 9 K/UL (ref 4.8–10.8)

## 2021-04-30 PROCEDURE — 86308 HETEROPHILE ANTIBODY SCREEN: CPT | Performed by: NURSE PRACTITIONER

## 2021-04-30 PROCEDURE — U0003 INFECTIOUS AGENT DETECTION BY NUCLEIC ACID (DNA OR RNA); SEVERE ACUTE RESPIRATORY SYNDROME CORONAVIRUS 2 (SARS-COV-2) (CORONAVIRUS DISEASE [COVID-19]), AMPLIFIED PROBE TECHNIQUE, MAKING USE OF HIGH THROUGHPUT TECHNOLOGIES AS DESCRIBED BY CMS-2020-01-R: HCPCS

## 2021-04-30 PROCEDURE — 99214 OFFICE O/P EST MOD 30 MIN: CPT | Performed by: NURSE PRACTITIONER

## 2021-04-30 PROCEDURE — 87880 STREP A ASSAY W/OPTIC: CPT | Performed by: NURSE PRACTITIONER

## 2021-04-30 PROCEDURE — 87804 INFLUENZA ASSAY W/OPTIC: CPT | Performed by: NURSE PRACTITIONER

## 2021-04-30 PROCEDURE — 85025 COMPLETE CBC W/AUTO DIFF WBC: CPT

## 2021-04-30 PROCEDURE — U0005 INFEC AGEN DETEC AMPLI PROBE: HCPCS

## 2021-04-30 ASSESSMENT — ENCOUNTER SYMPTOMS
NECK PAIN: 1
NERVOUS/ANXIOUS: 0
ORTHOPNEA: 0
HEADACHES: 1
WEAKNESS: 0
DIZZINESS: 0
CHILLS: 1
SWOLLEN GLANDS: 1
MYALGIAS: 1
EYE PAIN: 0
FEVER: 1
SHORTNESS OF BREATH: 0
FATIGUE: 1
COUGH: 0
VOMITING: 0
VERTIGO: 1
ABDOMINAL PAIN: 0
NAUSEA: 0
ARTHRALGIAS: 1
SORE THROAT: 1

## 2021-04-30 ASSESSMENT — FIBROSIS 4 INDEX: FIB4 SCORE: 0.94

## 2021-05-01 NOTE — PROGRESS NOTES
Subjective:   Bree Gage is a 40 y.o. female who presents for Fatigue (2x weeks ago, pos mono, mild fever 99.1, 19x day migraine )       Fatigue  This is a new problem. The current episode started 1 to 4 weeks ago. The problem occurs constantly. The problem has been waxing and waning. Associated symptoms include arthralgias, chills, congestion, fatigue, a fever, headaches, myalgias, neck pain, a sore throat, swollen glands and vertigo. Pertinent negatives include no abdominal pain, chest pain, coughing, nausea, rash, vomiting or weakness. Nothing aggravates the symptoms. She has tried rest, sleep, position changes, acetaminophen and NSAIDs for the symptoms. The treatment provided mild relief.     Pt presents for evaluation of a new problem, reports several symptoms to include 2-week history of migraine, low-grade fever, fatigue, diffuse myalgia, sore throat, dizziness, and noted heart racing.  Patient has had waxing and waning of the symptoms over the past 2 weeks, is concerned about possible mono as well as other infectious disease process.  Has had her first Covid shot, she canceled her second due to her acute illness.  Has a history of recurring mono, with 8 episodes of mono over the past 5 years.  She is currently being evaluated for an autoimmune process.  She has known seasonal allergies as well.  Denies any known ill contacts, fiancé at home is healthy and well.    Review of Systems   Constitutional: Positive for chills, fatigue, fever and malaise/fatigue.   HENT: Positive for congestion and sore throat. Negative for ear pain.    Eyes: Negative for pain.   Respiratory: Negative for cough and shortness of breath.    Cardiovascular: Negative for chest pain, orthopnea and leg swelling.   Gastrointestinal: Negative for abdominal pain, nausea and vomiting.   Genitourinary: Negative for dysuria.   Musculoskeletal: Positive for arthralgias, joint pain, myalgias and neck pain.   Skin: Negative for rash.    Neurological: Positive for vertigo and headaches. Negative for dizziness and weakness.   Endo/Heme/Allergies: Positive for environmental allergies.   Psychiatric/Behavioral: The patient is not nervous/anxious.    All other systems reviewed and are negative.      MEDS:   Current Outpatient Medications:   •  modafinil (PROVIGIL) 200 MG Tab, Take 1 tablet by mouth every day for 60 days., Disp: 30 tablet, Rfl: 2  •  fluticasone (FLOVENT HFA) 44 MCG/ACT Aerosol, Inhale 2 Puffs 2 times a day. Use spacer. Rinse mouth after each use., Disp: 1 Each, Rfl: 5  •  predniSONE (DELTASONE) 10 MG Tab, TAKE 6 TABLETS BY MOUTH EVERY DAY FOR 2 DAYS AND THEN REDUCE BY 1 TAB EVERY OTHER DAY UNTIL GONE, Disp: 45 tablet, Rfl: 0  •  fluticasone (FLONASE) 50 MCG/ACT nasal spray, Administer 1 Spray into affected nostril(S) every day., Disp: , Rfl:   •  gabapentin (NEURONTIN) 300 MG Cap, Take 1 capsule by mouth at bedtime as needed., Disp: 90 capsule, Rfl: 0  •  Rimegepant Sulfate (NURTEC) 75 MG TABLET DISPERSIBLE, Take 1 tablet by mouth as needed. 1 tab at headache onset; repeat in 24 hours, Disp: 10 tablet, Rfl: 5  •  ARIPiprazole (ABILIFY) 5 MG tablet, Take 1 tablet by mouth every day., Disp: 90 tablet, Rfl: 3  •  meloxicam (MOBIC) 15 MG tablet, Take 1 tablet by mouth 1 time a day as needed., Disp: 30 tablet, Rfl: 5  •  methocarbamol (ROBAXIN) 500 MG Tab, Take 500 mg by mouth 3 times a day as needed., Disp: , Rfl:   •  ondansetron (ZOFRAN) 4 MG Tab tablet, Take 1 Tab by mouth every four hours as needed for Nausea/Vomiting., Disp: 20 Tab, Rfl: 0  •  albuterol 108 (90 Base) MCG/ACT Aero Soln inhalation aerosol, Inhale 2 Puffs by mouth every four hours as needed for Shortness of Breath., Disp: 1 Each, Rfl: 5  •  venlafaxine XR (EFFEXOR XR) 75 MG CAPSULE SR 24 HR, Take 1 Cap by mouth every day., Disp: 90 Cap, Rfl: 3  •  omeprazole (PRILOSEC) 20 MG delayed-release capsule, Take 20 mg by mouth every day., Disp: , Rfl:   •  acetaminophen  "(TYLENOL) 500 MG TABS, Take 500-1,000 mg by mouth every 6 hours as needed. Up to 8 a day, Disp: , Rfl:   ALLERGIES:   Allergies   Allergen Reactions   • Ciprofloxacin    • Compazine Unspecified     agitation   • Diclofenac      Upset     • Epinephrine      palpitations   • Latex        Patient's PMH, SocHx, SurgHx, FamHx, Drug allergies and medications were reviewed.     Objective:   /66 (BP Location: Left arm, Patient Position: Sitting, BP Cuff Size: Adult)   Pulse (!) 105   Temp 36.3 °C (97.3 °F) (Temporal)   Resp 16   Ht 1.549 m (5' 1\")   Wt 75.1 kg (165 lb 9.6 oz)   SpO2 97%   BMI 31.29 kg/m²     Physical Exam  Vitals and nursing note reviewed.   Constitutional:       General: She is awake.      Appearance: Normal appearance. She is well-developed and normal weight.   HENT:      Head: Normocephalic and atraumatic.      Right Ear: Tympanic membrane, ear canal and external ear normal.      Left Ear: Tympanic membrane, ear canal and external ear normal.      Nose: Nose normal.      Mouth/Throat:      Lips: Pink.      Mouth: Mucous membranes are moist.      Pharynx: Oropharynx is clear. Uvula midline. Posterior oropharyngeal erythema present.   Eyes:      Extraocular Movements: Extraocular movements intact.      Conjunctiva/sclera: Conjunctivae normal.      Pupils: Pupils are equal, round, and reactive to light.   Neck:      Thyroid: No thyromegaly.      Trachea: Trachea normal.   Cardiovascular:      Rate and Rhythm: Normal rate and regular rhythm.      Pulses: Normal pulses.      Heart sounds: Normal heart sounds, S1 normal and S2 normal.   Pulmonary:      Effort: Pulmonary effort is normal. No respiratory distress.      Breath sounds: Normal breath sounds. No wheezing, rhonchi or rales.   Abdominal:      General: Bowel sounds are normal.      Palpations: Abdomen is soft.   Musculoskeletal:         General: Normal range of motion.      Cervical back: Full passive range of motion without pain, normal " range of motion and neck supple.   Lymphadenopathy:      Cervical: No cervical adenopathy.   Skin:     General: Skin is warm and dry.      Capillary Refill: Capillary refill takes less than 2 seconds.   Neurological:      General: No focal deficit present.      Mental Status: She is alert and oriented to person, place, and time.      Gait: Gait is intact.   Psychiatric:         Attention and Perception: Attention and perception normal.         Mood and Affect: Mood normal.         Speech: Speech normal.         Behavior: Behavior normal. Behavior is cooperative.         Thought Content: Thought content normal.         Judgment: Judgment normal.         Assessment/Plan:   Assessment    1. Viral illness    2. Seasonal allergic rhinitis due to pollen    3. Other headache syndrome  - POCT Rapid Strep A  - POCT Influenza A/B  - POCT Mononucleosis (mono)  - CBC WITH DIFFERENTIAL; Future  - SARS-CoV-2 PCR (24 hour In-House): Collect NP swab in VTM; Future    4. Dizziness  - POCT Rapid Strep A  - POCT Influenza A/B  - POCT Mononucleosis (mono)  - CBC WITH DIFFERENTIAL; Future  - SARS-CoV-2 PCR (24 hour In-House): Collect NP swab in VTM; Future    5. Sore throat  - POCT Rapid Strep A  - POCT Influenza A/B  - POCT Mononucleosis (mono)  - CBC WITH DIFFERENTIAL; Future  - SARS-CoV-2 PCR (24 hour In-House): Collect NP swab in VTM; Future    6. Myalgia  - POCT Rapid Strep A  - POCT Influenza A/B  - POCT Mononucleosis (mono)  - CBC WITH DIFFERENTIAL; Future  - SARS-CoV-2 PCR (24 hour In-House): Collect NP swab in VTM; Future    7. Low grade fever  - POCT Rapid Strep A  - POCT Influenza A/B  - POCT Mononucleosis (mono)  - CBC WITH DIFFERENTIAL; Future  - SARS-CoV-2 PCR (24 hour In-House): Collect NP swab in VTM; Future    8. Tachycardia  - CBC WITH DIFFERENTIAL; Future  - SARS-CoV-2 PCR (24 hour In-House): Collect NP swab in VTM; Future        Vital signs stable at today's acute urgent care visit.  Reviewed test results that were  completed in the clinic, all of which are negative.  Patient is concerned about other physiologic process, will obtain CBC.  Additionally, will check for Covid, due to her having only one of the Covid vaccines thus far.  Discussed management options (risks, benefits, and alternatives to treatment).  Continue over-the-counter antihistamine and decongestant.    Advised the patient to follow-up with the primary care provider for recheck, reevaluation, and/or consideration of further management if necessary. Return to urgent care with any worsening symptoms or if there is no improvement in their current condition. Red flags discussed and indications to immediately call 911 or present to the ED.  All questions were encouraged and answered to the patient's satisfaction and understanding, and they agree to the plan of care.     I personally reviewed prior external notes and test results pertinent to today's visit.  I have independently reviewed and interpreted all diagnostics ordered during this urgent care acute visit. Time spent evaluating this patient was a minimum of 30 minutes and includes preparing for visit, counseling/education, exam, evaluation, obtaining history, and ordering lab/test/procedures.      Please note that this dictation was created using voice recognition software. I have made a reasonable attempt to correct obvious errors, but I expect that there are errors of grammar and possibly content that I did not discover before finalizing the note.

## 2021-05-06 DIAGNOSIS — J45.20 MILD INTERMITTENT ASTHMA WITHOUT COMPLICATION: ICD-10-CM

## 2021-05-06 RX ORDER — FLUTICASONE PROPIONATE 44 MCG
AEROSOL WITH ADAPTER (GRAM) INHALATION
Qty: 3 EACH | Refills: 3 | Status: SHIPPED | OUTPATIENT
Start: 2021-05-06 | End: 2021-06-14

## 2021-05-21 ENCOUNTER — PATIENT MESSAGE (OUTPATIENT)
Dept: MEDICAL GROUP | Facility: MEDICAL CENTER | Age: 41
End: 2021-05-21

## 2021-05-21 DIAGNOSIS — R76.8 LOW SERUM IGG FOR AGE: ICD-10-CM

## 2021-05-21 DIAGNOSIS — R77.8 LOW SERUM COMPLEMENT C4: ICD-10-CM

## 2021-05-21 DIAGNOSIS — J45.30 MILD PERSISTENT ASTHMA IN ADULT WITHOUT COMPLICATION: ICD-10-CM

## 2021-05-21 NOTE — PATIENT COMMUNICATION
Message from pt : requesting referral       Bree Gage would like to request a referral.  Reason: Original Referral was for the Wrong Dr  Requested provider: SHER Perdomo  Comment:  I no longer want to see Dr Tobar

## 2021-05-27 ENCOUNTER — IMMUNIZATION (OUTPATIENT)
Dept: OTHER | Facility: MEDICAL CENTER | Age: 41
End: 2021-05-27
Payer: COMMERCIAL

## 2021-05-27 ENCOUNTER — PATIENT MESSAGE (OUTPATIENT)
Dept: MEDICAL GROUP | Facility: MEDICAL CENTER | Age: 41
End: 2021-05-27

## 2021-05-27 DIAGNOSIS — F33.1 MODERATE EPISODE OF RECURRENT MAJOR DEPRESSIVE DISORDER (HCC): ICD-10-CM

## 2021-05-27 DIAGNOSIS — Z23 ENCOUNTER FOR VACCINATION: Primary | ICD-10-CM

## 2021-05-27 PROCEDURE — 91301 MODERNA SARS-COV-2 VACCINE: CPT

## 2021-05-27 PROCEDURE — 0012A MODERNA SARS-COV-2 VACCINE: CPT

## 2021-06-14 ENCOUNTER — OFFICE VISIT (OUTPATIENT)
Dept: URGENT CARE | Facility: CLINIC | Age: 41
End: 2021-06-14
Payer: COMMERCIAL

## 2021-06-14 ENCOUNTER — APPOINTMENT (OUTPATIENT)
Dept: RADIOLOGY | Facility: IMAGING CENTER | Age: 41
End: 2021-06-14
Attending: PHYSICIAN ASSISTANT
Payer: COMMERCIAL

## 2021-06-14 VITALS
DIASTOLIC BLOOD PRESSURE: 70 MMHG | BODY MASS INDEX: 31.19 KG/M2 | OXYGEN SATURATION: 99 % | HEIGHT: 61 IN | SYSTOLIC BLOOD PRESSURE: 110 MMHG | RESPIRATION RATE: 14 BRPM | HEART RATE: 86 BPM | WEIGHT: 165.2 LBS | TEMPERATURE: 98.6 F

## 2021-06-14 DIAGNOSIS — R11.0 NAUSEA: ICD-10-CM

## 2021-06-14 DIAGNOSIS — K59.00 CONSTIPATION, UNSPECIFIED CONSTIPATION TYPE: ICD-10-CM

## 2021-06-14 DIAGNOSIS — Z87.440 HISTORY OF UTI: ICD-10-CM

## 2021-06-14 DIAGNOSIS — R10.84 GENERALIZED ABDOMINAL PAIN: ICD-10-CM

## 2021-06-14 PROBLEM — F51.02 ADJUSTMENT INSOMNIA: Status: ACTIVE | Noted: 2018-10-05

## 2021-06-14 LAB
APPEARANCE UR: CLEAR
BILIRUB UR STRIP-MCNC: NEGATIVE MG/DL
COLOR UR AUTO: YELLOW
GLUCOSE UR STRIP.AUTO-MCNC: NEGATIVE MG/DL
INT CON NEG: NORMAL
INT CON POS: NORMAL
KETONES UR STRIP.AUTO-MCNC: NEGATIVE MG/DL
LEUKOCYTE ESTERASE UR QL STRIP.AUTO: NEGATIVE
NITRITE UR QL STRIP.AUTO: NEGATIVE
PH UR STRIP.AUTO: 7 [PH] (ref 5–8)
POC URINE PREGNANCY TEST: NEGATIVE
PROT UR QL STRIP: NEGATIVE MG/DL
RBC UR QL AUTO: NEGATIVE
SP GR UR STRIP.AUTO: 1.02
UROBILINOGEN UR STRIP-MCNC: 0.2 MG/DL

## 2021-06-14 PROCEDURE — 74019 RADEX ABDOMEN 2 VIEWS: CPT | Mod: TC | Performed by: PHYSICIAN ASSISTANT

## 2021-06-14 PROCEDURE — 81002 URINALYSIS NONAUTO W/O SCOPE: CPT | Performed by: PHYSICIAN ASSISTANT

## 2021-06-14 PROCEDURE — 81025 URINE PREGNANCY TEST: CPT | Performed by: PHYSICIAN ASSISTANT

## 2021-06-14 PROCEDURE — 99214 OFFICE O/P EST MOD 30 MIN: CPT | Performed by: PHYSICIAN ASSISTANT

## 2021-06-14 RX ORDER — BACLOFEN 20 MG
1 TABLET ORAL
COMMUNITY
End: 2021-07-29

## 2021-06-14 ASSESSMENT — ENCOUNTER SYMPTOMS
VOMITING: 1
ABDOMINAL PAIN: 1
HEADACHES: 1
DOUBLE VISION: 0
PHOTOPHOBIA: 0
CHILLS: 0
FEVER: 0
NAUSEA: 1
BLURRED VISION: 0
SENSORY CHANGE: 0
TINGLING: 0
FOCAL WEAKNESS: 0
CONSTIPATION: 1

## 2021-06-14 ASSESSMENT — FIBROSIS 4 INDEX: FIB4 SCORE: 0.91

## 2021-06-14 NOTE — PROGRESS NOTES
"Subjective:   Bree Gage  is a 40 y.o. female who presents for Constipation (Onset period, nausea, vomiting and migraine, 6/7/21. Started a new medication. Constipated for 1 week now, nausea, abd cramping and took milk of magnesia with some relief. ) and UTI (Recurrent, occurrs before and after period. Pressure and urgency, leakage. Onset Friday.)      Patient identity confirmed using two patient identifiers of last name and date of birth.    Patient presents to urgent care with issues with constipation and generalized abdominal pain related to concerns of chronic migraine and possible UTI.     Patient reports history of chronic migraine related to her menses with onset of headache with menses on 6/7/21 which has persisted. Headache is 6/10 with photosensitivity and nausea.     She reports onset of constipation 1 week ago with no relief with OTC treatment. She reports she is unable to take Miralax stating that it \"makes me sick\". She has tried Bisacodyl, stool softener, fiber and magnesium with no relief. Reports \"grape cluster\" stool small amount yesterday, none since. She is passing gas. She does have history of prior abdominal surgery for G-tube placement which has since been removed.     Patient also reports concern for possible UTI with pressure and urgency. She states that she typically will get a UTI with each period.     She is under the care of headache specialist and has recently started Nurtec for migraine and is unsure if this could be contributing to her headache.     + nausea, 4 episodes of vomiting today.         Constipation  Associated symptoms include abdominal pain, nausea and vomiting. Pertinent negatives include no fever.   UTI  Associated symptoms include abdominal pain, headaches, nausea and vomiting. Pertinent negatives include no chills or fever.     Review of Systems   Constitutional: Positive for malaise/fatigue. Negative for chills and fever.   Eyes: Negative for blurred vision, " "double vision and photophobia.   Gastrointestinal: Positive for abdominal pain, constipation, nausea and vomiting.   Genitourinary: Positive for frequency and urgency. Negative for dysuria.   Neurological: Positive for headaches. Negative for tingling, sensory change and focal weakness.   All other systems reviewed and are negative.      Allergies   Allergen Reactions   • Ciprofloxacin    • Compazine Unspecified     agitation   • Diclofenac      Upset     • Epinephrine      palpitations   • Latex      Reviewed past medical, surgical , social and family history.  Reviewed prescription and over-the-counter medications with patient and electronic health record today.     Objective:   /70 (BP Location: Left arm, Patient Position: Sitting, BP Cuff Size: Adult)   Pulse 86   Temp 37 °C (98.6 °F) (Temporal)   Resp 14   Ht 1.549 m (5' 1\")   Wt 74.9 kg (165 lb 3.2 oz)   SpO2 99%   BMI 31.21 kg/m²   Physical Exam  Vitals reviewed.   Constitutional:       General: She is not in acute distress.     Appearance: She is well-developed. She is not ill-appearing or toxic-appearing.   HENT:      Head: Normocephalic and atraumatic.      Right Ear: Tympanic membrane, ear canal and external ear normal.      Left Ear: Tympanic membrane, ear canal and external ear normal.      Nose: Nose normal.      Mouth/Throat:      Lips: Pink. No lesions.      Mouth: Mucous membranes are moist.      Pharynx: Oropharynx is clear. Uvula midline. No oropharyngeal exudate.   Eyes:      General: Lids are normal.      Extraocular Movements: Extraocular movements intact.      Conjunctiva/sclera: Conjunctivae normal.      Pupils: Pupils are equal, round, and reactive to light.   Cardiovascular:      Rate and Rhythm: Normal rate and regular rhythm.      Heart sounds: Normal heart sounds. No murmur heard.   No friction rub. No gallop.    Pulmonary:      Effort: Pulmonary effort is normal. No respiratory distress.      Breath sounds: Normal breath " sounds.   Abdominal:      General: Bowel sounds are decreased. There is no distension.      Palpations: Abdomen is soft. There is no mass.      Tenderness: There is generalized abdominal tenderness. There is no guarding or rebound.   Musculoskeletal:         General: No tenderness or deformity. Normal range of motion.      Cervical back: Normal range of motion and neck supple.   Lymphadenopathy:      Head:      Right side of head: No submental, submandibular or tonsillar adenopathy.      Left side of head: No submental, submandibular or tonsillar adenopathy.      Cervical: No cervical adenopathy.      Upper Body:      Right upper body: No supraclavicular adenopathy.      Left upper body: No supraclavicular adenopathy.   Skin:     General: Skin is warm and dry.      Findings: No rash.   Neurological:      Mental Status: She is alert and oriented to person, place, and time.      Cranial Nerves: Cranial nerves are intact. No cranial nerve deficit.      Sensory: Sensation is intact. No sensory deficit.      Motor: Motor function is intact.      Coordination: Coordination is intact. Coordination normal.      Gait: Gait is intact.   Psychiatric:         Attention and Perception: Attention normal.         Mood and Affect: Mood and affect normal.         Speech: Speech normal.         Behavior: Behavior normal. Behavior is cooperative.         Thought Content: Thought content normal.         Judgment: Judgment normal.           Assessment/Plan:   1. Generalized abdominal pain  - [id number] VIEWS; Future  - POCT Urinalysis  - POCT Pregnancy    2. Constipation, unspecified constipation type  - [id number] VIEWS; Future    3. Chronic migraine    4. Nausea  - POCT Urinalysis  - POCT Pregnancy    5. History of UTI  - POCT Urinalysis  - POCT Pregnancy    Results for orders placed or performed in visit on 06/14/21   POCT Urinalysis   Result Value Ref Range    POC Color YELLOW Negative    POC Appearance CLEAR Negative    POC  Leukocyte Esterase NEGATIVE Negative    POC Nitrites NEGATIVE Negative    POC Urobiligen 0.2 Negative (0.2) mg/dL    POC Protein NEGATIVE Negative mg/dL    POC Urine PH 7.0 5.0 - 8.0    POC Blood NEGATIVE Negative    POC Specific Gravity 1.025 <1.005 - >1.030    POC Ketones NEGATIVE Negative mg/dL    POC Bilirubin NEGATIVE Negative mg/dL    POC Glucose NEGATIVE Negative mg/dL   POCT Pregnancy   Result Value Ref Range    POC Urine Pregnancy Test NEGATIVE Negative    Internal Control Positive Valid     Internal Control Negative Valid        Xray is obtained, read by radiologist and reviewed by myself with findings as follows:      RADIOLOGY RESULTS   [id number] VIEWS    Result Date: 6/14/2021 6/14/2021 12:09 PM HISTORY/REASON FOR EXAM:  Abdominal Pain; abdominal pain, constipation, prior abdominal surgery. TECHNIQUE/EXAM DESCRIPTION AND NUMBER OF VIEWS:  2 view(s) of the abdomen. COMPARISON: Abdominal x-ray 12/20/2012 FINDINGS: BOWEL: The abdominal bowel gas pattern is normal. No abnormal calcifications are identified. BONES: There is levoconvex lumbar curvature LUNGS: The lung bases are clear. Intrauterine device projects over the pelvis     Normal bowel gas pattern         Patient is nontoxic appearing, afebrile with generalized abdominal pain without guarding or rebound. Suspect constipation. Will trial magnesium citrate (start with 1/2 bottle, if no relief after 4-6 hours consider repeat other 1/2 bottle). If no relief or worsening pain: to ER.     F/U with headache specialist and PCP.       Differential diagnosis, natural history, supportive care, and indications for immediate follow-up discussed.     Red flag warning symptoms and strict ER/follow-up precautions given.  The patient demonstrated a good understanding and agreed with the treatment plan.    Upon entering exam room I ensured patient was wearing a mask.  This provider wore appropriate PPE throughout entire visit.  Patient wore mask entire visit  except for a brief period while examining oropharynx.    Please note that this note was created using voice recognition speech to text software. Every effort has been made to correct obvious errors.  However, I expect there are errors of grammar and possibly context that were not discovered prior to finalizing the note  SRaymundo Mccann PA-C

## 2021-06-18 ENCOUNTER — PATIENT MESSAGE (OUTPATIENT)
Dept: MEDICAL GROUP | Facility: MEDICAL CENTER | Age: 41
End: 2021-06-18

## 2021-06-18 DIAGNOSIS — K59.09 CHRONIC CONSTIPATION: ICD-10-CM

## 2021-06-18 DIAGNOSIS — F33.1 MODERATE EPISODE OF RECURRENT MAJOR DEPRESSIVE DISORDER (HCC): ICD-10-CM

## 2021-06-18 RX ORDER — VENLAFAXINE HYDROCHLORIDE 75 MG/1
CAPSULE, EXTENDED RELEASE ORAL
Qty: 90 CAPSULE | Refills: 3 | Status: SHIPPED | OUTPATIENT
Start: 2021-06-18 | End: 2021-07-29

## 2021-06-21 ENCOUNTER — PATIENT MESSAGE (OUTPATIENT)
Dept: MEDICAL GROUP | Facility: MEDICAL CENTER | Age: 41
End: 2021-06-21

## 2021-06-21 DIAGNOSIS — K59.09 CHRONIC CONSTIPATION: ICD-10-CM

## 2021-06-21 RX ORDER — LINACLOTIDE 72 UG/1
1 CAPSULE, GELATIN COATED ORAL DAILY
Qty: 30 CAPSULE | Refills: 2 | Status: SHIPPED | OUTPATIENT
Start: 2021-06-21

## 2021-06-21 NOTE — TELEPHONE ENCOUNTER
From: Bree Gage  To: Nurse Practitioner Jacque Song  Sent: 6/21/2021 9:33 AM PDT  Subject: Non-Urgent Medical Question    I tried Fiber gummies for 2 months straight with probiotics daily. I stopped Eating potatoes and a lot of bread products & upped water intake. I also still take a stool softener with a stimulant laxative up to 3 times a week and I am now going to be trying a collegen supplement.     My immunology appointment is on Thursday and I'd like to ask to check my C4, C3 IGg's, TSH and anything else that you might think including the CBC because I currently have another sign of probable bronchitis and/or another upper respiratory infection that started last Friday & I'm running another fever with Super sore throat.       ----- Message -----   From:Nurse Practitioner Jacque Song   Sent:6/21/2021 8:57 AM PDT   To:Bree Gage   Subject:RE: Non-Urgent Medical Question    Nate Giron,  What are you currently doing to manage the constipation? Have you tried prescription linzess in the past?  I can start a referral.  Gillian CHEEMA      ----- Message -----   From:Bree Gage   Sent:6/18/2021 4:01 AM PDT   To:Nurse Practitioner Jacque Song   Subject:Non-Urgent Medical Question    Good morning. I ended up in Urgent care with another migraine x 2 weeks but the constipation was what got me. Possibly too much Zofran or New Nurtec.  I am asking for a new referral to [facility] please.

## 2021-06-21 NOTE — TELEPHONE ENCOUNTER
From: Bree Gage  To: Nurse Practitioner Jacque Song  Sent: 6/21/2021 9:46 AM PDT  Subject: Non-Urgent Medical Question    The Miralax causes major nausea, I really did try to keep it down. I've tried multiple bottles over the last few years with the same response.    Thank you, I think I would like to try low dose linzess & stop the softeners & laxative.     On another note, they saw the curve in my spine on my abdominal xray in  & I will be making another appointment with physiology as my neck pain has not subsided.      ----- Message -----   From:Nurse Practitioner Jacque Song   Sent:6/21/2021 9:39 AM PDT   To:Bree Gage   Subject:RE: Non-Urgent Medical Question    Did you try MiraLAX every day? I will be my next suggestion. If that does not work you could perhaps try Linzess.  I would suggest waiting until your appointment Thursday before doing any blood work, they may want to order more testing that would be beyond my knowledge on the subject.       ----- Message -----   From:Bree Gage   Sent:6/21/2021 9:33 AM PDT   To:Nurse Practitioner Jacque Song   Subject:Non-Urgent Medical Question    I tried Fiber gummies for 2 months straight with probiotics daily. I stopped Eating potatoes and a lot of bread products & upped water intake. I also still take a stool softener with a stimulant laxative up to 3 times a week and I am now going to be trying a collegen supplement.     My immunology appointment is on Thursday and I'd like to ask to check my C4, C3 IGg's, TSH and anything else that you might think including the CBC because I currently have another sign of probable bronchitis and/or another upper respiratory infection that started last Friday & I'm running another fever with Super sore throat.       ----- Message -----   From:Nurse Practitioner Jacque Song   Sent:6/21/2021 8:57 AM PDT   To:Bree Gage   Subject:RE: Non-Urgent Medical Question    Nate Giron,  What are  you currently doing to manage the constipation? Have you tried prescription linzess in the past?  I can start a referral.  Gillian CHEEMA      ----- Message -----   From:Bree Gage   Sent:6/18/2021 4:01 AM PDT   To:Nurse Practitioner Jacque Song   Subject:Non-Urgent Medical Question    Good morning. I ended up in Urgent care with another migraine x 2 weeks but the constipation was what got me. Possibly too much Zofran or New Nurtec.  I am asking for a new referral to [facility] please.

## 2021-06-24 RX ORDER — PREDNISONE 10 MG/1
TABLET ORAL
Qty: 45 TABLET | Refills: 0 | Status: SHIPPED | OUTPATIENT
Start: 2021-06-24 | End: 2021-09-23

## 2021-06-24 NOTE — TELEPHONE ENCOUNTER
Received request via: Pharmacy    Was the patient seen in the last year in this department? Yes    Does the patient have an active prescription (recently filled or refills available) for medication(s) requested? No     Patient last seen on 10/09/2020 , medication last filled on 04/20/2021.

## 2021-06-28 ENCOUNTER — TELEPHONE (OUTPATIENT)
Dept: PHYSICAL MEDICINE AND REHAB | Facility: MEDICAL CENTER | Age: 41
End: 2021-06-28

## 2021-06-28 ENCOUNTER — PATIENT MESSAGE (OUTPATIENT)
Dept: MEDICAL GROUP | Facility: MEDICAL CENTER | Age: 41
End: 2021-06-28

## 2021-06-28 DIAGNOSIS — F33.1 MODERATE EPISODE OF RECURRENT MAJOR DEPRESSIVE DISORDER (HCC): ICD-10-CM

## 2021-06-28 RX ORDER — DULOXETIN HYDROCHLORIDE 30 MG/1
30 CAPSULE, DELAYED RELEASE ORAL DAILY
Qty: 30 CAPSULE | Refills: 1 | Status: SHIPPED | OUTPATIENT
Start: 2021-06-28 | End: 2021-09-17

## 2021-06-28 NOTE — TELEPHONE ENCOUNTER
Shaye from Specialty scheduling called and stated that the Pt want to transfer care from Dr. Pruitt to Dr. Elias.    Please advise    Thank you  Wilma

## 2021-06-28 NOTE — PROGRESS NOTES
Spoke with patient by phone, plan for switch from venlafaxine to Cymbalta reviewed.  She will keep me updated on her progress.

## 2021-06-28 NOTE — TELEPHONE ENCOUNTER
From: Bree Gage  To: Nurse Practitioner Jacque Song  Sent: 6/28/2021 11:32 AM PDT  Subject: Non-Urgent Medical Question    I met with Dr Perdomo and it had been deemed necessary to Rinse my sinuses daily with Budesonide due to the chronic viral infections and topical Mupriocin with Symbicort PRN. The rinse is painful!! I'm floored for about an hour after doing it but I feel clearer so far but I know its going to get worse before better.    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 This is terrifying.    Id like to start the Cymbalta. I am ready to make a switch as we spoke about before . I'd appreciate it.

## 2021-06-28 NOTE — TELEPHONE ENCOUNTER
Prior to scheduling, I will discuss with Dr Pruitt to help decide what is best for the patient.         Dr Elias

## 2021-06-30 NOTE — TELEPHONE ENCOUNTER
I reviewed the notes from Dr Pruitt and I also reviewed this with her. Dr Pruitt and I practice very similarly in this type of case. I think it would be best for Ms Gage to be seen outside of [facility] to get a true second opinion.     Dr Elias.

## 2021-07-08 ENCOUNTER — OFFICE VISIT (OUTPATIENT)
Dept: MEDICAL GROUP | Facility: MEDICAL CENTER | Age: 41
End: 2021-07-08
Payer: COMMERCIAL

## 2021-07-08 VITALS
WEIGHT: 161 LBS | OXYGEN SATURATION: 97 % | HEIGHT: 61 IN | DIASTOLIC BLOOD PRESSURE: 70 MMHG | HEART RATE: 83 BPM | RESPIRATION RATE: 16 BRPM | BODY MASS INDEX: 30.4 KG/M2 | SYSTOLIC BLOOD PRESSURE: 110 MMHG | TEMPERATURE: 98 F

## 2021-07-08 DIAGNOSIS — M54.2 NECK PAIN ON RIGHT SIDE: ICD-10-CM

## 2021-07-08 DIAGNOSIS — F33.1 MODERATE EPISODE OF RECURRENT MAJOR DEPRESSIVE DISORDER (HCC): ICD-10-CM

## 2021-07-08 DIAGNOSIS — G47.33 OSA ON CPAP: Chronic | ICD-10-CM

## 2021-07-08 DIAGNOSIS — R09.81 SINUS CONGESTION: ICD-10-CM

## 2021-07-08 PROCEDURE — 99213 OFFICE O/P EST LOW 20 MIN: CPT | Performed by: NURSE PRACTITIONER

## 2021-07-08 RX ORDER — MODAFINIL 200 MG/1
TABLET ORAL
COMMUNITY
Start: 2021-07-07 | End: 2021-09-08 | Stop reason: SDUPTHER

## 2021-07-08 RX ORDER — BUDESONIDE 0.5 MG/2ML
500 INHALANT ORAL
COMMUNITY
Start: 2021-06-24

## 2021-07-08 ASSESSMENT — PATIENT HEALTH QUESTIONNAIRE - PHQ9
5. POOR APPETITE OR OVEREATING: 0 - NOT AT ALL
SUM OF ALL RESPONSES TO PHQ QUESTIONS 1-9: 13
CLINICAL INTERPRETATION OF PHQ2 SCORE: 3

## 2021-07-08 ASSESSMENT — ANXIETY QUESTIONNAIRES
1. FEELING NERVOUS, ANXIOUS, OR ON EDGE: NEARLY EVERY DAY
6. BECOMING EASILY ANNOYED OR IRRITABLE: MORE THAN HALF THE DAYS
2. NOT BEING ABLE TO STOP OR CONTROL WORRYING: SEVERAL DAYS
5. BEING SO RESTLESS THAT IT IS HARD TO SIT STILL: MORE THAN HALF THE DAYS
GAD7 TOTAL SCORE: 13
7. FEELING AFRAID AS IF SOMETHING AWFUL MIGHT HAPPEN: SEVERAL DAYS
4. TROUBLE RELAXING: NEARLY EVERY DAY
3. WORRYING TOO MUCH ABOUT DIFFERENT THINGS: SEVERAL DAYS

## 2021-07-08 ASSESSMENT — FIBROSIS 4 INDEX: FIB4 SCORE: 0.91

## 2021-07-08 NOTE — ASSESSMENT & PLAN NOTE
Here today to follow-up on medication change from venlafaxine to Cymbalta which she just started earlier this month.  So far tolerating medication well and thinks that it has had a positive impact on her energy level.  She is still having some depressive feelings but overall thinks things are moving in the right direction  No SI/HI, no substance abuse, no self-injurious behavior  Depression Screening    Little interest or pleasure in doing things?  1 - several days   Feeling down, depressed , or hopeless? 2 - more than half the days   Trouble falling or staying asleep, or sleeping too much?  1 - several days   Feeling tired or having little energy?  3 - nearly every day   Poor appetite or overeating?  0 - not at all   Feeling bad about yourself - or that you are a failure or have let yourself or your family down? 1 - several days   Trouble concentrating on things, such as reading the newspaper or watching television? 3 - nearly every day   Moving or speaking so slowly that other people could have noticed.  Or the opposite - being so fidgety or restless that you have been moving around a lot more than usual?  2 - more than half the days   Thoughts that you would be better off dead, or of hurting yourself?  0 - not at all   Patient Health Questionnaire Score: 13       If depressive symptoms identified deferred to follow up visit unless specifically addressed in assesment and plan.    Interpretation of PHQ-9 Total Score   Score Severity   1-4 No Depression   5-9 Mild Depression   10-14 Moderate Depression   15-19 Moderately Severe Depression   20-27 Severe Depression    MERRILL-7 Questionnaire    Feeling nervous, anxious, or on edge: Nearly every day  Not being able to sop or control worrying: Several days  Worrying too much about different things: Several days  Trouble relaxing: Nearly every day  Being so restless that it's hard to sit still: More than half the days  Becoming easily annoyed or irritable: More than half  the days  Feeling afraid as if something awful might happen: Several days  Total: 13    Interpretation of MERRILL 7 Total Score   Score Severity :  0-4 No Anxiety   5-9 Mild Anxiety  10-14 Moderate Anxiety  15-21 Severe Anxiety

## 2021-07-08 NOTE — ASSESSMENT & PLAN NOTE
Acute congestion and nasal pressure/ mucus ongoing for some time. Recently seen by Dr. Kessler with allergy and started on budesonide sinus rinse which seems to be helping with the pressure and swelling. She is getting out thick mucus, sometimes blood-tinged, but it appears to be gradually clearing

## 2021-07-08 NOTE — ASSESSMENT & PLAN NOTE
Chronic issue with intermittent flares.  She does feel that the Cymbalta may be having a positive impact on her pain.  She has follow-up planned with physiatry

## 2021-07-08 NOTE — PROGRESS NOTES
Subjective:     Chief Complaint   Patient presents with   • Follow-Up     immunologist, doing ok on cymbalta     Bree Gage is a 40 y.o. female here today to follow up on:    Sinus congestion  Acute congestion and nasal pressure/ mucus ongoing for some time. Recently seen by Dr. Kessler with allergy and started on budesonide sinus rinse which seems to be helping with the pressure and swelling. She is getting out thick mucus, sometimes blood-tinged, but it appears to be gradually clearing    ROME on CPAP  Has not been able to use cpap d/t acute sinus pressure/ congestion     Neck pain on right side  Chronic issue with intermittent flares.  She does feel that the Cymbalta may be having a positive impact on her pain.  She has follow-up planned with physiatry    Moderate episode of recurrent major depressive disorder (HCC)  Here today to follow-up on medication change from venlafaxine to Cymbalta which she just started earlier this month.  So far tolerating medication well and thinks that it has had a positive impact on her energy level.  She is still having some depressive feelings but overall thinks things are moving in the right direction  No SI/HI, no substance abuse, no self-injurious behavior  Depression Screening    Little interest or pleasure in doing things?  1 - several days   Feeling down, depressed , or hopeless? 2 - more than half the days   Trouble falling or staying asleep, or sleeping too much?  1 - several days   Feeling tired or having little energy?  3 - nearly every day   Poor appetite or overeating?  0 - not at all   Feeling bad about yourself - or that you are a failure or have let yourself or your family down? 1 - several days   Trouble concentrating on things, such as reading the newspaper or watching television? 3 - nearly every day   Moving or speaking so slowly that other people could have noticed.  Or the opposite - being so fidgety or restless that you have been moving around a lot  more than usual?  2 - more than half the days   Thoughts that you would be better off dead, or of hurting yourself?  0 - not at all   Patient Health Questionnaire Score: 13       If depressive symptoms identified deferred to follow up visit unless specifically addressed in assesment and plan.    Interpretation of PHQ-9 Total Score   Score Severity   1-4 No Depression   5-9 Mild Depression   10-14 Moderate Depression   15-19 Moderately Severe Depression   20-27 Severe Depression    MERRILL-7 Questionnaire    Feeling nervous, anxious, or on edge: Nearly every day  Not being able to sop or control worrying: Several days  Worrying too much about different things: Several days  Trouble relaxing: Nearly every day  Being so restless that it's hard to sit still: More than half the days  Becoming easily annoyed or irritable: More than half the days  Feeling afraid as if something awful might happen: Several days  Total: 13    Interpretation of MERRILL 7 Total Score   Score Severity :  0-4 No Anxiety   5-9 Mild Anxiety  10-14 Moderate Anxiety  15-21 Severe Anxiety               Current medicines (including changes today)  Current Outpatient Medications   Medication Sig Dispense Refill   • budesonide (PULMICORT) 0.5 MG/2ML Suspension USE 1 VIAL VIA NEBULIZER BY MOUTH TWICE A DAY AS DIRECTED     • modafinil (PROVIGIL) 200 MG Tab      • mupirocin (BACTROBAN) 2 % Ointment      • DULoxetine (CYMBALTA) 30 MG Cap DR Particles Take 1 capsule by mouth every day. 30 capsule 1   • predniSONE (DELTASONE) 10 MG Tab TAKE 6 TABLETS BY MOUTH EVERY DAY FOR 2 DAYS AND THEN REDUCE BY 1 TAB EVERY OTHER DAY UNTIL GONE 45 tablet 0   • fluticasone (FLONASE) 50 MCG/ACT nasal spray Administer 1 Spray into affected nostril(S) every day.     • Rimegepant Sulfate (NURTEC) 75 MG TABLET DISPERSIBLE Take 1 tablet by mouth as needed. 1 tab at headache onset; repeat in 24 hours 10 tablet 5   • ARIPiprazole (ABILIFY) 5 MG tablet Take 1 tablet by mouth every day. 90  tablet 3   • meloxicam (MOBIC) 15 MG tablet Take 1 tablet by mouth 1 time a day as needed. 30 tablet 5   • methocarbamol (ROBAXIN) 500 MG Tab Take 500 mg by mouth 3 times a day as needed.     • ondansetron (ZOFRAN) 4 MG Tab tablet Take 1 Tab by mouth every four hours as needed for Nausea/Vomiting. 20 Tab 0   • albuterol 108 (90 Base) MCG/ACT Aero Soln inhalation aerosol Inhale 2 Puffs by mouth every four hours as needed for Shortness of Breath. 1 Each 5   • omeprazole (PRILOSEC) 20 MG delayed-release capsule Take 20 mg by mouth every day.     • acetaminophen (TYLENOL) 500 MG TABS Take 500-1,000 mg by mouth every 6 hours as needed. Up to 8 a day     • linaCLOtide (LINZESS) 72 MCG Cap Take 1 capsule by mouth every day. (Patient not taking: Reported on 7/8/2021) 30 capsule 2   • venlafaxine XR (EFFEXOR XR) 75 MG CAPSULE SR 24 HR TAKE 1 CAPSULE BY MOUTH EVERY DAY 90 capsule 3   • Magnesium Oxide 500 MG Tab Take 1 tablet by mouth.       No current facility-administered medications for this visit.     She  has a past medical history of Alcohol addiction ([facility]), Allergy, Angina (last 2 years), Apnea, sleep, Arthritis (current), Back pain, Bronchitis, Chickenpox, Constipation, Daytime sleepiness, Depression, Dizziness, Drug addiction ([facility]), EBV positive mononucleosis syndrome, Eye pain, Fatigue, Fever, Fracture of finger, Frequent headaches, Gasping for breath, GERD (gastroesophageal reflux disease), Hemorrhoids, Hyperlipidemia, Hypothyroidism, IBS (irritable bowel syndrome), Indigestion, Influenza, Iron deficiency anemia, LBP (low back pain), Morning headache, Painful joint, Rectal fissure, Snoring, Sore muscles, Varicose veins, and Wears glasses. She also has no past medical history of Arrhythmia, Cancer ([facility]), CATARACT, Congestive heart failure ([facility]), COPD, Diabetes, Dialysis, Glaucoma, Heart murmur, Heart valve disease, Hypertension, Jaundice, Myocardial infarct ([facility]), Other specified symptom associated with female  "genital organs, Pacemaker, Personal history of venous thrombosis and embolism, Pneumonia, Renal disorder, Rheumatic fever, Seizure (HCC), Stroke (HCC), Unspecified hemorrhagic conditions, or Unspecified urinary incontinence.    ROS included above     Objective:     /70 (BP Location: Right arm, Patient Position: Sitting, BP Cuff Size: Adult)   Pulse 83   Temp 36.7 °C (98 °F) (Temporal)   Resp 16   Ht 1.55 m (5' 1.02\")   Wt 73 kg (161 lb)   SpO2 97%  Body mass index is 30.4 kg/m².     Physical Exam:  General: Alert, oriented in no acute distress.  Eye contact is good, speech is normal, affect calm  HEENT: Right TM gray and reflective, left TM with mild erythema and clear effusion.  Intact, good landmarks.  Nares with clear mucus bilaterally.  No tonsillar or cervical lymphadenopathy.  Lungs: clear to auscultation bilaterally, normal effort, no wheeze/ rhonchi/ rales.  CV: regular rate and rhythm, S1, S2, no murmur  Ext: no edema, color normal, vascularity normal, temperature normal    Assessment and Plan:   The following treatment plan was discussed   1. Sinus congestion   some improvement since having started budesonide rinse, she will continue with this and schedule follow-up with ENT   2. ROME on CPAP   unable to use CPAP at this time   3. Neck pain on right side   chronic issue with intermittent flares, planned follow-up with physiatry.  Possible that Cymbalta may help with her neck pain   4. Moderate episode of recurrent major depressive disorder (HCC)   recently made switch from venlafaxine to Cymbalta and overall feels that this is going well.  We will give the current dose a few more weeks to have full effect, consider increase if needed.  She will contact me with an update in about 2 weeks       Followup: 2 weeks, sooner as needed         Please note that this dictation was created using voice recognition software. I have worked with consultants from the vendor as well as technical experts from " [facility] to optimize the interface. I have made every reasonable attempt to correct obvious errors, but I expect that there are errors of grammar and possibly content that I did not discover before finalizing the note.

## 2021-07-23 ENCOUNTER — OFFICE VISIT (OUTPATIENT)
Dept: DERMATOLOGY | Facility: IMAGING CENTER | Age: 41
End: 2021-07-23
Payer: COMMERCIAL

## 2021-07-23 DIAGNOSIS — D22.9 MULTIPLE NEVI: ICD-10-CM

## 2021-07-23 DIAGNOSIS — L81.4 LENTIGINES: ICD-10-CM

## 2021-07-23 DIAGNOSIS — Z12.83 SKIN CANCER SCREENING: ICD-10-CM

## 2021-07-23 DIAGNOSIS — D48.5 NEOPLASM OF UNCERTAIN BEHAVIOR OF SKIN: ICD-10-CM

## 2021-07-23 PROCEDURE — 99213 OFFICE O/P EST LOW 20 MIN: CPT | Mod: 25 | Performed by: NURSE PRACTITIONER

## 2021-07-23 PROCEDURE — 11103 TANGNTL BX SKIN EA SEP/ADDL: CPT | Performed by: NURSE PRACTITIONER

## 2021-07-23 PROCEDURE — 11102 TANGNTL BX SKIN SINGLE LES: CPT | Performed by: NURSE PRACTITIONER

## 2021-07-23 NOTE — PROGRESS NOTES
DERMATOLOGY NOTE  NEW VISIT       Chief complaint: Establish Care and Skin Lesion   last exam 3 years ago , former patient [facility]   HPI:  3 moles   Location: under left arm pit   Time present:  Birth   Painful lesion: No  Itching lesion: Yes  Enlarging lesion: No  Anything make it better or worse?no     HPI:  Mole   Location:  Belly button   Time present: 3 years   Painful lesion: No  Itching lesion: No  Enlarging lesion: Yes  Anything make it better or worse?no     HPI:  Moles   Location: on back   Time present:  Unknown   Painful lesion: No  Itching lesion: No  Enlarging lesion: No  Anything make it better or worse?no     HPI: mole   Location: right breast   Time present: birth   Painful lesion: No  Itching lesion: No  Enlarging lesion: No  Anything make it better or worse?no       History of skin cancer: No  History of precancers/actinic keratoses: No  History of biopsies:Yes, Details: chest 12 yeras ago benign , upper right arm benign ,  left axilla 3 years ago mole . Results benign   History of blistering/severe sunburns:Yes, Details: .  Family history of skin cancer:No  Family history of atypical moles:Yes, Details: .      Allergies   Allergen Reactions   • Ciprofloxacin    • Compazine Unspecified     agitation   • Diclofenac      Upset     • Epinephrine      palpitations   • Latex         MEDICATIONS:  Medications relevant to specialty reviewed.     REVIEW OF SYSTEMS:   Positive for skin (see HPI)  Negative for fevers and chills       EXAM:  There were no vitals taken for this visit.  Constitutional: Well-developed, well-nourished, and in no distress.     A total body skin exam was performed excluding the genitals per patient preference and including the following areas: head (including face), neck, chest, abdomen, groin/buttocks, back, bilateral upper extremities, and bilateral lower extremities with the following pertinent findings listed below and/or in assessment/plan.     Multiple tan light brown  skin-colored macules papules scattered over the trunk >> extremities  -sun exposed skin of trunk and b/l upper and lower extremities with scattered clinically benign light brown reticulated macules all of which were morphologically similar and none of which were suspicious for skin cancer today on exam    5 mm medium brown papule to left axilla  6mm medium brown papule to left abd     IMPRESSION / PLAN:    1. Neoplasm of uncertain behavior of skinx2  Procedure Note   Procedure: Biopsy by shave technique  Location: left axilla  Size: as noted in exam  Preoperative diagnosis:r/o atypia  Risks, benefits and alternatives of procedure discussed and written informed consent obtained for procedure and verbal consent for photos. Time out completed. Area of biopsy prepped with alcohol. Anesthesia with 1% lidocaine with epinephrine administered with 30 gauge needle. Shave biopsy of the site performed. Hemostasis achieved with pressure and aluminum chloride. Vaseline applied to wound with bandage. Patient tolerated procedure well and there were no complications. The specimen was sent to the pathology lab by the staff. Wound care was discussed.    Procedure Note   Procedure: Biopsy by shave technique  Location: left abd  Size: as noted in exam  Preoperative diagnosis: r/o atypia  Risks, benefits and alternatives of procedure discussed and written informed consent obtained for procedure and verbal consent for photos. Time out completed. Area of biopsy prepped with alcohol. Anesthesia with 1% lidocaine with epinephrine administered with 30 gauge needle. Shave biopsy of the site performed. Hemostasis achieved with pressure and aluminum chloride. Vaseline applied to wound with bandage. Patient tolerated procedure well and there were no complications. The specimen was sent to the pathology lab by the staff. Wound care was discussed.      2. Multiple nevi  - Benign-appearing nature of lesions discussed. Advised to return to clinic  for any new or concerning changes.  - ABCDE's of melanoma discussed      3. Lentigines  - Discussed benign nature of lesions, related to sun exposure  - Discussed sun protection, hand out provided      4. Skin cancer screening  Skin cancer education  - discussed importance of sun protective clothing, eyewear  - discussed importance of daily use of broad spectrum sunscreen with SPF 30 or greater, as well as need for reapplication ~every 2 hours when exposed to UVR  - discussed importance of regular self-exams, ideally once per month, every 12 months exams in clinic  - HUMBERTO's of melanoma discussed  - patient to bring any new or concerning lesions to my attention  - Patient educational handout provided and reviewed with patient         Please note that this dictation was created using voice recognition software. I have made every reasonable attempt to correct obvious errors, but I expect that there are errors of grammar and possibly content that I did not discover before finalizing the note.      Return to clinic in: Return in about 1 year (around 7/23/2022) for EDGAR. and as needed for any new or changing skin lesions.

## 2021-07-27 ENCOUNTER — PATIENT MESSAGE (OUTPATIENT)
Dept: MEDICAL GROUP | Facility: MEDICAL CENTER | Age: 41
End: 2021-07-27

## 2021-07-27 DIAGNOSIS — J32.4 CHRONIC PANSINUSITIS: ICD-10-CM

## 2021-07-27 RX ORDER — DULOXETIN HYDROCHLORIDE 60 MG/1
60 CAPSULE, DELAYED RELEASE ORAL DAILY
Qty: 90 CAPSULE | Refills: 1 | Status: SHIPPED | OUTPATIENT
Start: 2021-07-27
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
## 2021-07-29 ENCOUNTER — SLEEP CENTER VISIT (OUTPATIENT)
Dept: SLEEP MEDICINE | Facility: MEDICAL CENTER | Age: 41
End: 2021-07-29
Payer: COMMERCIAL

## 2021-07-29 VITALS
DIASTOLIC BLOOD PRESSURE: 78 MMHG | HEIGHT: 61 IN | RESPIRATION RATE: 16 BRPM | WEIGHT: 164 LBS | OXYGEN SATURATION: 98 % | HEART RATE: 78 BPM | BODY MASS INDEX: 30.96 KG/M2 | SYSTOLIC BLOOD PRESSURE: 134 MMHG

## 2021-07-29 DIAGNOSIS — Z87.891 FORMER SMOKER: ICD-10-CM

## 2021-07-29 DIAGNOSIS — E66.9 OBESITY (BMI 30-39.9): ICD-10-CM

## 2021-07-29 DIAGNOSIS — R09.81 SINUS CONGESTION: ICD-10-CM

## 2021-07-29 DIAGNOSIS — G47.33 OSA ON CPAP: Chronic | ICD-10-CM

## 2021-07-29 DIAGNOSIS — R40.0 DAYTIME SOMNOLENCE: Chronic | ICD-10-CM

## 2021-07-29 DIAGNOSIS — J45.20 MILD INTERMITTENT ASTHMA IN ADULT WITHOUT COMPLICATION: Chronic | ICD-10-CM

## 2021-07-29 PROCEDURE — 99214 OFFICE O/P EST MOD 30 MIN: CPT | Performed by: NURSE PRACTITIONER

## 2021-07-29 ASSESSMENT — FIBROSIS 4 INDEX: FIB4 SCORE: 0.91

## 2021-07-29 NOTE — PROGRESS NOTES
Chief Complaint   Patient presents with   • Apnea     last seen 4/22/2021        HPI:  Bree Gage is a 40 y.o. year old female here today for follow-up on ROME. Hx of athma followed by PCP/Allergy.  Last OV 4/22/21    PMH: fibromyalgia, migraines and chronic fatigue. Prior hospitalization 2009 for pneumonia with respiratory failure requiring tracheostomy and ARF.    Since last office visit she was referred to asthma/allergy who referred her to ENT due to concern over her airway and chronic sinus issues.  She is pending follow-up with ENT.  PCP went ahead and ordered a CT of her sinuses which she is going to complete in the near future.  She is now doing a budesonide rinse every day with significant drainage from her nose that continues and postnasal drip creating cough.  She notes some worsening of asthma symptoms and off a maintenance inhaler due to insurance issues and awaiting prior Auth was initiated last November by her allergist.  She is currently using albuterol at least 2 times a week.  She has been unable to use her CPAP due to the consistent sinus drainage and pressure.  Her last amount of use did note 15 days of use with a reduced AHI of 2.3/h.  She would like to resume therapy if her sinuses could tolerate it.  She will continue to try intermittently.  She does have her modafinil and uses half tablets in the morning which suffice her most of the day and then she may fill fatigue again at 3 PM.  She generally will take another half tablet to make it through the rest of her shift.  She only uses this on days she works.  When she is at home she will refrain.  She overall feels this is what is helping her make it through each day due to the fatigue which is most likely multifactorial due to being off of her CPAP with ongoing sinus issues possible infection.  Her asthma may also be slightly under control.     Sleep history:  PSG 10/19/17 noted AHI 7.2/hr and O2 mira 91%. MSLT noted sleep onset  latency of 9min and 26 second with no sleep onset REM periods. She was treated with Provigil with improvement in symptoms.  PSG 11/3/19 noted AHI 7.6/hr and supine AHi 23.5/hr with O2 mira 89%. She was started on APAP 5-12cm with improvement in symptoms. Current device obtained 11/2019.  CNOX on Pap 1/28/2021 noted mean SPO2 of 89% and less than 88% for 9 minutes of the night.  O2 mira appears to be artifact.  Continue CPAP at current pressure.           ROS: As per HPI and otherwise negative if not stated.    Past Medical History:   Diagnosis Date   • Alcohol addiction ([facility])    • Allergy    • Angina last 2 years    pt attributes to asthma   • Apnea, sleep    • Arthritis current    osteoarthritis   • Back pain    • Bronchitis     4-5x/year   • Chickenpox    • Constipation    • Daytime sleepiness    • Depression    • Dizziness    • Drug addiction (HCC)    • EBV positive mononucleosis syndrome    • Eye pain    • Fatigue    • Fever    • Fracture of finger     rt index x 3   • Frequent headaches    • Gasping for breath    • GERD (gastroesophageal reflux disease)    • Hemorrhoids    • Hyperlipidemia    • Hypothyroidism    • IBS (irritable bowel syndrome)    • Indigestion    • Influenza    • Iron deficiency anemia    • LBP (low back pain)     si joint epidural 2008   • Morning headache    • Painful joint    • Rectal fissure    • Snoring    • Sore muscles    • Varicose veins    • Wears glasses        Past Surgical History:   Procedure Laterality Date   • TRACHEOSTOMY  10/29/2009    Performed by NIDHI LARA at SURGERY [facility]   • GASTROSTOMY LAPAROSCOPIC  10/29/2009    Performed by NIDHI LARA at [facility]       Family History   Problem Relation Age of Onset   • Hypertension Mother    • Hyperlipidemia Mother    • Alcohol/Drug Mother    • Psychiatric Illness Mother    • Hypertension Father    • Hyperlipidemia Father    • Alcohol/Drug Father    • Psychiatric Illness Father    • Cancer  Maternal Grandfather    • Heart Disease Maternal Grandfather    • Heart Disease Paternal Grandfather    • Alcohol/Drug Paternal Grandfather        Social History     Socioeconomic History   • Marital status:      Spouse name: Not on file   • Number of children: Not on file   • Years of education: Not on file   • Highest education level: Associate degree: occupational, technical, or vocational program   Occupational History   • Not on file   Tobacco Use   • Smoking status: Former Smoker     Packs/day: 0.50     Years: 19.00     Pack years: 9.50     Types: Cigarettes     Start date: 1998     Quit date: 8/15/2015     Years since quittin.9   • Smokeless tobacco: Never Used   • Tobacco comment: Quit 2015   Vaping Use   • Vaping Use: Every day   Substance and Sexual Activity   • Alcohol use: Yes     Alcohol/week: 0.0 oz     Comment: one or two a week   • Drug use: Not Currently     Types: Methamphetamines     Comment: clean since   Rehab ,  speed   • Sexual activity: Yes     Partners: Male     Birth control/protection: Pill   Other Topics Concern   •  Service No   • Blood Transfusions Yes   • Caffeine Concern No   • Occupational Exposure Yes   • Hobby Hazards No   • Sleep Concern No   • Stress Concern No   • Weight Concern Yes   • Special Diet No   • Back Care No   • Exercise No   • Bike Helmet No   • Seat Belt Yes   • Self-Exams Yes   Social History Narrative   • Not on file     Social Determinants of Health     Financial Resource Strain: Medium Risk   • Difficulty of Paying Living Expenses: Somewhat hard   Food Insecurity: Food Insecurity Present   • Worried About Running Out of Food in the Last Year: Sometimes true   • Ran Out of Food in the Last Year: Sometimes true   Transportation Needs: No Transportation Needs   • Lack of Transportation (Medical): No   • Lack of Transportation (Non-Medical): No   Physical Activity: Insufficiently Active   • Days of Exercise per Week: 2 days  "  • Minutes of Exercise per Session: 20 min   Stress: Stress Concern Present   • Feeling of Stress : Very much   Social Connections: Unknown   • Frequency of Communication with Friends and Family: Once a week   • Frequency of Social Gatherings with Friends and Family: Never   • Attends [employer] Services: Patient refused   • Active Member of Clubs or Organizations: Yes   • Attends Club or Organization Meetings: Never   • Marital Status: Living with partner   Intimate Partner Violence:    • Fear of Current or Ex-Partner:    • Emotionally Abused:    • Physically Abused:    • Sexually Abused:        Allergies as of 07/29/2021 - Reviewed 07/29/2021   Allergen Reaction Noted   • Ciprofloxacin  11/08/2010   • Compazine Unspecified 01/09/2018   • Diclofenac  09/03/2009   • Epinephrine  09/03/2009   • Latex  09/03/2009        Vitals:  /78 (BP Location: Left arm, Patient Position: Sitting, BP Cuff Size: Adult)   Pulse 78   Resp 16   Ht 1.549 m (5' 1\")   Wt 74.4 kg (164 lb)   SpO2 98%     Current medications as of today   Current Outpatient Medications   Medication Sig Dispense Refill   • Fluticasone Furoate-Vilanterol (BREO ELLIPTA) 100-25 MCG/INH AEROSOL POWDER, BREATH ACTIVATED Inhale 1 Puff every day. Rinse mouth after use. 2 Each 0   • DULoxetine (CYMBALTA) 60 MG Cap DR Particles delayed-release capsule Take 1 capsule by mouth every day. 90 capsule 1   • budesonide (PULMICORT) 0.5 MG/2ML Suspension USE 1 VIAL VIA NEBULIZER BY MOUTH TWICE A DAY AS DIRECTED     • modafinil (PROVIGIL) 200 MG Tab      • mupirocin (BACTROBAN) 2 % Ointment      • DULoxetine (CYMBALTA) 30 MG Cap DR Particles Take 1 capsule by mouth every day. 30 capsule 1   • predniSONE (DELTASONE) 10 MG Tab TAKE 6 TABLETS BY MOUTH EVERY DAY FOR 2 DAYS AND THEN REDUCE BY 1 TAB EVERY OTHER DAY UNTIL GONE 45 tablet 0   • linaCLOtide (LINZESS) 72 MCG Cap Take 1 capsule by mouth every day. 30 capsule 2   • fluticasone (FLONASE) 50 MCG/ACT nasal spray " Administer 1 Spray into affected nostril(S) every day.     • Rimegepant Sulfate (NURTEC) 75 MG TABLET DISPERSIBLE Take 1 tablet by mouth as needed. 1 tab at headache onset; repeat in 24 hours 10 tablet 5   • ARIPiprazole (ABILIFY) 5 MG tablet Take 1 tablet by mouth every day. 90 tablet 3   • meloxicam (MOBIC) 15 MG tablet Take 1 tablet by mouth 1 time a day as needed. 30 tablet 5   • methocarbamol (ROBAXIN) 500 MG Tab Take 500 mg by mouth 3 times a day as needed.     • ondansetron (ZOFRAN) 4 MG Tab tablet Take 1 Tab by mouth every four hours as needed for Nausea/Vomiting. 20 Tab 0   • albuterol 108 (90 Base) MCG/ACT Aero Soln inhalation aerosol Inhale 2 Puffs by mouth every four hours as needed for Shortness of Breath. 1 Each 5   • omeprazole (PRILOSEC) 20 MG delayed-release capsule Take 20 mg by mouth every day.     • acetaminophen (TYLENOL) 500 MG TABS Take 500-1,000 mg by mouth every 6 hours as needed. Up to 8 a day       No current facility-administered medications for this visit.         Physical Exam:   Gen:           Alert and oriented, No apparent distress. Mood and affect appropriate, normal interaction with examiner.  Eyes:          PERRL, EOM intact, sclere white, conjunctive moist.  Ears:          Not examined.   Hearing:     Grossly intact.  Nose:          Normal, no lesions or deformities.  Dentition:    mask  Oropharynx:   mask  Mallampati Classification: mask  Neck:        Supple, trachea midline, no masses.  Respiratory Effort: No intercostal retractions or use of accessory muscles.   Lung Auscultation:      Clear to auscultation bilaterally; no rales, rhonchi or wheezing.  CV:            Regular rate and rhythm. No murmurs, rubs or gallops.  Abd:           Not examined.   Lymphadenopathy: Not examined.  Gait and Station: Normal.  Digits and Nails: No clubbing, cyanosis, petechiae, or nodes.   Cranial Nerves: II-XII grossly intact.  Skin:        No rashes, lesions or ulcers noted.               Ext:            No cyanosis or edema.      Assessment:  1. ROME on CPAP     2. Daytime somnolence     3. Mild intermittent asthma in adult without complication     4. Sinus congestion     5. Obesity (BMI 30-39.9)  Height And Weight   6. Former smoker         Immunizations:    Flu:recommend in fall  Pneumovax 23:2010  Prevnar 13:due age 66  COVID-19: 5/27/21, 3/18/21    Plan:  1. ROME is not well controlled at this time due to intolerance from persistent sinus issues. She will attempt resuming therapy if sinuses calm down.  She is going to request a nasal swab for cultures from her primary care until she is able to get into ENT.  She will be completing CT of sinuses soon.  2.  Sample of Breo 100 mcg 1 puff daily to start a trial of to see if this further improves her asthma at this time.  Continue albuterol HFA inhaler as needed.  This is beneficial we can attempt pursuing prescription and possible prior Auth may be needed.  3.  Encourage weight loss to diet exercise  4.  For daytime somnolence may continue modafinil 200 mg daily.  Advise continued drug holidays.  5.  For primary care for the health concerns 9 6.  Follow-up in 2 months for compliance check, sooner if needed.    Please note that this dictation was created using voice recognition software. I have made every reasonable attempt to correct obvious errors, but it is possible there are errors of grammar and possibly content that I did not discover before finalizing the note.

## 2021-08-09 ENCOUNTER — OFFICE VISIT (OUTPATIENT)
Dept: URGENT CARE | Facility: CLINIC | Age: 41
End: 2021-08-09
Payer: COMMERCIAL

## 2021-08-09 VITALS
RESPIRATION RATE: 14 BRPM | SYSTOLIC BLOOD PRESSURE: 102 MMHG | BODY MASS INDEX: 31.15 KG/M2 | DIASTOLIC BLOOD PRESSURE: 70 MMHG | TEMPERATURE: 98.7 F | HEART RATE: 96 BPM | OXYGEN SATURATION: 98 % | HEIGHT: 61 IN | WEIGHT: 165 LBS

## 2021-08-09 DIAGNOSIS — J01.40 ACUTE NON-RECURRENT PANSINUSITIS: ICD-10-CM

## 2021-08-09 PROCEDURE — 99214 OFFICE O/P EST MOD 30 MIN: CPT | Performed by: NURSE PRACTITIONER

## 2021-08-09 RX ORDER — AMOXICILLIN AND CLAVULANATE POTASSIUM 875; 125 MG/1; MG/1
1 TABLET, FILM COATED ORAL 2 TIMES DAILY
Qty: 14 TABLET | Refills: 0 | Status: SHIPPED | OUTPATIENT
Start: 2021-08-09 | End: 2021-08-16

## 2021-08-09 RX ORDER — DILTIAZEM HYDROCHLORIDE 60 MG/1
TABLET, FILM COATED ORAL
COMMUNITY
Start: 2021-07-17 | End: 2021-09-16

## 2021-08-09 ASSESSMENT — FIBROSIS 4 INDEX: FIB4 SCORE: 0.91

## 2021-08-09 NOTE — LETTER
August 9, 2021         Patient: Bree Gage   YOB: 1980   Date of Visit: 8/9/2021           To Whom it May Concern:    Bree Gage was seen in my clinic on 8/9/2021. She may be excused from work today.     If you have any questions or concerns, please don't hesitate to call.        Sincerely,           XUAN Mirza  Electronically Signed

## 2021-08-09 NOTE — PROGRESS NOTES
Chief Complaint   Patient presents with   • Migraine     nausea, vomiting, sore throat, suspects sinus inf., left ear hurts, x6days, seeing a ENT, getting CT on Sat       HISTORY OF PRESENT ILLNESS: Patient is a pleasant 40 y.o. female who presents today due to six days of nasal congestion, chills, headache, decreased appetite, nausea, and sinus pressure (primarily left-sided).  She denies associated fever, difficulty breathing, confusion, diarrhea.  She has tried OTC cold/sinus medication at home without much improvement.  Admits a history of sinus infections in the past, scheduled for CT of her sinuses this week due to recurrent sinus issues over the past 6 months, is scheduled to see ENT next month. No known ill contacts at home. No recent antibiotic usage. Denies recent known exposure to COVID-19.       Patient Active Problem List    Diagnosis Date Noted   • Sinus congestion 07/08/2021   • Neck pain on right side 02/12/2021   • Daytime somnolence 01/19/2021   • ROME on CPAP 10/23/2020   • Low vitamin D level 10/23/2020   • Obesity (BMI 30-39.9) 08/30/2019   • History of fracture of right ankle 06/13/2019   • Numbness of right hand 04/17/2019   • Adjustment insomnia 10/05/2018   • Elevated glucose 06/22/2018   • Obesity, Class I, BMI 30.0-34.9 (see actual BMI) 05/23/2018   • Moderate episode of recurrent major depressive disorder (HCC) 05/23/2018   • Asthma in adult without complication 05/23/2018   • Basilar artery migraine, intractable 10/11/2017   • Chronic migraine 10/11/2017   • EEG abnormal 10/11/2017   • Hyperprolactinemia (HCC) 02/13/2013   • Fatigue 09/03/2009   • Preventative health care 09/03/2009   • IBS (irritable bowel syndrome)    • History of alcohol abuse    • History of methamphetamine abuse (HCC)    • History of hypothyroidism    • Hemorrhoids    • Rectal fissure    • Varicose veins    • Iron deficiency anemia    • Hyperlipidemia    • Low back pain    • EBV positive mononucleosis syndrome         Allergies:Ciprofloxacin, Compazine, Diclofenac, Epinephrine, and Latex    Current Outpatient Medications Ordered in Epic   Medication Sig Dispense Refill   • amoxicillin-clavulanate (AUGMENTIN) 875-125 MG Tab Take 1 tablet by mouth 2 times a day for 7 days. 14 tablet 0   • Fluticasone Furoate-Vilanterol (BREO ELLIPTA) 100-25 MCG/INH AEROSOL POWDER, BREATH ACTIVATED Inhale 1 Puff every day. Rinse mouth after use. 2 Each 0   • budesonide (PULMICORT) 0.5 MG/2ML Suspension USE 1 VIAL VIA NEBULIZER BY MOUTH TWICE A DAY AS DIRECTED     • modafinil (PROVIGIL) 200 MG Tab      • mupirocin (BACTROBAN) 2 % Ointment      • DULoxetine (CYMBALTA) 30 MG Cap DR Particles Take 1 capsule by mouth every day. 30 capsule 1   • predniSONE (DELTASONE) 10 MG Tab TAKE 6 TABLETS BY MOUTH EVERY DAY FOR 2 DAYS AND THEN REDUCE BY 1 TAB EVERY OTHER DAY UNTIL GONE 45 tablet 0   • linaCLOtide (LINZESS) 72 MCG Cap Take 1 capsule by mouth every day. 30 capsule 2   • fluticasone (FLONASE) 50 MCG/ACT nasal spray Administer 1 Spray into affected nostril(S) every day.     • Rimegepant Sulfate (NURTEC) 75 MG TABLET DISPERSIBLE Take 1 tablet by mouth as needed. 1 tab at headache onset; repeat in 24 hours 10 tablet 5   • ARIPiprazole (ABILIFY) 5 MG tablet Take 1 tablet by mouth every day. 90 tablet 3   • meloxicam (MOBIC) 15 MG tablet Take 1 tablet by mouth 1 time a day as needed. 30 tablet 5   • methocarbamol (ROBAXIN) 500 MG Tab Take 500 mg by mouth 3 times a day as needed.     • ondansetron (ZOFRAN) 4 MG Tab tablet Take 1 Tab by mouth every four hours as needed for Nausea/Vomiting. 20 Tab 0   • albuterol 108 (90 Base) MCG/ACT Aero Soln inhalation aerosol Inhale 2 Puffs by mouth every four hours as needed for Shortness of Breath. 1 Each 5   • omeprazole (PRILOSEC) 20 MG delayed-release capsule Take 20 mg by mouth every day.     • acetaminophen (TYLENOL) 500 MG TABS Take 500-1,000 mg by mouth every 6 hours as needed. Up to 8 a day     • SYMBICORT  80-4.5 MCG/ACT Aerosol      • DULoxetine (CYMBALTA) 60 MG Cap DR Particles delayed-release capsule Take 1 capsule by mouth every day. 90 capsule 1     No current Epic-ordered facility-administered medications on file.       Past Medical History:   Diagnosis Date   • Alcohol addiction ([facility])    • Allergy    • Angina last 2 years    pt attributes to asthma   • Apnea, sleep    • Arthritis current    osteoarthritis   • Back pain    • Bronchitis     4-5x/year   • Chickenpox    • Constipation    • Daytime sleepiness    • Depression    • Dizziness    • Drug addiction ([facility])    • EBV positive mononucleosis syndrome    • Eye pain    • Fatigue    • Fever    • Fracture of finger     rt index x 3   • Frequent headaches    • Gasping for breath    • GERD (gastroesophageal reflux disease)    • Hemorrhoids    • Hyperlipidemia    • Hypothyroidism    • IBS (irritable bowel syndrome)    • Indigestion    • Influenza    • Iron deficiency anemia    • LBP (low back pain)     si joint epidural    • Morning headache    • Painful joint    • Rectal fissure    • Snoring    • Sore muscles    • Varicose veins    • Wears glasses        Social History     Tobacco Use   • Smoking status: Former Smoker     Packs/day: 0.50     Years: 19.00     Pack years: 9.50     Types: Cigarettes     Start date: 1998     Quit date: 8/15/2015     Years since quittin.9   • Smokeless tobacco: Never Used   • Tobacco comment: Quit 2015   Vaping Use   • Vaping Use: Every day   Substance Use Topics   • Alcohol use: Yes     Alcohol/week: 0.0 oz     Comment: one or two a week   • Drug use: Not Currently     Types: Methamphetamines     Comment: clean since   Rehab ,  speed       Family Status   Relation Name Status   • Mo suicide attempt Alive   • Fa Drake Alive   • MGMo  Alive   • MGFa colon Alive   • PGMo osteoporosis    • PGFa       Family History   Problem Relation Age of Onset   • Hypertension Mother    • Hyperlipidemia  "Mother    • Alcohol/Drug Mother    • Psychiatric Illness Mother    • Hypertension Father    • Hyperlipidemia Father    • Alcohol/Drug Father    • Psychiatric Illness Father    • Cancer Maternal Grandfather    • Heart Disease Maternal Grandfather    • Heart Disease Paternal Grandfather    • Alcohol/Drug Paternal Grandfather        ROS:  Review of Systems   Constitutional: Positive for chills, fatigue. Negative for fever, weight loss.   HENT: Positive for sinus pressure, sore throat, left ear pain, nasal congestion. Negative for nosebleeds, neck pain.    Eyes: Negative for vision changes.   Neuro: Positive for headache. Negative for sensory changes, weakness, seizure, LOC.  Cardiovascular: Negative for chest pain, palpitations, orthopnea and leg swelling.   Respiratory: Negative for cough, sputum production, shortness of breath and wheezing.   Gastrointestinal: Negative for abdominal pain, nausea, vomiting or diarrhea.    Skin: Negative for rash, diaphoresis.     Exam:  /70 (BP Location: Left arm, Patient Position: Sitting, BP Cuff Size: Adult)   Pulse 96   Temp 37.1 °C (98.7 °F) (Temporal)   Resp 14   Ht 1.549 m (5' 1\")   Wt 74.8 kg (165 lb)   SpO2 98%   General: well-nourished, well-developed female in NAD  Head: normocephalic, atraumatic  Eyes: PERRLA, no conjunctival injection, acuity grossly intact, lids normal.  Ears: normal shape and symmetry, no tenderness, no discharge. External canals are without any significant edema or erythema. Tympanic membranes are without any inflammation, no effusion. Gross auditory acuity is intact.  Nose: symmetrical without tenderness, erythema and swelling noted bilateral turbinates, clear discharge.  Bilateral maxillary, left frontal sinus tenderness.   Mouth/Throat: reasonable hygiene, no exudates or tonsillar enlargement. Erythema is present.   Neck: no masses, range of motion within normal limits, no tracheal deviation. No obvious thyroid enlargement.   Lymph: no " cervical adenopathy. No supraclavicular adenopathy.   Neuro: alert and oriented. Cranial nerves 1-12 grossly intact. No sensory deficit.   Cardiovascular: regular rate and rhythm. No edema.  Pulmonary: no distress. Chest is symmetrical with respiration, no wheezes, crackles, or rhonchi.   Musculoskeletal: no clubbing, appropriate muscle tone, gait is stable.  Skin: warm, dry, intact, no clubbing, no cyanosis, no rashes.   Psych: appropriate mood, affect, judgement.         Assessment/Plan:  1. Acute non-recurrent pansinusitis  amoxicillin-clavulanate (AUGMENTIN) 875-125 MG Tab         Antibiotic as directed, potential side effects of medication discussed. Probiotic use encouraged. Flonase as directed.  Perform CT on Wednesday as planned, follow-up with ENT as scheduled. Sleep with HOB elevated, humidifier at night, rest, increase fluid intake.   Supportive care, differential diagnoses, and indications for immediate follow-up discussed with patient.   Pathogenesis of diagnosis discussed including typical length and natural progression.   Instructed to return to clinic or nearest emergency department for any change in condition, further concerns, or worsening of symptoms.  Patient states understanding of the plan of care and discharge instructions.  Instructed to make an appointment, for follow up, with her primary care provider.        Please note that this dictation was created using voice recognition software. I have made every reasonable attempt to correct obvious errors, but I expect that there are errors of grammar and possibly content that I did not discover before finalizing the note.       MP Mirza.

## 2021-08-10 ENCOUNTER — PATIENT MESSAGE (OUTPATIENT)
Dept: MEDICAL GROUP | Facility: MEDICAL CENTER | Age: 41
End: 2021-08-10

## 2021-08-11 ENCOUNTER — HOSPITAL ENCOUNTER (OUTPATIENT)
Dept: RADIOLOGY | Facility: MEDICAL CENTER | Age: 41
End: 2021-08-11
Attending: NURSE PRACTITIONER
Payer: COMMERCIAL

## 2021-08-11 ENCOUNTER — NON-PROVIDER VISIT (OUTPATIENT)
Dept: NEUROLOGY | Facility: MEDICAL CENTER | Age: 41
End: 2021-08-11
Attending: PSYCHIATRY & NEUROLOGY
Payer: COMMERCIAL

## 2021-08-11 DIAGNOSIS — J32.4 CHRONIC PANSINUSITIS: ICD-10-CM

## 2021-08-11 PROCEDURE — 700111 HCHG RX REV CODE 636 W/ 250 OVERRIDE (IP): Performed by: NURSE PRACTITIONER

## 2021-08-11 PROCEDURE — 70486 CT MAXILLOFACIAL W/O DYE: CPT

## 2021-08-11 PROCEDURE — 96372 THER/PROPH/DIAG INJ SC/IM: CPT | Performed by: NURSE PRACTITIONER

## 2021-08-11 RX ORDER — KETOROLAC TROMETHAMINE 30 MG/ML
30 INJECTION, SOLUTION INTRAMUSCULAR; INTRAVENOUS ONCE
Status: COMPLETED | OUTPATIENT
Start: 2021-08-11 | End: 2021-08-11

## 2021-08-11 RX ADMIN — KETOROLAC TROMETHAMINE 30 MG: 30 INJECTION, SOLUTION INTRAMUSCULAR; INTRAVENOUS at 16:08

## 2021-08-13 ENCOUNTER — PATIENT MESSAGE (OUTPATIENT)
Dept: MEDICAL GROUP | Facility: MEDICAL CENTER | Age: 41
End: 2021-08-13

## 2021-08-13 DIAGNOSIS — B34.9 VIRAL ILLNESS: ICD-10-CM

## 2021-08-16 ENCOUNTER — APPOINTMENT (OUTPATIENT)
Dept: MEDICAL GROUP | Facility: MEDICAL CENTER | Age: 41
End: 2021-08-16
Payer: COMMERCIAL

## 2021-08-16 ENCOUNTER — HOSPITAL ENCOUNTER (OUTPATIENT)
Facility: MEDICAL CENTER | Age: 41
End: 2021-08-16
Attending: NURSE PRACTITIONER
Payer: COMMERCIAL

## 2021-08-16 ENCOUNTER — PATIENT MESSAGE (OUTPATIENT)
Dept: MEDICAL GROUP | Facility: MEDICAL CENTER | Age: 41
End: 2021-08-16

## 2021-08-16 DIAGNOSIS — B34.9 VIRAL ILLNESS: ICD-10-CM

## 2021-08-16 LAB
AMBIGUOUS DTTM AMBI4: NORMAL
COVID ORDER STATUS COVID19: NORMAL

## 2021-08-16 PROCEDURE — U0003 INFECTIOUS AGENT DETECTION BY NUCLEIC ACID (DNA OR RNA); SEVERE ACUTE RESPIRATORY SYNDROME CORONAVIRUS 2 (SARS-COV-2) (CORONAVIRUS DISEASE [COVID-19]), AMPLIFIED PROBE TECHNIQUE, MAKING USE OF HIGH THROUGHPUT TECHNOLOGIES AS DESCRIBED BY CMS-2020-01-R: HCPCS

## 2021-08-16 PROCEDURE — U0005 INFEC AGEN DETEC AMPLI PROBE: HCPCS

## 2021-08-17 LAB
SARS-COV-2 RNA RESP QL NAA+PROBE: NOTDETECTED
SPECIMEN SOURCE: NORMAL

## 2021-09-02 ENCOUNTER — PATIENT MESSAGE (OUTPATIENT)
Dept: MEDICAL GROUP | Facility: MEDICAL CENTER | Age: 41
End: 2021-09-02

## 2021-09-08 ENCOUNTER — PATIENT MESSAGE (OUTPATIENT)
Dept: SLEEP MEDICINE | Facility: MEDICAL CENTER | Age: 41
End: 2021-09-08

## 2021-09-08 ENCOUNTER — PATIENT MESSAGE (OUTPATIENT)
Dept: MEDICAL GROUP | Facility: MEDICAL CENTER | Age: 41
End: 2021-09-08

## 2021-09-08 DIAGNOSIS — R40.0 DAYTIME SOMNOLENCE: ICD-10-CM

## 2021-09-08 DIAGNOSIS — G47.33 OSA ON CPAP: ICD-10-CM

## 2021-09-08 RX ORDER — MODAFINIL 200 MG/1
200 TABLET ORAL DAILY
Qty: 45 TABLET | Refills: 0 | Status: SHIPPED | OUTPATIENT
Start: 2021-09-08 | End: 2021-09-23

## 2021-09-08 NOTE — TELEPHONE ENCOUNTER
From: Bree Gage  To: Nurse Practitioner Garima Berry  Sent: 9/8/2021 11:29 AM PDT  Subject: Good Morning    I put in a request for my Modafinil yesterday.  I'd like to ask for consideration upping to possibly 45 per month. 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 I feel 300 mg per day could be beneficial as I cut the 200 mg in half, I used to get up to 60 per month of the 200 mg. I saw ENT, he states he cannot help me. He wants me to be seen on a Hospital panel and focus on this unknown auto immune disorder. He does not thing the cysts are causing my Headaches and Sinus Surgery is not recommended. My plan is to attempt the CPAP again this weekend and try to get to wearing it every night again. I do have my FV in December but I know we just met last month and I honestly forgot to mention this consideration.

## 2021-09-16 ENCOUNTER — TELEMEDICINE (OUTPATIENT)
Dept: MEDICAL GROUP | Facility: MEDICAL CENTER | Age: 41
End: 2021-09-16
Payer: COMMERCIAL

## 2021-09-16 VITALS — HEIGHT: 61 IN | HEART RATE: 114 BPM | WEIGHT: 161 LBS | BODY MASS INDEX: 30.4 KG/M2 | TEMPERATURE: 99.4 F

## 2021-09-16 DIAGNOSIS — Z13.29 THYROID DISORDER SCREEN: ICD-10-CM

## 2021-09-16 DIAGNOSIS — R79.89 LOW VITAMIN D LEVEL: ICD-10-CM

## 2021-09-16 DIAGNOSIS — F33.1 MODERATE EPISODE OF RECURRENT MAJOR DEPRESSIVE DISORDER (HCC): ICD-10-CM

## 2021-09-16 DIAGNOSIS — R77.8 LOW SERUM COMPLEMENT C4: ICD-10-CM

## 2021-09-16 PROCEDURE — 99213 OFFICE O/P EST LOW 20 MIN: CPT | Mod: 95 | Performed by: NURSE PRACTITIONER

## 2021-09-16 ASSESSMENT — FIBROSIS 4 INDEX: FIB4 SCORE: 0.91

## 2021-09-17 ENCOUNTER — OFFICE VISIT (OUTPATIENT)
Dept: NEUROLOGY | Facility: MEDICAL CENTER | Age: 41
End: 2021-09-17
Attending: PSYCHIATRY & NEUROLOGY
Payer: COMMERCIAL

## 2021-09-17 VITALS
OXYGEN SATURATION: 98 % | TEMPERATURE: 97 F | WEIGHT: 161 LBS | HEART RATE: 108 BPM | BODY MASS INDEX: 29.63 KG/M2 | HEIGHT: 62 IN | SYSTOLIC BLOOD PRESSURE: 120 MMHG | DIASTOLIC BLOOD PRESSURE: 72 MMHG

## 2021-09-17 DIAGNOSIS — G43.119 BASILAR ARTERY MIGRAINE, INTRACTABLE: ICD-10-CM

## 2021-09-17 PROCEDURE — 99211 OFF/OP EST MAY X REQ PHY/QHP: CPT | Performed by: PSYCHIATRY & NEUROLOGY

## 2021-09-17 PROCEDURE — 99214 OFFICE O/P EST MOD 30 MIN: CPT | Performed by: PSYCHIATRY & NEUROLOGY

## 2021-09-17 RX ORDER — RIMEGEPANT SULFATE 75 MG/75MG
1 TABLET, ORALLY DISINTEGRATING ORAL
Qty: 15 TABLET | Refills: 5 | Status: SHIPPED | OUTPATIENT
Start: 2021-09-17

## 2021-09-17 RX ORDER — AMLODIPINE BESYLATE 2.5 MG/1
TABLET ORAL
Qty: 120 TABLET | Refills: 1 | Status: SHIPPED | OUTPATIENT
Start: 2021-09-17 | End: 2021-09-23

## 2021-09-17 ASSESSMENT — ENCOUNTER SYMPTOMS
FOCAL WEAKNESS: 0
PHOTOPHOBIA: 1
HEADACHES: 1
INSOMNIA: 0
MEMORY LOSS: 0
DEPRESSION: 0
DIZZINESS: 1

## 2021-09-17 ASSESSMENT — FIBROSIS 4 INDEX: FIB4 SCORE: 0.91

## 2021-09-17 NOTE — PROGRESS NOTES
Subjective     Bree Gage is a 40 y.o. female who presents for follow-up, with a history of basilar artery migraine, but his condition has worsened since late last year, but unfortunately, for unclear reasons.     HPI    When she was last seen in the office, the headaches had been increasing, Emgality was used with good benefit but she then developed alopecia.  Unfortunately off the drug, we had not been able to find a good substitute.  I had been reluctant to try another CGRP for fear of developing the same side effect.  Her hair has begun to grow back in at its full length.    In the meantime, the headaches are still menstrually associated, but they now start 3-4 days before flow and then continue through the full menstrual cycle, giving her a full 8 or 9 days of rather severe headache.  The takes another several days for things to tone down, she can have maybe 4 days in a monthly cycle without any headache at all, but then the pattern recurs.    The aura symptoms are now persisting even between the severe headaches themselves.  She is describing persistent photophobia, dull headache pain, language distortions and impaired concentration.  Even the visual impairments can fluctuate slightly.  She is more sensitive to moving around, dizziness can ensue more easily.    Review of the records indicates she has failed Topamax, Inderal, now Emgality, and all of the triptans.  Prednisone still provides some of the best benefit for her when she is in a bad cycle.  We have prescribed Nurtec rescue which also provided a 3 to 4-day benefit though the headaches then recurred as the drug wore off.    As part of her work-up, she did follow through with an ENT specialist, the work-up proved unremarkable, but it was then felt given some of her abnormal blood serologies that she is suffering from some type of autoimmune disease.  (This may explain why steroids seem to help the headache themselves.)  In any case, Cymbalta  "was increased to 60 mg by her PCP, this is also helped to a degree.  CT scan of the sinuses was done, revealing only bilateral maxillary sinus cysts.    Medical, surgical and family histories are reviewed, there are no new drug allergies.  She is on prednisone titration, Abilify, several inhalers, Cymbalta 60 mg daily, Mobic, Robaxin, Prilosec, Zofran, Abilify, Linzess, and Provigil.    Review of Systems   Constitutional: Positive for malaise/fatigue.   Eyes: Positive for photophobia.   Neurological: Positive for dizziness and headaches. Negative for focal weakness.   Psychiatric/Behavioral: Negative for depression and memory loss. The patient does not have insomnia.    All other systems reviewed and are negative.    Objective     /72   Pulse (!) 108   Temp 36.1 °C (97 °F)   Ht 1.568 m (5' 1.75\")   Wt 73 kg (161 lb) Comment: Patient stated  SpO2 98%   BMI 29.69 kg/m²      Physical Exam    She appears in no acute distress.  Vital signs are stable.  Still, there is sensitivity to light and sound, we turn the lights off for the evaluation itself.  She is cooperative.  There is no malar rash or jaw claudication.  There is some mild bitemporal tenderness, jaw tenderness, and occipital ridge tenderness.  There is no scalp hyperalgesia or allodynia.  The neck is supple, range of motion is full.  Cardiac evaluation reveals a regular rhythm.     Neurological Exam    Including mental status, cranial nerves, musculoskeletal, reflex, coordination, and sensory evaluations, her neurologic examination is normal.    Assessment & Plan     1. Basilar artery migraine, intractable  We will need to try a different medication regimen, she certainly is having a rough time without one.  She has tolerated Nurtec, now FDA indicated for migraine prophylaxis, we will start is a 75 mg dose every other day and see how things move along.  The next step would be either to substitute or add Norvasc depending on efficacy, or lack " thereof, respectively.  Side effects were reviewed for both these medicines.  We will communicate via [employer] about all of the above moving forwards, we will follow-up otherwise in about 4 or 5 months.    - Rimegepant Sulfate (NURTEC) 75 MG TABLET DISPERSIBLE; Take 1 Tablet by mouth every 48 hours.  Dispense: 15 Tablet; Refill: 5  - amLODIPine (NORVASC) 2.5 MG Tab; 1 tab bid for 2 weeks, then 2 tab bid  Dispense: 120 Tablet; Refill: 1    Time: 30 minutes spent face-to-face for exam, review, discussion, and education, of this 10 minutes was spent as a precharting exercise with records review, report reviewed, and subsequent evaluation summary and documentation, 20 minutes was spent face-to-face with the patient for exam, review and discussion, as well as counseling and coordinating care.

## 2021-09-20 NOTE — ASSESSMENT & PLAN NOTE
Generally stable at this time on Cymbalta and Abilify.  Still some depression related to chronic health issues

## 2021-09-20 NOTE — ASSESSMENT & PLAN NOTE
She continues with daily headache and frequent migraine episodes.  On preventative treatment with Aimovig, unsure if it is helping.  Still having fairly frequent episodes and missing work  When seen by the ENT they did not feel that sinus issues were contributing to her migraines.  CT of the sinuses showed retention cyst but no evidence of acute or chronic sinusitis.  She does have a persistent allergy issues which have been followed by Dr. Perdomo.  She is on an budesonide sinus irrigation  There was some discussion regarding possible underlying autoimmune issue.  She has had laboratory evaluation for this with negative LOYD, negative inflammatory markers.  She did have a low C4 and IgG, allergy and immunology did not feel that this was significant enough to be contributing   she has another appointment with neurology next week to further discuss migraine management

## 2021-09-20 NOTE — PROGRESS NOTES
Telemedicine: Established Patient   This evaluation was conducted via Zoom using secure and encrypted videoconferencing technology. The patient was in a private location in the state of [place].    The patient's identity was confirmed and verbal consent was obtained for this virtual visit.    Subjective:   CC:   Chief Complaint   Patient presents with   • Follow-Up     ENT visit 2 weeks ago   • Requesting Labs   • Referral Needed       Bree Gage is a 40 y.o. female presenting for evaluation and management of:    Chronic migraine  She continues with daily headache and frequent migraine episodes.  On preventative treatment with Aimovig, unsure if it is helping.  Still having fairly frequent episodes and missing work  When seen by the ENT they did not feel that sinus issues were contributing to her migraines.  CT of the sinuses showed retention cyst but no evidence of acute or chronic sinusitis.  She does have a persistent allergy issues which have been followed by Dr. Perdomo.  She is on an budesonide sinus irrigation  There was some discussion regarding possible underlying autoimmune issue.  She has had laboratory evaluation for this with negative LOYD, negative inflammatory markers.  She did have a low C4 and IgG, allergy and immunology did not feel that this was significant enough to be contributing   she has another appointment with neurology next week to further discuss migraine management    Moderate episode of recurrent major depressive disorder (HCC)  Generally stable at this time on Cymbalta and Abilify.  Still some depression related to chronic health issues        ROS      Allergies   Allergen Reactions   • Ciprofloxacin    • Compazine Unspecified     agitation   • Diclofenac      Upset     • Epinephrine      palpitations   • Latex        Current medicines (including changes today)  Current Outpatient Medications   Medication Sig Dispense Refill   • modafinil (PROVIGIL) 200 MG Tab Take 1 Tablet by  mouth every day for 45 doses. 45 Tablet 0   • DULoxetine (CYMBALTA) 60 MG Cap DR Particles delayed-release capsule Take 1 capsule by mouth every day. 90 capsule 1   • mupirocin (BACTROBAN) 2 % Ointment      • predniSONE (DELTASONE) 10 MG Tab TAKE 6 TABLETS BY MOUTH EVERY DAY FOR 2 DAYS AND THEN REDUCE BY 1 TAB EVERY OTHER DAY UNTIL GONE 45 tablet 0   • ARIPiprazole (ABILIFY) 5 MG tablet Take 1 tablet by mouth every day. 90 tablet 3   • ondansetron (ZOFRAN) 4 MG Tab tablet Take 1 Tab by mouth every four hours as needed for Nausea/Vomiting. 20 Tab 0   • albuterol 108 (90 Base) MCG/ACT Aero Soln inhalation aerosol Inhale 2 Puffs by mouth every four hours as needed for Shortness of Breath. 1 Each 5   • omeprazole (PRILOSEC) 20 MG delayed-release capsule Take 20 mg by mouth every day.     • acetaminophen (TYLENOL) 500 MG TABS Take 500-1,000 mg by mouth every 6 hours as needed. Up to 8 a day     • Rimegepant Sulfate (NURTEC) 75 MG TABLET DISPERSIBLE Take 1 Tablet by mouth every 48 hours. 15 Tablet 5   • amLODIPine (NORVASC) 2.5 MG Tab 1 tab bid for 2 weeks, then 2 tab bid 120 Tablet 1   • budesonide (PULMICORT) 0.5 MG/2ML Suspension Nasal Rinse 9/17/21     • linaCLOtide (LINZESS) 72 MCG Cap Take 1 capsule by mouth every day. (Patient taking differently: Take 1 Capsule by mouth every day. Has not started yet 9/17/21) 30 capsule 2   • meloxicam (MOBIC) 15 MG tablet Take 1 tablet by mouth 1 time a day as needed. 30 tablet 5   • methocarbamol (ROBAXIN) 500 MG Tab Take 500 mg by mouth 3 times a day as needed.       No current facility-administered medications for this visit.       Patient Active Problem List    Diagnosis Date Noted   • Sinus congestion 07/08/2021   • Neck pain on right side 02/12/2021   • Daytime somnolence 01/19/2021   • ROME on CPAP 10/23/2020   • Low vitamin D level 10/23/2020   • Obesity (BMI 30-39.9) 08/30/2019   • History of fracture of right ankle 06/13/2019   • Numbness of right hand 04/17/2019   •  Adjustment insomnia 10/05/2018   • Elevated glucose 06/22/2018   • Obesity, Class I, BMI 30.0-34.9 (see actual BMI) 05/23/2018   • Moderate episode of recurrent major depressive disorder (HCC) 05/23/2018   • Asthma in adult without complication 05/23/2018   • Basilar artery migraine, intractable 10/11/2017   • Chronic migraine 10/11/2017   • EEG abnormal 10/11/2017   • Hyperprolactinemia (HCC) 02/13/2013   • Fatigue 09/03/2009   • Preventative health care 09/03/2009   • IBS (irritable bowel syndrome)    • History of alcohol abuse    • History of methamphetamine abuse ([facility])    • History of hypothyroidism    • Hemorrhoids    • Rectal fissure    • Varicose veins    • Iron deficiency anemia    • Hyperlipidemia    • Low back pain    • EBV positive mononucleosis syndrome        Family History   Problem Relation Age of Onset   • Hypertension Mother    • Hyperlipidemia Mother    • Alcohol/Drug Mother    • Psychiatric Illness Mother    • Hypertension Father    • Hyperlipidemia Father    • Alcohol/Drug Father    • Psychiatric Illness Father    • Cancer Maternal Grandfather    • Heart Disease Maternal Grandfather    • Heart Disease Paternal Grandfather    • Alcohol/Drug Paternal Grandfather        She  has a past medical history of Alcohol addiction ([facility]), Allergy, Angina (last 2 years), Apnea, sleep, Arthritis (current), Back pain, Bronchitis, Chickenpox, Constipation, Daytime sleepiness, Depression, Dizziness, Drug addiction ([facility]), EBV positive mononucleosis syndrome, Eye pain, Fatigue, Fever, Fracture of finger, Frequent headaches, Gasping for breath, GERD (gastroesophageal reflux disease), Hemorrhoids, Hyperlipidemia, Hypothyroidism, IBS (irritable bowel syndrome), Indigestion, Influenza, Iron deficiency anemia, LBP (low back pain), Morning headache, Painful joint, Rectal fissure, Snoring, Sore muscles, Varicose veins, and Wears glasses. She also has no past medical history of Arrhythmia, Cancer ([facility]), CATARACT, Congestive  "heart failure (HCC), COPD, Diabetes, Dialysis, Glaucoma, Heart murmur, Heart valve disease, Hypertension, Jaundice, Myocardial infarct (HCC), Other specified symptom associated with female genital organs, Pacemaker, Personal history of venous thrombosis and embolism, Pneumonia, Renal disorder, Rheumatic fever, Seizure (HCC), Stroke (HCC), Unspecified hemorrhagic conditions, or Unspecified urinary incontinence.  She  has a past surgical history that includes tracheostomy (10/29/2009) and gastrostomy laparoscopic (10/29/2009).       Objective:   Pulse (!) 114   Temp 37.4 °C (99.4 °F) (Oral)   Ht 1.549 m (5' 1\")   Wt 73 kg (161 lb)   BMI 30.42 kg/m²     Physical Exam    Assessment and Plan:   The following treatment plan was discussed:     1. Chronic migraine  This is an ongoing issue, poorly controlled.  She is on treatment with Aimovig but still having frequent breakthrough episodes requiring missed work.  She has had some sinus pressure and congestion, was seen by ENT who did not feel that this was contributory to her migraine pattern  2. Moderate episode of recurrent major depressive disorder (HCC)  Generally stable  3. Low serum complement C4  Unclear clinical significance.  She has been seen by Dr. Saul who did not feel this warranted further work-up.  I am awaiting ENT notes for review  4. Low vitamin D level  Consistent with supplement  - VITAMIN D,25 HYDROXY; Future  5. Thyroid disorder screen  - TSH WITH REFLEX TO FT4; Future        Follow-up: Pending labs           "

## 2021-09-22 ENCOUNTER — HOSPITAL ENCOUNTER (OUTPATIENT)
Dept: LAB | Facility: MEDICAL CENTER | Age: 41
End: 2021-09-22
Attending: NURSE PRACTITIONER
Payer: COMMERCIAL

## 2021-09-22 ENCOUNTER — TELEPHONE (OUTPATIENT)
Dept: MEDICAL GROUP | Facility: MEDICAL CENTER | Age: 41
End: 2021-09-22

## 2021-09-22 DIAGNOSIS — Z13.0 SCREENING, ANEMIA, DEFICIENCY, IRON: ICD-10-CM

## 2021-09-22 DIAGNOSIS — R79.89 LOW VITAMIN D LEVEL: ICD-10-CM

## 2021-09-22 DIAGNOSIS — Z13.29 THYROID DISORDER SCREEN: ICD-10-CM

## 2021-09-22 LAB
25(OH)D3 SERPL-MCNC: 56 NG/ML (ref 30–100)
BASOPHILS # BLD AUTO: 0.5 % (ref 0–1.8)
BASOPHILS # BLD: 0.03 K/UL (ref 0–0.12)
EOSINOPHIL # BLD AUTO: 0.28 K/UL (ref 0–0.51)
EOSINOPHIL NFR BLD: 4.5 % (ref 0–6.9)
ERYTHROCYTE [DISTWIDTH] IN BLOOD BY AUTOMATED COUNT: 42.9 FL (ref 35.9–50)
HCT VFR BLD AUTO: 42.2 % (ref 37–47)
HGB BLD-MCNC: 13.4 G/DL (ref 12–16)
IMM GRANULOCYTES # BLD AUTO: 0.01 K/UL (ref 0–0.11)
IMM GRANULOCYTES NFR BLD AUTO: 0.2 % (ref 0–0.9)
IRON SATN MFR SERPL: 34 % (ref 15–55)
IRON SERPL-MCNC: 115 UG/DL (ref 40–170)
LYMPHOCYTES # BLD AUTO: 2.38 K/UL (ref 1–4.8)
LYMPHOCYTES NFR BLD: 38.5 % (ref 22–41)
MCH RBC QN AUTO: 29.1 PG (ref 27–33)
MCHC RBC AUTO-ENTMCNC: 31.8 G/DL (ref 33.6–35)
MCV RBC AUTO: 91.5 FL (ref 81.4–97.8)
MONOCYTES # BLD AUTO: 0.54 K/UL (ref 0–0.85)
MONOCYTES NFR BLD AUTO: 8.7 % (ref 0–13.4)
NEUTROPHILS # BLD AUTO: 2.94 K/UL (ref 2–7.15)
NEUTROPHILS NFR BLD: 47.6 % (ref 44–72)
NRBC # BLD AUTO: 0 K/UL
NRBC BLD-RTO: 0 /100 WBC
PLATELET # BLD AUTO: 300 K/UL (ref 164–446)
PMV BLD AUTO: 10.7 FL (ref 9–12.9)
RBC # BLD AUTO: 4.61 M/UL (ref 4.2–5.4)
TIBC SERPL-MCNC: 341 UG/DL (ref 250–450)
TSH SERPL DL<=0.005 MIU/L-ACNC: 1.94 UIU/ML (ref 0.38–5.33)
UIBC SERPL-MCNC: 226 UG/DL (ref 110–370)
WBC # BLD AUTO: 6.2 K/UL (ref 4.8–10.8)

## 2021-09-22 PROCEDURE — 36415 COLL VENOUS BLD VENIPUNCTURE: CPT

## 2021-09-22 PROCEDURE — 85025 COMPLETE CBC W/AUTO DIFF WBC: CPT

## 2021-09-22 PROCEDURE — 83540 ASSAY OF IRON: CPT

## 2021-09-22 PROCEDURE — 83550 IRON BINDING TEST: CPT

## 2021-09-22 PROCEDURE — 82306 VITAMIN D 25 HYDROXY: CPT

## 2021-09-22 PROCEDURE — 84443 ASSAY THYROID STIM HORMONE: CPT

## 2021-09-23 ENCOUNTER — HOSPITAL ENCOUNTER (EMERGENCY)
Facility: MEDICAL CENTER | Age: 41
End: 2021-09-23
Attending: EMERGENCY MEDICINE
Payer: COMMERCIAL

## 2021-09-23 ENCOUNTER — PATIENT MESSAGE (OUTPATIENT)
Dept: MEDICAL GROUP | Facility: MEDICAL CENTER | Age: 41
End: 2021-09-23

## 2021-09-23 ENCOUNTER — APPOINTMENT (OUTPATIENT)
Dept: RADIOLOGY | Facility: MEDICAL CENTER | Age: 41
End: 2021-09-23
Attending: OTOLARYNGOLOGY
Payer: COMMERCIAL

## 2021-09-23 VITALS
BODY MASS INDEX: 30.68 KG/M2 | HEIGHT: 61 IN | DIASTOLIC BLOOD PRESSURE: 73 MMHG | SYSTOLIC BLOOD PRESSURE: 108 MMHG | WEIGHT: 162.48 LBS | HEART RATE: 78 BPM | RESPIRATION RATE: 18 BRPM | TEMPERATURE: 97.3 F | OXYGEN SATURATION: 100 %

## 2021-09-23 DIAGNOSIS — R51.9 ACUTE NONINTRACTABLE HEADACHE, UNSPECIFIED HEADACHE TYPE: ICD-10-CM

## 2021-09-23 LAB
ALBUMIN SERPL BCP-MCNC: 4.6 G/DL (ref 3.2–4.9)
ALBUMIN/GLOB SERPL: 2.6 G/DL
ALP SERPL-CCNC: 53 U/L (ref 30–99)
ALT SERPL-CCNC: 7 U/L (ref 2–50)
ANION GAP SERPL CALC-SCNC: 12 MMOL/L (ref 7–16)
AST SERPL-CCNC: 17 U/L (ref 12–45)
BASOPHILS # BLD AUTO: 0.3 % (ref 0–1.8)
BASOPHILS # BLD: 0.02 K/UL (ref 0–0.12)
BILIRUB SERPL-MCNC: 0.3 MG/DL (ref 0.1–1.5)
BUN SERPL-MCNC: 8 MG/DL (ref 8–22)
CALCIUM SERPL-MCNC: 9.3 MG/DL (ref 8.4–10.2)
CHLORIDE SERPL-SCNC: 104 MMOL/L (ref 96–112)
CO2 SERPL-SCNC: 22 MMOL/L (ref 20–33)
CREAT SERPL-MCNC: 0.6 MG/DL (ref 0.5–1.4)
EKG IMPRESSION: NORMAL
EOSINOPHIL # BLD AUTO: 0.2 K/UL (ref 0–0.51)
EOSINOPHIL NFR BLD: 3.3 % (ref 0–6.9)
ERYTHROCYTE [DISTWIDTH] IN BLOOD BY AUTOMATED COUNT: 42.8 FL (ref 35.9–50)
GLOBULIN SER CALC-MCNC: 1.8 G/DL (ref 1.9–3.5)
GLUCOSE SERPL-MCNC: 109 MG/DL (ref 65–99)
HCG SERPL QL: NEGATIVE
HCT VFR BLD AUTO: 41.5 % (ref 37–47)
HGB BLD-MCNC: 13.2 G/DL (ref 12–16)
IMM GRANULOCYTES # BLD AUTO: 0.02 K/UL (ref 0–0.11)
IMM GRANULOCYTES NFR BLD AUTO: 0.3 % (ref 0–0.9)
LYMPHOCYTES # BLD AUTO: 1.89 K/UL (ref 1–4.8)
LYMPHOCYTES NFR BLD: 31.6 % (ref 22–41)
MCH RBC QN AUTO: 29.3 PG (ref 27–33)
MCHC RBC AUTO-ENTMCNC: 31.8 G/DL (ref 33.6–35)
MCV RBC AUTO: 92 FL (ref 81.4–97.8)
MONOCYTES # BLD AUTO: 0.44 K/UL (ref 0–0.85)
MONOCYTES NFR BLD AUTO: 7.3 % (ref 0–13.4)
NEUTROPHILS # BLD AUTO: 3.42 K/UL (ref 2–7.15)
NEUTROPHILS NFR BLD: 57.2 % (ref 44–72)
NRBC # BLD AUTO: 0 K/UL
NRBC BLD-RTO: 0 /100 WBC
PLATELET # BLD AUTO: 282 K/UL (ref 164–446)
PMV BLD AUTO: 10.3 FL (ref 9–12.9)
POTASSIUM SERPL-SCNC: 4 MMOL/L (ref 3.6–5.5)
PROT SERPL-MCNC: 6.4 G/DL (ref 6–8.2)
RBC # BLD AUTO: 4.51 M/UL (ref 4.2–5.4)
SODIUM SERPL-SCNC: 138 MMOL/L (ref 135–145)
TROPONIN T SERPL-MCNC: <6 NG/L (ref 6–19)
WBC # BLD AUTO: 6 K/UL (ref 4.8–10.8)

## 2021-09-23 PROCEDURE — 80053 COMPREHEN METABOLIC PANEL: CPT

## 2021-09-23 PROCEDURE — 84484 ASSAY OF TROPONIN QUANT: CPT

## 2021-09-23 PROCEDURE — 93005 ELECTROCARDIOGRAM TRACING: CPT | Performed by: EMERGENCY MEDICINE

## 2021-09-23 PROCEDURE — 700105 HCHG RX REV CODE 258: Performed by: EMERGENCY MEDICINE

## 2021-09-23 PROCEDURE — 96375 TX/PRO/DX INJ NEW DRUG ADDON: CPT

## 2021-09-23 PROCEDURE — 84703 CHORIONIC GONADOTROPIN ASSAY: CPT

## 2021-09-23 PROCEDURE — 96374 THER/PROPH/DIAG INJ IV PUSH: CPT

## 2021-09-23 PROCEDURE — 700111 HCHG RX REV CODE 636 W/ 250 OVERRIDE (IP): Performed by: EMERGENCY MEDICINE

## 2021-09-23 PROCEDURE — 85025 COMPLETE CBC W/AUTO DIFF WBC: CPT

## 2021-09-23 PROCEDURE — 93005 ELECTROCARDIOGRAM TRACING: CPT

## 2021-09-23 PROCEDURE — 99284 EMERGENCY DEPT VISIT MOD MDM: CPT

## 2021-09-23 RX ORDER — MODAFINIL 200 MG/1
100 TABLET ORAL 3 TIMES DAILY
Status: SHIPPED | COMMUNITY
End: 2021-10-11

## 2021-09-23 RX ORDER — KETOROLAC TROMETHAMINE 30 MG/ML
30 INJECTION, SOLUTION INTRAMUSCULAR; INTRAVENOUS ONCE
Status: COMPLETED | OUTPATIENT
Start: 2021-09-23 | End: 2021-09-23

## 2021-09-23 RX ORDER — ARIPIPRAZOLE 5 MG/1
2.5 TABLET ORAL
Status: SHIPPED | COMMUNITY
End: 2021-09-29 | Stop reason: SDUPTHER

## 2021-09-23 RX ORDER — DEXAMETHASONE SODIUM PHOSPHATE 4 MG/ML
10 INJECTION, SOLUTION INTRA-ARTICULAR; INTRALESIONAL; INTRAMUSCULAR; INTRAVENOUS; SOFT TISSUE ONCE
Status: COMPLETED | OUTPATIENT
Start: 2021-09-23 | End: 2021-09-23

## 2021-09-23 RX ORDER — AMLODIPINE BESYLATE 2.5 MG/1
2.5 TABLET ORAL DAILY
Status: SHIPPED | COMMUNITY
Start: 2021-09-17 | End: 2021-10-19

## 2021-09-23 RX ORDER — SODIUM CHLORIDE 9 MG/ML
1000 INJECTION, SOLUTION INTRAVENOUS ONCE
Status: COMPLETED | OUTPATIENT
Start: 2021-09-23 | End: 2021-09-23

## 2021-09-23 RX ADMIN — DEXAMETHASONE SODIUM PHOSPHATE 10 MG: 4 INJECTION, SOLUTION INTRA-ARTICULAR; INTRALESIONAL; INTRAMUSCULAR; INTRAVENOUS; SOFT TISSUE at 11:47

## 2021-09-23 RX ADMIN — SODIUM CHLORIDE 1000 ML: 9 INJECTION, SOLUTION INTRAVENOUS at 11:44

## 2021-09-23 RX ADMIN — KETOROLAC TROMETHAMINE 30 MG: 30 INJECTION, SOLUTION INTRAMUSCULAR at 12:46

## 2021-09-23 ASSESSMENT — FIBROSIS 4 INDEX: FIB4 SCORE: 0.84

## 2021-09-23 NOTE — ED PROVIDER NOTES
ED Provider Note    CHIEF COMPLAINT  Chief Complaint   Patient presents with   • Dizziness   • Headache       HPI  Bree Gage is a 40 y.o. female who presents with a headache.  The patient says she has had a headache for couple of months.  She was started on amlodipine for chronic headaches by Dr. Doshi on Thursday.  Subsequently the patient states she has been dizzy.  She states that palpitations and chest pressure.  She states the chest pressure is intermittent.  She is unaware of any exacerbating relieving factors.  She also has generalized malaise.  She does not have any focal weakness but does have generalized weakness.  She was seen by her primary care provider and sent in here for evaluation.  She states that she has an unknown autoimmune condition and they are concerned for possible inflammation causing her presenting symptoms.    REVIEW OF SYSTEMS  See HPI for further details. All other systems are negative.     PAST MEDICAL HISTORY  Past Medical History:   Diagnosis Date   • Alcohol addiction (HCC)    • Allergy    • Angina last 2 years    pt attributes to asthma   • Apnea, sleep    • Arthritis current    osteoarthritis   • Back pain    • Bronchitis     4-5x/year   • Chickenpox    • Constipation    • Daytime sleepiness    • Depression    • Dizziness    • Drug addiction (HCC)    • EBV positive mononucleosis syndrome    • Eye pain    • Fatigue    • Fever    • Fracture of finger     rt index x 3   • Frequent headaches    • Gasping for breath    • GERD (gastroesophageal reflux disease)    • Hemorrhoids    • Hyperlipidemia    • Hypothyroidism    • IBS (irritable bowel syndrome)    • Indigestion    • Influenza    • Iron deficiency anemia    • LBP (low back pain)     si joint epidural 2008   • Morning headache    • Painful joint    • Rectal fissure    • Snoring    • Sore muscles    • Varicose veins    • Wears glasses        FAMILY HISTORY  [unfilled]    SOCIAL HISTORY  Social History     Socioeconomic  History   • Marital status:      Spouse name: Not on file   • Number of children: Not on file   • Years of education: Not on file   • Highest education level: Associate degree: occupational, technical, or vocational program   Occupational History   • Not on file   Tobacco Use   • Smoking status: Former Smoker     Packs/day: 0.50     Years: 19.00     Pack years: 9.50     Types: Cigarettes     Start date: 1998     Quit date: 8/15/2015     Years since quittin.1   • Smokeless tobacco: Never Used   • Tobacco comment: Quit 2015   Vaping Use   • Vaping Use: Every day   Substance and Sexual Activity   • Alcohol use: Yes     Alcohol/week: 0.0 oz     Comment: one or two a week   • Drug use: Not Currently     Types: Methamphetamines     Comment: clean since   Rehab ,  speed   • Sexual activity: Yes     Partners: Male     Birth control/protection: Pill   Other Topics Concern   •  Service No   • Blood Transfusions Yes   • Caffeine Concern No   • Occupational Exposure Yes   • Hobby Hazards No   • Sleep Concern No   • Stress Concern No   • Weight Concern Yes   • Special Diet No   • Back Care No   • Exercise No   • Bike Helmet No   • Seat Belt Yes   • Self-Exams Yes   Social History Narrative   • Not on file     Social Determinants of Health     Financial Resource Strain: Medium Risk   • Difficulty of Paying Living Expenses: Somewhat hard   Food Insecurity: Food Insecurity Present   • Worried About Running Out of Food in the Last Year: Sometimes true   • Ran Out of Food in the Last Year: Sometimes true   Transportation Needs: No Transportation Needs   • Lack of Transportation (Medical): No   • Lack of Transportation (Non-Medical): No   Physical Activity: Insufficiently Active   • Days of Exercise per Week: 2 days   • Minutes of Exercise per Session: 20 min   Stress: Stress Concern Present   • Feeling of Stress : Very much   Social Connections: Unknown   • Frequency of Communication with  "Friends and Family: Once a week   • Frequency of Social Gatherings with Friends and Family: Never   • Attends Hinduism Services: Patient refused   • Active Member of Clubs or Organizations: Yes   • Attends Club or Organization Meetings: Never   • Marital Status: Living with partner   Intimate Partner Violence:    • Fear of Current or Ex-Partner:    • Emotionally Abused:    • Physically Abused:    • Sexually Abused:        SURGICAL HISTORY  Past Surgical History:   Procedure Laterality Date   • TRACHEOSTOMY  10/29/2009    Performed by NIDHI LARA at SURGERY [facility]   • GASTROSTOMY LAPAROSCOPIC  10/29/2009    Performed by NIDHI LARA at SURGERY [facility]       CURRENT MEDICATIONS  Home Medications    **Home medications have not yet been reviewed for this encounter**         ALLERGIES  Allergies   Allergen Reactions   • Ciprofloxacin    • Compazine Unspecified     agitation   • Diclofenac      Upset     • Epinephrine      palpitations   • Latex        PHYSICAL EXAM  VITAL SIGNS: /76   Pulse 98   Temp 36.5 °C (97.7 °F) (Temporal)   Resp 17   Ht 1.549 m (5' 1\")   Wt 73.7 kg (162 lb 7.7 oz)   SpO2 99%   BMI 30.70 kg/m²       Constitutional: Well developed, Well nourished, No acute distress, Non-toxic appearance.   HENT: Normocephalic, Atraumatic, Bilateral external ears normal, Oropharynx moist, No oral exudates, Nose normal.   Eyes: PERRLA, EOMI, Conjunctiva normal, No discharge.   Neck: Normal range of motion, No tenderness, Supple, No stridor.   Lymphatic: No lymphadenopathy noted.   Cardiovascular: Normal heart rate, Normal rhythm, No murmurs, No rubs, No gallops.   Thorax & Lungs: Normal breath sounds, No respiratory distress, No wheezing, No chest tenderness.   Abdomen: Bowel sounds normal, Soft, No tenderness, No masses, No pulsatile masses.   Skin: Warm, Dry, No erythema, No rash.   Back: No tenderness, No CVA tenderness.   Extremities: Intact distal pulses, No edema, No " tenderness, No cyanosis, No clubbing.   Neurologic: Alert & oriented x 3, Normal motor function, Normal sensory function, No focal deficits noted.   Psychiatric: Affect normal, Judgment normal, Mood normal.     Results for orders placed or performed during the hospital encounter of 09/23/21   CBC w/ Differential   Result Value Ref Range    WBC 6.0 4.8 - 10.8 K/uL    RBC 4.51 4.20 - 5.40 M/uL    Hemoglobin 13.2 12.0 - 16.0 g/dL    Hematocrit 41.5 37.0 - 47.0 %    MCV 92.0 81.4 - 97.8 fL    MCH 29.3 27.0 - 33.0 pg    MCHC 31.8 (L) 33.6 - 35.0 g/dL    RDW 42.8 35.9 - 50.0 fL    Platelet Count 282 164 - 446 K/uL    MPV 10.3 9.0 - 12.9 fL    Neutrophils-Polys 57.20 44.00 - 72.00 %    Lymphocytes 31.60 22.00 - 41.00 %    Monocytes 7.30 0.00 - 13.40 %    Eosinophils 3.30 0.00 - 6.90 %    Basophils 0.30 0.00 - 1.80 %    Immature Granulocytes 0.30 0.00 - 0.90 %    Nucleated RBC 0.00 /100 WBC    Neutrophils (Absolute) 3.42 2.00 - 7.15 K/uL    Lymphs (Absolute) 1.89 1.00 - 4.80 K/uL    Monos (Absolute) 0.44 0.00 - 0.85 K/uL    Eos (Absolute) 0.20 0.00 - 0.51 K/uL    Baso (Absolute) 0.02 0.00 - 0.12 K/uL    Immature Granulocytes (abs) 0.02 0.00 - 0.11 K/uL    NRBC (Absolute) 0.00 K/uL   Complete Metabolic Panel (CMP)   Result Value Ref Range    Sodium 138 135 - 145 mmol/L    Potassium 4.0 3.6 - 5.5 mmol/L    Chloride 104 96 - 112 mmol/L    Co2 22 20 - 33 mmol/L    Anion Gap 12.0 7.0 - 16.0    Glucose 109 (H) 65 - 99 mg/dL    Bun 8 8 - 22 mg/dL    Creatinine 0.60 0.50 - 1.40 mg/dL    Calcium 9.3 8.4 - 10.2 mg/dL    AST(SGOT) 17 12 - 45 U/L    Total Bilirubin 0.3 0.1 - 1.5 mg/dL    Total Protein 6.4 6.0 - 8.2 g/dL   Troponin STAT   Result Value Ref Range    Troponin T <6 6 - 19 ng/L   BETA-HCG QUALITATIVE SERUM   Result Value Ref Range    Beta-Hcg Qualitative Serum Negative Negative   ESTIMATED GFR   Result Value Ref Range    GFR If African American >60 >60 mL/min/1.73 m 2    GFR If Non African American >60 >60 mL/min/1.73 m 2    EKG   Result Value Ref Range    Report       [facility] Emergency Dept.    Test Date:  2021  Pt Name:    DARWIN DOUGLAS                Department: E[facility]  MRN:        [id number]                      Room:  Gender:     Female                       Technician: [id number]  :        [id number]                   Requested By:ER TRIAGE PROTOCOL  Order #:    [id number]                    Reading MD: SEBAS GALE MD    Measurements  Intervals                                Axis  Rate:       74                           P:          72  NV:         129                          QRS:        55  QRSD:       84                           T:          32  QT:         390  QTc:        433    Interpretive Statements  Twelve-lead EKG shows a normal sinus rhythm with a ventricular to 74, normal  QRS, normal intervals, no ST segment elevation or depression, normal T waves.  No arrhythmic change  Electronically Signed On 2021 12:50:31 PDT by SEBAS GALE MD           COURSE & MEDICAL DECISION MAKING  Pertinent Labs & Imaging studies reviewed. (See chart for details)  This a 40-year-old female who presents with multiple complaints.  As for her headache she does not have any meningeal findings.  She has a history of headaches in the past and there is no concerning signs or symptoms that would warrant repeat imaging.  She states she is also some palpitations and chest pressure with dizziness.  EKG does not show any arrhythmic change and hemodynamically she is been stable throughout her stay.  She states that she feels like she is dehydrated and is asking for intravenous fluids.  She did receive a liter of fluid intravenously as well as Toradol and Decadron.  On repeat exam she does feel better.  She continues be neurologically intact.  She will be discharged with clear instructions to follow-up with her neurologist for further outpatient management.  She will return if she is acutely worse.  Of note  laboratory analysis does not show any evidence of anemia nor metabolic disturbance to cause cardiac irritability and her dizziness.  The chest pressure the patient has what is couple of days ago and therefore the troponin rules out ischemia and EKG does not show any concerning ST segment elevation or depression.    FINAL IMPRESSION  1.  Headache  2.  Dizziness  3.  Chest pain  4.  Generalized weakness      Disposition  Patient will be discharged in stable condition         Electronically signed by: Michael Painter M.D., 9/23/2021 11:13 AM

## 2021-09-23 NOTE — PATIENT COMMUNICATION
Attempt # 2 to reach out to Dr Fofana Record Department. Left another message to fax information and our number to call back.

## 2021-09-23 NOTE — DISCHARGE INSTRUCTIONS
Tinea your current regimen and follow-up with your neurologist as well as your primary care provider as discussed.  Return if you are acutely worse

## 2021-09-23 NOTE — TELEPHONE ENCOUNTER
----- Message from Bree Gage sent at 9/23/2021  4:59 AM PDT -----  Regarding: Hypotension   Good morning. I did go ahead and start a very low dose of amlodipine (1.25 mg qd for 3 days then 2.5 mg qd) on Friday as mentioned previously along with nurtec. My migraine has not subsided, I am weak & confused, I am more tired than usual and I have been checking my blood pressure since Monday. Slight palpitations and honestly a little bit of chest pain. I know it's a very low dose, but I am staying home as every time I move around,  I am terribly dizzy. I do not know if this is Urgent care or ER. Attached is home BP.     Please advise.  & tyia.

## 2021-09-23 NOTE — ED NOTES
Med rec complete per pt.       Medication Sig   • amLODIPine (NORVASC) 2.5 MG Tab Take 2.5 mg by mouth every day.   • ARIPiprazole (ABILIFY) 5 MG tablet Take 2.5 mg by mouth at bedtime.   • modafinil (PROVIGIL) 200 MG Tab Take 100 mg by mouth 3 times a day. 0500, [id number], and 1500   • VITAMIN D PO Take 1 Capsule by mouth every day.   • diphenhydrAMINE HCl (BENADRYL PO) Take 1 Tablet by mouth 1 time a day as needed (for migraines).   • Rimegepant Sulfate (NURTEC) 75 MG TABLET DISPERSIBLE Take 1 Tablet by mouth every 48 hours.   • DULoxetine (CYMBALTA) 60 MG Cap DR Particles delayed-release capsule Take 1 capsule by mouth every day.   • budesonide (PULMICORT) 0.5 MG/2ML Suspension 500 mcg 1 time a day as needed. **Nasal Rinse 9/17/21**   • mupirocin (BACTROBAN) 2 % Ointment Apply 1 Application to affected nostril(s) every day.   • linaCLOtide (LINZESS) 72 MCG Cap Take 1 capsule by mouth every day.   • meloxicam (MOBIC) 15 MG tablet Take 1 tablet by mouth 1 time a day as needed.   • methocarbamol (ROBAXIN) 500 MG Tab Take 500 mg by mouth 3 times a day as needed.   • ondansetron (ZOFRAN) 4 MG Tab tablet Take 1 Tab by mouth every four hours as needed for Nausea/Vomiting.   • albuterol 108 (90 Base) MCG/ACT Aero Soln inhalation aerosol Inhale 2 Puffs by mouth every four hours as needed for Shortness of Breath.   • omeprazole (PRILOSEC) 20 MG delayed-release capsule Take 20 mg by mouth 1 time a day as needed. Indications: Heartburn   • acetaminophen (TYLENOL) 500 MG TABS Take 500-1,000 mg by mouth every 6 hours as needed.

## 2021-09-23 NOTE — PATIENT COMMUNICATION
Tried to reach out to patient in regard to her mychart message. LVM stating to call back to further discuss.

## 2021-09-23 NOTE — ED TRIAGE NOTES
Pt amb to triage c/o dizziness and headache x2wks. Pt stated on amlodipine x1wk ago, pt states s/s worsening

## 2021-09-30 ENCOUNTER — HOSPITAL ENCOUNTER (OUTPATIENT)
Dept: RADIOLOGY | Facility: MEDICAL CENTER | Age: 41
End: 2021-09-30
Attending: OTOLARYNGOLOGY
Payer: COMMERCIAL

## 2021-09-30 DIAGNOSIS — J37.0 CHRONIC LARYNGITIS: ICD-10-CM

## 2021-09-30 DIAGNOSIS — R13.10 PROBLEMS WITH SWALLOWING AND MASTICATION: ICD-10-CM

## 2021-09-30 PROCEDURE — 70490 CT SOFT TISSUE NECK W/O DYE: CPT

## 2021-10-07 ENCOUNTER — OFFICE VISIT (OUTPATIENT)
Dept: MEDICAL GROUP | Facility: MEDICAL CENTER | Age: 41
End: 2021-10-07
Payer: COMMERCIAL

## 2021-10-07 VITALS
OXYGEN SATURATION: 100 % | DIASTOLIC BLOOD PRESSURE: 94 MMHG | HEIGHT: 61 IN | SYSTOLIC BLOOD PRESSURE: 136 MMHG | BODY MASS INDEX: 30.05 KG/M2 | HEART RATE: 95 BPM | WEIGHT: 159.17 LBS | TEMPERATURE: 98.6 F | RESPIRATION RATE: 18 BRPM

## 2021-10-07 DIAGNOSIS — R07.89 CHEST PRESSURE: ICD-10-CM

## 2021-10-07 DIAGNOSIS — F33.1 MODERATE EPISODE OF RECURRENT MAJOR DEPRESSIVE DISORDER (HCC): ICD-10-CM

## 2021-10-07 DIAGNOSIS — Z23 NEED FOR VACCINATION: ICD-10-CM

## 2021-10-07 DIAGNOSIS — G47.33 OSA ON CPAP: Chronic | ICD-10-CM

## 2021-10-07 DIAGNOSIS — R00.2 POUNDING HEARTBEAT: ICD-10-CM

## 2021-10-07 PROCEDURE — 99214 OFFICE O/P EST MOD 30 MIN: CPT | Mod: 25 | Performed by: NURSE PRACTITIONER

## 2021-10-07 PROCEDURE — 90686 IIV4 VACC NO PRSV 0.5 ML IM: CPT | Performed by: NURSE PRACTITIONER

## 2021-10-07 PROCEDURE — 90471 IMMUNIZATION ADMIN: CPT | Performed by: NURSE PRACTITIONER

## 2021-10-07 ASSESSMENT — FIBROSIS 4 INDEX: FIB4 SCORE: 0.89

## 2021-10-07 NOTE — TELEPHONE ENCOUNTER
Have we ever prescribed this med? Yes.  If yes, what date? 02/14/2020    Last OV: 03/05/2020    Next OV: 07/09/2020    DX: Hypersomnolence (G47.10)    Medications: Modafinil 200mg   Yes

## 2021-10-07 NOTE — ASSESSMENT & PLAN NOTE
Reports that change to Cymbalta has worked out very well for her.  Her mood is stable, she has been able to lose a bit of weight, energy is improved

## 2021-10-08 DIAGNOSIS — R40.0 DAYTIME SOMNOLENCE: ICD-10-CM

## 2021-10-08 DIAGNOSIS — G47.33 OSA ON CPAP: ICD-10-CM

## 2021-10-11 RX ORDER — MODAFINIL 200 MG/1
TABLET ORAL
Qty: 60 TABLET | Refills: 0 | Status: SHIPPED | OUTPATIENT
Start: 2021-10-11 | End: 2021-12-10

## 2021-10-11 NOTE — TELEPHONE ENCOUNTER
Have we ever prescribed this med? Yes.  If yes, what date? 9/8/2021    Last OV: 7/29/2021 JUSTYNA CHEEMA     Next OV: 12/2/2021 JUSTYNA CHEEMA     DX: Daytime somnolence (R40.0); ROME on CPAP (G47.33 , Z99.89)    Medications: modafinil (PROVIGIL) 200 MG Tab

## 2021-10-13 NOTE — PROGRESS NOTES
Subjective:     Chief Complaint   Patient presents with   • Palpitations     Bree Gage is a 40 y.o. female here today to follow up on:    ROME on CPAP  Has not been as consistent with CPAP use recently     Moderate episode of recurrent major depressive disorder (HCC)  Reports that change to Cymbalta has worked out very well for her.  Her mood is stable, she has been able to lose a bit of weight, energy is improved      Chronic migraine  Longstanding issue followed by neurology.  She was recently given trial of amlodipine for her migraine prevention, took this only for a short period of time and then had developed palpitation, chest pressure, dizziness and was seen in ER.  Notes and testing reviewed.  She has since discontinued amlodipine.  However, she presents today with concerns still of intermittent palpitations, pounding sensation of her heart, episodes of tachycardia.  These seem to occur sporadically, sometimes with activity but not always.  She does feel that her heart rate increases with position change.  She sometimes has some associated dizziness  No acute chest pain at this time.  No episodes of syncope, shortness of breath.  No recent illness including fever, chills, cough  Blood pressure is borderline in clinic today at 136/94.  She does not have history of hypertension, last visit in September blood pressure was 120/72       Current medicines (including changes today)  Current Outpatient Medications   Medication Sig Dispense Refill   • modafinil (PROVIGIL) 200 MG Tab TAKE 1 TAB BY MOUTH EVERY DAY FOR 60 DAYS. 60 Tablet 0   • ARIPiprazole (ABILIFY) 5 MG tablet Take 0.5 Tablets by mouth at bedtime. 30 Tablet 5   • amLODIPine (NORVASC) 2.5 MG Tab Take 2.5 mg by mouth every day.     • VITAMIN D PO Take 1 Capsule by mouth every day.     • diphenhydrAMINE HCl (BENADRYL PO) Take 1 Tablet by mouth 1 time a day as needed (for migraines).     • Rimegepant Sulfate (NURTEC) 75 MG TABLET DISPERSIBLE Take 1  Tablet by mouth every 48 hours. 15 Tablet 5   • DULoxetine (CYMBALTA) 60 MG Cap DR Particles delayed-release capsule Take 1 capsule by mouth every day. 90 capsule 1   • budesonide (PULMICORT) 0.5 MG/2ML Suspension 500 mcg 1 time a day as needed. **Nasal Rinse 9/17/21**     • mupirocin (BACTROBAN) 2 % Ointment Apply 1 Application to affected nostril(s) every day.     • linaCLOtide (LINZESS) 72 MCG Cap Take 1 capsule by mouth every day. 30 capsule 2   • meloxicam (MOBIC) 15 MG tablet Take 1 tablet by mouth 1 time a day as needed. 30 tablet 5   • methocarbamol (ROBAXIN) 500 MG Tab Take 500 mg by mouth 3 times a day as needed.     • ondansetron (ZOFRAN) 4 MG Tab tablet Take 1 Tab by mouth every four hours as needed for Nausea/Vomiting. 20 Tab 0   • albuterol 108 (90 Base) MCG/ACT Aero Soln inhalation aerosol Inhale 2 Puffs by mouth every four hours as needed for Shortness of Breath. 1 Each 5   • omeprazole (PRILOSEC) 20 MG delayed-release capsule Take 20 mg by mouth 1 time a day as needed. Indications: Heartburn     • acetaminophen (TYLENOL) 500 MG TABS Take 500-1,000 mg by mouth every 6 hours as needed.       No current facility-administered medications for this visit.     She  has a past medical history of Alcohol addiction ([facility]), Allergy, Angina (last 2 years), Apnea, sleep, Arthritis (current), Back pain, Bronchitis, Chickenpox, Constipation, Daytime sleepiness, Depression, Dizziness, Drug addiction ([facility]), EBV positive mononucleosis syndrome, Eye pain, Fatigue, Fever, Fracture of finger, Frequent headaches, Gasping for breath, GERD (gastroesophageal reflux disease), Hemorrhoids, Hyperlipidemia, Hypothyroidism, IBS (irritable bowel syndrome), Indigestion, Influenza, Iron deficiency anemia, LBP (low back pain), Morning headache, Painful joint, Rectal fissure, Snoring, Sore muscles, Varicose veins, and Wears glasses. She also has no past medical history of Arrhythmia, Cancer ([facility]), CATARACT, Congestive heart failure  "(HCC), COPD, Diabetes, Dialysis, Glaucoma, Heart murmur, Heart valve disease, Hypertension, Jaundice, Myocardial infarct (HCC), Other specified symptom associated with female genital organs, Pacemaker, Personal history of venous thrombosis and embolism, Pneumonia, Renal disorder, Rheumatic fever, Seizure (HCC), Stroke (HCC), Unspecified hemorrhagic conditions, or Unspecified urinary incontinence.    ROS included above     Objective:     /94   Pulse 95   Temp 37 °C (98.6 °F) (Temporal)   Resp 18   Ht 1.549 m (5' 1\")   Wt 72.2 kg (159 lb 2.8 oz)   SpO2 100%  Body mass index is 30.08 kg/m².     Physical Exam:  General: Alert, oriented in no acute distress.  Eye contact is good, speech is normal, affect calm  Lungs: clear to auscultation bilaterally, normal effort, no wheeze/ rhonchi/ rales.  CV: regular rate and rhythm, S1, S2, no murmur  Abdomen: soft, nontender  Ext: no edema, color normal, vascularity normal, temperature normal    Assessment and Plan:   The following treatment plan was discussed  1. Pounding heartbeat   new problem over the last few weeks.  Frequently feels that her heart is pounding harder than would be normal, having intermittent chest pressure and episodes of tachycardia.  She had been given trial of amlodipine to treat her chronic migraines, had a reaction to this and was evaluated in ER.  Testing and notes reviewed including EKG which was normal.  She was given IV fluids and improved at the time.  She appears in no acute distress today.  Cardiovascular exam normal other than slightly elevated blood pressure. Orthostatic BP without significant deviation. We will further evaluate, consult with cardiology.  Encouraged to maintain adequate hydration  She has not been consistent with her CPAP-advised consistent use  EC-ECHOCARDIOGRAM COMPLETE W/O CONT    REFERRAL TO CARDIOLOGY   2. Chest pressure  REFERRAL TO CARDIOLOGY   3. ROME on CPAP   encouraged more consistent CPAP use     4. " Moderate episode of recurrent major depressive disorder (HCC)   reports that change to Cymbalta has worked well for her, no present concerns with mood   5. Need for vaccination  I have placed the below orders and discussed them with an approved delegating provider. The MA is performing the below orders under the direction of Dr. Braun  INFLUENZA VACCINE QUAD INJ (PF)       Followup: pending test         Please note that this dictation was created using voice recognition software. I have worked with consultants from the vendor as well as technical experts from [facility] to optimize the interface. I have made every reasonable attempt to correct obvious errors, but I expect that there are errors of grammar and possibly content that I did not discover before finalizing the note.

## 2021-10-13 NOTE — ASSESSMENT & PLAN NOTE
Longstanding issue followed by neurology.  She was recently given trial of amlodipine for her migraine prevention, took this only for a short period of time and then had developed palpitation, chest pressure, dizziness and was seen in ER.  Notes and testing reviewed.  She has since discontinued amlodipine.  However, she presents today with concerns still of intermittent palpitations, pounding sensation of her heart, episodes of tachycardia.  These seem to occur sporadically, sometimes with activity but not always.  She does feel that her heart rate increases with position change.  She sometimes has some associated dizziness  No acute chest pain at this time.  No episodes of syncope, shortness of breath.  No recent illness including fever, chills, cough  Blood pressure is borderline in clinic today at 136/94.  She does not have history of hypertension, last visit in September blood pressure was 120/72

## 2021-10-14 ENCOUNTER — HOSPITAL ENCOUNTER (OUTPATIENT)
Facility: MEDICAL CENTER | Age: 41
End: 2021-10-14
Attending: NURSE PRACTITIONER
Payer: COMMERCIAL

## 2021-10-14 ENCOUNTER — OFFICE VISIT (OUTPATIENT)
Dept: MEDICAL GROUP | Facility: MEDICAL CENTER | Age: 41
End: 2021-10-14
Payer: COMMERCIAL

## 2021-10-14 VITALS
HEIGHT: 61 IN | SYSTOLIC BLOOD PRESSURE: 122 MMHG | WEIGHT: 169.09 LBS | RESPIRATION RATE: 18 BRPM | TEMPERATURE: 98.2 F | BODY MASS INDEX: 31.93 KG/M2 | DIASTOLIC BLOOD PRESSURE: 88 MMHG | OXYGEN SATURATION: 99 % | HEART RATE: 72 BPM

## 2021-10-14 DIAGNOSIS — B34.9 VIRAL ILLNESS: ICD-10-CM

## 2021-10-14 LAB
COVID ORDER STATUS COVID19: NORMAL
FLUAV+FLUBV AG SPEC QL IA: NEGATIVE
INT CON NEG: NEGATIVE
INT CON POS: POSITIVE

## 2021-10-14 PROCEDURE — U0005 INFEC AGEN DETEC AMPLI PROBE: HCPCS

## 2021-10-14 PROCEDURE — 87804 INFLUENZA ASSAY W/OPTIC: CPT | Performed by: NURSE PRACTITIONER

## 2021-10-14 PROCEDURE — U0003 INFECTIOUS AGENT DETECTION BY NUCLEIC ACID (DNA OR RNA); SEVERE ACUTE RESPIRATORY SYNDROME CORONAVIRUS 2 (SARS-COV-2) (CORONAVIRUS DISEASE [COVID-19]), AMPLIFIED PROBE TECHNIQUE, MAKING USE OF HIGH THROUGHPUT TECHNOLOGIES AS DESCRIBED BY CMS-2020-01-R: HCPCS

## 2021-10-14 PROCEDURE — 99213 OFFICE O/P EST LOW 20 MIN: CPT | Performed by: NURSE PRACTITIONER

## 2021-10-14 ASSESSMENT — FIBROSIS 4 INDEX: FIB4 SCORE: 0.89

## 2021-10-14 NOTE — PROGRESS NOTES
Subjective:     Chief Complaint   Patient presents with   • Cough   • Dizziness   • Pharyngitis     Bree Gage is a 40 y.o. female established patient here for evaluation of cough, malaise, sinus pressure, sore throat.  She was seen last week with concerns of pounding heartbeat after having been given trial of amlodipine.  This symptom is unchanged and we are awaiting further evaluation and cardiology consult.  Blood pressure is reasonable in the office today, 120/88  2 days ago she developed sore throat, dizziness, sinus pressure and cough.  Cough is dry and nonproductive.  She has used her inhaler with some relief.  She feels that her glands are swollen in the throat.  She has a migraine today, but this is a frequent issue for her.  She is taking guaifenesin without much relief.  She does work in a medical office, unsure if she has had any direct exposure to COVID-19.  Yu has been sick with respiratory illness, he tested negative for Covid  She has had some slight shortness of breath and feels her heart rate is elevating with minimal activity.  No chest pain, hemoptysis, fever, nausea, vomiting    No problem-specific Assessment & Plan notes found for this encounter.       Current medicines (including changes today)  Current Outpatient Medications   Medication Sig Dispense Refill   • modafinil (PROVIGIL) 200 MG Tab TAKE 1 TAB BY MOUTH EVERY DAY FOR 60 DAYS. 60 Tablet 0   • ARIPiprazole (ABILIFY) 5 MG tablet Take 0.5 Tablets by mouth at bedtime. 30 Tablet 5   • amLODIPine (NORVASC) 2.5 MG Tab Take 2.5 mg by mouth every day.     • VITAMIN D PO Take 1 Capsule by mouth every day.     • diphenhydrAMINE HCl (BENADRYL PO) Take 1 Tablet by mouth 1 time a day as needed (for migraines).     • Rimegepant Sulfate (NURTEC) 75 MG TABLET DISPERSIBLE Take 1 Tablet by mouth every 48 hours. 15 Tablet 5   • DULoxetine (CYMBALTA) 60 MG Cap DR Particles delayed-release capsule Take 1 capsule by mouth every day. 90 capsule  1   • budesonide (PULMICORT) 0.5 MG/2ML Suspension 500 mcg 1 time a day as needed. **Nasal Rinse 9/17/21**     • mupirocin (BACTROBAN) 2 % Ointment Apply 1 Application to affected nostril(s) every day.     • linaCLOtide (LINZESS) 72 MCG Cap Take 1 capsule by mouth every day. 30 capsule 2   • meloxicam (MOBIC) 15 MG tablet Take 1 tablet by mouth 1 time a day as needed. 30 tablet 5   • methocarbamol (ROBAXIN) 500 MG Tab Take 500 mg by mouth 3 times a day as needed.     • ondansetron (ZOFRAN) 4 MG Tab tablet Take 1 Tab by mouth every four hours as needed for Nausea/Vomiting. 20 Tab 0   • albuterol 108 (90 Base) MCG/ACT Aero Soln inhalation aerosol Inhale 2 Puffs by mouth every four hours as needed for Shortness of Breath. 1 Each 5   • omeprazole (PRILOSEC) 20 MG delayed-release capsule Take 20 mg by mouth 1 time a day as needed. Indications: Heartburn     • acetaminophen (TYLENOL) 500 MG TABS Take 500-1,000 mg by mouth every 6 hours as needed.       No current facility-administered medications for this visit.     She  has a past medical history of Alcohol addiction ([facility]), Allergy, Angina (last 2 years), Apnea, sleep, Arthritis (current), Back pain, Bronchitis, Chickenpox, Constipation, Daytime sleepiness, Depression, Dizziness, Drug addiction ([facility]), EBV positive mononucleosis syndrome, Eye pain, Fatigue, Fever, Fracture of finger, Frequent headaches, Gasping for breath, GERD (gastroesophageal reflux disease), Hemorrhoids, Hyperlipidemia, Hypothyroidism, IBS (irritable bowel syndrome), Indigestion, Influenza, Iron deficiency anemia, LBP (low back pain), Morning headache, Painful joint, Rectal fissure, Snoring, Sore muscles, Varicose veins, and Wears glasses. She also has no past medical history of Arrhythmia, Cancer ([facility]), CATARACT, Congestive heart failure ([facility]), COPD, Diabetes, Dialysis, Glaucoma, Heart murmur, Heart valve disease, Hypertension, Jaundice, Myocardial infarct ([facility]), Other specified symptom associated  "with female genital organs, Pacemaker, Personal history of venous thrombosis and embolism, Pneumonia, Renal disorder, Rheumatic fever, Seizure (HCC), Stroke (HCC), Unspecified hemorrhagic conditions, or Unspecified urinary incontinence.    ROS included above     Objective:     /88 (BP Location: Right arm, Patient Position: Sitting, BP Cuff Size: Adult)   Pulse 72   Temp 36.8 °C (98.2 °F) (Temporal)   Resp 18   Ht 1.549 m (5' 1\")   Wt 76.7 kg (169 lb 1.5 oz)   SpO2 99%  Body mass index is 31.95 kg/m².     Physical Exam:  General: Alert, oriented in no acute distress.  Eye contact is good, speech is normal, affect calm  HEENT: Posterior oropharynx with mild erythema, no lesions or exudate.  TMs gray with good landmarks bilaterally.  Mild bilateral tonsillar lymphadenopathy, mobile, nontender  Lungs: clear to auscultation bilaterally, normal effort, no wheeze/ rhonchi/ rales.  CV: regular rate and rhythm, S1, S2, no murmur  Ext: no edema, color normal, vascularity normal, temperature normal    Assessment and Plan:   The following treatment plan was discussed   1. Viral illness   2 days of sore throat, dry nonproductive cough.  Lungs are clear on exam, well oxygenated on room air, no acute distress.  We will screen for Covid and influenza.  Encouraged to continue symptomatic treatment, suggested Mucinex D, rest, fluids, OTC analgesic as needed  SARS-CoV-2 PCR (24 hour In-House): Collect NP swab in VTM    POCT Influenza A/B       Followup: pending tests         Please note that this dictation was created using voice recognition software. I have worked with consultants from the vendor as well as technical experts from [employer] to optimize the interface. I have made every reasonable attempt to correct obvious errors, but I expect that there are errors of grammar and possibly content that I did not discover before finalizing the note.       "

## 2021-10-15 ENCOUNTER — PATIENT MESSAGE (OUTPATIENT)
Dept: MEDICAL GROUP | Facility: MEDICAL CENTER | Age: 41
End: 2021-10-15

## 2021-10-15 LAB
SARS-COV-2 RNA RESP QL NAA+PROBE: NOTDETECTED
SPECIMEN SOURCE: NORMAL

## 2021-10-18 RX ORDER — AMOXICILLIN AND CLAVULANATE POTASSIUM 875; 125 MG/1; MG/1
1 TABLET, FILM COATED ORAL 2 TIMES DAILY
Qty: 20 TABLET | Refills: 0 | Status: SHIPPED | OUTPATIENT
Start: 2021-10-18 | End: 2021-11-17

## 2021-10-19 ENCOUNTER — OFFICE VISIT (OUTPATIENT)
Dept: CARDIOLOGY | Facility: MEDICAL CENTER | Age: 41
End: 2021-10-19
Attending: NURSE PRACTITIONER
Payer: COMMERCIAL

## 2021-10-19 VITALS
BODY MASS INDEX: 31.72 KG/M2 | WEIGHT: 168 LBS | DIASTOLIC BLOOD PRESSURE: 66 MMHG | HEIGHT: 61 IN | HEART RATE: 98 BPM | OXYGEN SATURATION: 97 % | RESPIRATION RATE: 16 BRPM | SYSTOLIC BLOOD PRESSURE: 104 MMHG

## 2021-10-19 DIAGNOSIS — R00.2 PALPITATIONS: ICD-10-CM

## 2021-10-19 PROCEDURE — 99204 OFFICE O/P NEW MOD 45 MIN: CPT | Performed by: INTERNAL MEDICINE

## 2021-10-19 RX ORDER — FAMOTIDINE 40 MG/1
TABLET, FILM COATED ORAL
COMMUNITY
Start: 2021-10-11

## 2021-10-19 ASSESSMENT — FIBROSIS 4 INDEX: FIB4 SCORE: 0.89

## 2021-10-19 NOTE — PROGRESS NOTES
"CARDIOLOGY NEW PATIENT CONSULTATION    PCP: Jacque Song A.P.R.N.    1. Palpitations      Bree Gage is having ongoing symptoms of palpitations, orthostasis as well as vertigo.  With her history of migraine headaches I wonder if some of the symptoms are related to neurologic disease.  Nonetheless, I think it is important to exclude important cardiovascular disease and did arrange a Zio patch as well as echocardiogram.  I also provided general advised to increase hydration, exercise regularly and recommended that she have total abstinence from tobacco products.    Follow up: to be determined after testing is complete    Chief Complaint   Patient presents with   • Hyperlipidemia     NP   • Chest Pressure       History: Bree Gage is a 40 y.o. female with history of pneumonia in 2009 and requiring intubation and tracheostomy, longstanding migraine headaches and mental health disease presenting for evaluation of feeling faint as well as palpitations and chest discomfort.  Symptoms been present for the past several years and are getting worse after she was prescribed amlodipine as a migraine prophylaxis recently.  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  Most often symptoms are triggered by being upright.  She did have an ENT evaluation which was unrevealing.    She is moving to [place] in the near future.  She continues to vape and anticipates quitting nicotine by the first of the year.    ROS:   All other systems reviewed and negative except as per the HPI    PE:  /66 (BP Location: Left arm, Patient Position: Sitting, BP Cuff Size: Adult)   Pulse 98   Resp 16   Ht 1.549 m (5' 1\")   Wt 76.2 kg (168 lb)   SpO2 97%   BMI 31.74 kg/m²   Gen: no acute distress  HEENT: Symmetric face. Anicteric sclerae. Moist mucus membranes  NECK: No JVD. No lymphadenopathy  CARDIAC: Regular, Normal S1, S2, No murmur  VASCULATURE: carotids are normal " bilaterally without bruit  RESP: Clear to auscultation bilaterally  ABD: Soft, non-tender, non-distended  EXT: No edema, no clubbing or cyanosis  SKIN: Warm and dry  NEURO: No gross deficits  PSYCH: Appropriate affect, participates in conversation    The ASCVD Risk score (Monroe KARINE Jr, et al., 2013) failed to calculate.    Past Medical History:   Diagnosis Date   • Alcohol addiction (HCC)    • Allergy    • Angina last 2 years    pt attributes to asthma   • Apnea, sleep    • Arthritis current    osteoarthritis   • Back pain    • Bronchitis     4-5x/year   • Chickenpox    • Constipation    • Daytime sleepiness    • Depression    • Dizziness    • Drug addiction (HCC)    • EBV positive mononucleosis syndrome    • Eye pain    • Fatigue    • Fever    • Fracture of finger     rt index x 3   • Frequent headaches    • Gasping for breath    • GERD (gastroesophageal reflux disease)    • Hemorrhoids    • Hyperlipidemia    • Hypothyroidism    • IBS (irritable bowel syndrome)    • Indigestion    • Influenza    • Iron deficiency anemia    • LBP (low back pain)     si joint epidural 2008   • Morning headache    • Painful joint    • Rectal fissure    • Snoring    • Sore muscles    • Varicose veins    • Wears glasses      Past Surgical History:   Procedure Laterality Date   • TRACHEOSTOMY  10/29/2009    Performed by NIDHI LARA at SURGERY [facility]   • GASTROSTOMY LAPAROSCOPIC  10/29/2009    Performed by NIDHI LARA at SURGERY [facility]     Allergies   Allergen Reactions   • Ciprofloxacin    • Compazine Unspecified     agitation   • Diclofenac      Upset     • Epinephrine      palpitations   • Latex      Outpatient Encounter Medications as of 10/19/2021   Medication Sig Dispense Refill   • famotidine (PEPCID) 40 MG Tab      • amoxicillin-clavulanate (AUGMENTIN) 875-125 MG Tab Take 1 Tablet by mouth 2 times a day. 20 Tablet 0   • modafinil (PROVIGIL) 200 MG Tab TAKE 1 TAB BY MOUTH EVERY DAY FOR 60 DAYS. 60 Tablet  0   • ARIPiprazole (ABILIFY) 5 MG tablet Take 0.5 Tablets by mouth at bedtime. 30 Tablet 5   • VITAMIN D PO Take 1 Capsule by mouth every day.     • diphenhydrAMINE HCl (BENADRYL PO) Take 1 Tablet by mouth 1 time a day as needed (for migraines).     • Rimegepant Sulfate (NURTEC) 75 MG TABLET DISPERSIBLE Take 1 Tablet by mouth every 48 hours. 15 Tablet 5   • DULoxetine (CYMBALTA) 60 MG Cap DR Particles delayed-release capsule Take 1 capsule by mouth every day. 90 capsule 1   • budesonide (PULMICORT) 0.5 MG/2ML Suspension 500 mcg 1 time a day as needed. **Nasal Rinse 9/17/21**     • mupirocin (BACTROBAN) 2 % Ointment Apply 1 Application to affected nostril(s) every day.     • linaCLOtide (LINZESS) 72 MCG Cap Take 1 capsule by mouth every day. 30 capsule 2   • meloxicam (MOBIC) 15 MG tablet Take 1 tablet by mouth 1 time a day as needed. 30 tablet 5   • methocarbamol (ROBAXIN) 500 MG Tab Take 500 mg by mouth 3 times a day as needed.     • ondansetron (ZOFRAN) 4 MG Tab tablet Take 1 Tab by mouth every four hours as needed for Nausea/Vomiting. 20 Tab 0   • albuterol 108 (90 Base) MCG/ACT Aero Soln inhalation aerosol Inhale 2 Puffs by mouth every four hours as needed for Shortness of Breath. 1 Each 5   • omeprazole (PRILOSEC) 20 MG delayed-release capsule Take 20 mg by mouth 1 time a day as needed. Indications: Heartburn     • acetaminophen (TYLENOL) 500 MG TABS Take 500-1,000 mg by mouth every 6 hours as needed.     • [DISCONTINUED] amLODIPine (NORVASC) 2.5 MG Tab Take 2.5 mg by mouth every day.       No facility-administered encounter medications on file as of 10/19/2021.     Social History     Socioeconomic History   • Marital status:      Spouse name: Not on file   • Number of children: Not on file   • Years of education: Not on file   • Highest education level: Associate degree: occupational, technical, or vocational program   Occupational History   • Not on file   Tobacco Use   • Smoking status: Former Smoker      Packs/day: 0.50     Years: 19.00     Pack years: 9.50     Types: Cigarettes     Start date: 1998     Quit date: 8/15/2015     Years since quittin.1   • Smokeless tobacco: Never Used   • Tobacco comment: Quit 2015   Vaping Use   • Vaping Use: Every day   Substance and Sexual Activity   • Alcohol use: Yes     Alcohol/week: 0.0 oz     Comment: one or two a week   • Drug use: Not Currently     Types: Methamphetamines     Comment: clean since   Rehab ,  speed   • Sexual activity: Yes     Partners: Male     Birth control/protection: Pill   Other Topics Concern   •  Service No   • Blood Transfusions Yes   • Caffeine Concern No   • Occupational Exposure Yes   • Hobby Hazards No   • Sleep Concern No   • Stress Concern No   • Weight Concern Yes   • Special Diet No   • Back Care No   • Exercise No   • Bike Helmet No   • Seat Belt Yes   • Self-Exams Yes   Social History Narrative   • Not on file     Social Determinants of Health     Financial Resource Strain: Medium Risk   • Difficulty of Paying Living Expenses: Somewhat hard   Food Insecurity: Food Insecurity Present   • Worried About Running Out of Food in the Last Year: Sometimes true   • Ran Out of Food in the Last Year: Sometimes true   Transportation Needs: No Transportation Needs   • Lack of Transportation (Medical): No   • Lack of Transportation (Non-Medical): No   Physical Activity: Insufficiently Active   • Days of Exercise per Week: 2 days   • Minutes of Exercise per Session: 20 min   Stress: Stress Concern Present   • Feeling of Stress : Very much   Social Connections: Unknown   • Frequency of Communication with Friends and Family: Once a week   • Frequency of Social Gatherings with Friends and Family: Never   • Attends [employer]ism Services: Patient refused   • Active Member of Clubs or Organizations: Yes   • Attends Club or Organization Meetings: Never   • Marital Status: Living with partner   Intimate Partner Violence:    • Fear  of Current or Ex-Partner:    • Emotionally Abused:    • Physically Abused:    • Sexually Abused:      Family History   Problem Relation Age of Onset   • Hypertension Mother    • Hyperlipidemia Mother    • Alcohol/Drug Mother    • Psychiatric Illness Mother    • Hypertension Father    • Hyperlipidemia Father    • Alcohol/Drug Father    • Psychiatric Illness Father    • Cancer Maternal Grandfather    • Heart Disease Maternal Grandfather    • Heart Disease Paternal Grandfather    • Alcohol/Drug Paternal Grandfather          Studies  Lab Results   Component Value Date/Time    CHOLSTRLTOT 169 10/02/2020 06:46 AM    LDL 93 10/02/2020 06:46 AM    HDL 62 10/02/2020 06:46 AM    TRIGLYCERIDE 70 10/02/2020 06:46 AM       Lab Results   Component Value Date/Time    SODIUM 138 09/23/2021 11:40 AM    POTASSIUM 4.0 09/23/2021 11:40 AM    CHLORIDE 104 09/23/2021 11:40 AM    CO2 22 09/23/2021 11:40 AM    GLUCOSE 109 (H) 09/23/2021 11:40 AM    BUN 8 09/23/2021 11:40 AM    CREATININE 0.60 09/23/2021 11:40 AM    CREATININE 0.63 10/07/2010 02:20 PM    BUNCREATRAT 29 (H) 10/07/2010 02:20 PM    GLOMRATE >59 10/07/2010 02:20 PM     Lab Results   Component Value Date/Time    ALKPHOSPHAT 53 09/23/2021 11:40 AM    ASTSGOT 17 09/23/2021 11:40 AM    ALTSGPT 7 09/23/2021 11:40 AM    TBILIRUBIN 0.3 09/23/2021 11:40 AM        For this encounter I reviewed the following medical records BMP and CBC     For this encounter I directly reviewed ECG tracings.  Normal ECG.

## 2021-10-21 ENCOUNTER — PATIENT MESSAGE (OUTPATIENT)
Dept: MEDICAL GROUP | Facility: MEDICAL CENTER | Age: 41
End: 2021-10-21

## 2021-10-21 DIAGNOSIS — F40.9 PHOBIA, UNSPECIFIED TYPE: ICD-10-CM

## 2021-10-22 ENCOUNTER — PATIENT MESSAGE (OUTPATIENT)
Dept: MEDICAL GROUP | Facility: MEDICAL CENTER | Age: 41
End: 2021-10-22

## 2021-10-22 RX ORDER — DIAZEPAM 5 MG/1
TABLET ORAL
Qty: 1 TABLET | Refills: 0 | Status: SHIPPED | OUTPATIENT
Start: 2021-10-22 | End: 2021-10-28

## 2021-10-26 ENCOUNTER — EH NON-PROVIDER (OUTPATIENT)
Dept: OCCUPATIONAL MEDICINE | Facility: CLINIC | Age: 41
End: 2021-10-26
Payer: COMMERCIAL

## 2021-10-26 ENCOUNTER — HOSPITAL ENCOUNTER (OUTPATIENT)
Facility: MEDICAL CENTER | Age: 41
End: 2021-10-26
Attending: PREVENTIVE MEDICINE
Payer: COMMERCIAL

## 2021-10-26 DIAGNOSIS — Z11.59 ENCOUNTER FOR SCREENING FOR OTHER VIRAL DISEASES: Primary | ICD-10-CM

## 2021-10-26 PROCEDURE — U0003 INFECTIOUS AGENT DETECTION BY NUCLEIC ACID (DNA OR RNA); SEVERE ACUTE RESPIRATORY SYNDROME CORONAVIRUS 2 (SARS-COV-2) (CORONAVIRUS DISEASE [COVID-19]), AMPLIFIED PROBE TECHNIQUE, MAKING USE OF HIGH THROUGHPUT TECHNOLOGIES AS DESCRIBED BY CMS-2020-01-R: HCPCS | Performed by: PREVENTIVE MEDICINE

## 2021-10-26 PROCEDURE — U0005 INFEC AGEN DETEC AMPLI PROBE: HCPCS

## 2021-10-26 PROCEDURE — U0003 INFECTIOUS AGENT DETECTION BY NUCLEIC ACID (DNA OR RNA); SEVERE ACUTE RESPIRATORY SYNDROME CORONAVIRUS 2 (SARS-COV-2) (CORONAVIRUS DISEASE [COVID-19]), AMPLIFIED PROBE TECHNIQUE, MAKING USE OF HIGH THROUGHPUT TECHNOLOGIES AS DESCRIBED BY CMS-2020-01-R: HCPCS

## 2021-10-27 ENCOUNTER — NON-PROVIDER VISIT (OUTPATIENT)
Dept: CARDIOLOGY | Facility: MEDICAL CENTER | Age: 41
End: 2021-10-27
Payer: COMMERCIAL

## 2021-10-27 ENCOUNTER — TELEPHONE (OUTPATIENT)
Dept: CARDIOLOGY | Facility: MEDICAL CENTER | Age: 41
End: 2021-10-27

## 2021-10-27 DIAGNOSIS — R00.0 SINUS TACHYCARDIA: ICD-10-CM

## 2021-10-27 DIAGNOSIS — Z11.59 ENCOUNTER FOR SCREENING FOR OTHER VIRAL DISEASES: ICD-10-CM

## 2021-10-27 DIAGNOSIS — I49.1 PREMATURE ATRIAL CONTRACTIONS: ICD-10-CM

## 2021-10-27 NOTE — TELEPHONE ENCOUNTER
Home enrollment completed for the 14 day Zio AT (Live) [facility] Heart monitoring program per Aric Clay MD.  Monitor to be mailed to patient by [email or web address].  >Pending Baseline.  >Pending EOS.

## 2021-10-28 ENCOUNTER — TELEPHONE (OUTPATIENT)
Dept: OCCUPATIONAL MEDICINE | Facility: CLINIC | Age: 41
End: 2021-10-28

## 2021-10-28 NOTE — TELEPHONE ENCOUNTER
No pt identifier on VM. Did not leave a detailed message. Return # [phone]      Calling to notify the pt. of their  NEGATIVE COVID-19 test results.     With respect to COVID-19 monitoring, the Pt. is cleared to return to work on next scheduled shift.

## 2021-11-04 ENCOUNTER — HOSPITAL ENCOUNTER (OUTPATIENT)
Dept: CARDIOLOGY | Facility: MEDICAL CENTER | Age: 41
End: 2021-11-04
Attending: INTERNAL MEDICINE
Payer: COMMERCIAL

## 2021-11-04 DIAGNOSIS — R00.2 PALPITATIONS: ICD-10-CM

## 2021-11-04 LAB
LV EJECT FRACT  99904: 65
LV EJECT FRACT MOD 2C 99903: 71.91
LV EJECT FRACT MOD 4C 99902: 70.31
LV EJECT FRACT MOD BP 99901: 70.61

## 2021-11-04 PROCEDURE — 93306 TTE W/DOPPLER COMPLETE: CPT | Mod: 26 | Performed by: INTERNAL MEDICINE

## 2021-11-04 PROCEDURE — 93306 TTE W/DOPPLER COMPLETE: CPT

## 2021-11-08 ENCOUNTER — PATIENT MESSAGE (OUTPATIENT)
Dept: CARDIOLOGY | Facility: MEDICAL CENTER | Age: 41
End: 2021-11-08

## 2021-11-08 DIAGNOSIS — Q21.16 ASD (ATRIAL SEPTAL DEFECT), SINUS VENOSUS DEFECT: ICD-10-CM

## 2021-11-09 NOTE — PATIENT COMMUNICATION
The echocardiogram overall looks quite good but there is a hint of a possible abnormal connection between the right and the left side of the heart called an atrial septal defect.  I recommend completing a cardiac MRI which will better assess whether this defect is present and quantify a shunt fraction if present.  If this condition is identified, it is something that I would likely recommend periodic echocardiogram imaging of in the future.   Written by Aric Clay M.D. on 11/8/2021  4:02 PM PST  Seen by patient Bree Schaeffer Too on 11/8/2021  4:52 PM

## 2021-11-15 ENCOUNTER — HOSPITAL ENCOUNTER (OUTPATIENT)
Dept: LAB | Facility: MEDICAL CENTER | Age: 41
End: 2021-11-15
Attending: NURSE PRACTITIONER
Payer: COMMERCIAL

## 2021-11-15 PROCEDURE — 86003 ALLG SPEC IGE CRUDE XTRC EA: CPT

## 2021-11-15 PROCEDURE — 36415 COLL VENOUS BLD VENIPUNCTURE: CPT

## 2021-11-17 ENCOUNTER — OFFICE VISIT (OUTPATIENT)
Dept: SLEEP MEDICINE | Facility: MEDICAL CENTER | Age: 41
End: 2021-11-17
Payer: COMMERCIAL

## 2021-11-17 VITALS
SYSTOLIC BLOOD PRESSURE: 118 MMHG | DIASTOLIC BLOOD PRESSURE: 80 MMHG | OXYGEN SATURATION: 97 % | RESPIRATION RATE: 16 BRPM | WEIGHT: 160.7 LBS | HEIGHT: 61 IN | BODY MASS INDEX: 30.34 KG/M2 | HEART RATE: 95 BPM

## 2021-11-17 DIAGNOSIS — R40.0 DAYTIME SOMNOLENCE: ICD-10-CM

## 2021-11-17 DIAGNOSIS — J90 PLEURAL EFFUSION: ICD-10-CM

## 2021-11-17 DIAGNOSIS — G47.33 OSA ON CPAP: ICD-10-CM

## 2021-11-17 PROCEDURE — 99214 OFFICE O/P EST MOD 30 MIN: CPT | Performed by: NURSE PRACTITIONER

## 2021-11-17 ASSESSMENT — FIBROSIS 4 INDEX: FIB4 SCORE: 0.89

## 2021-11-17 NOTE — PROGRESS NOTES
Chief Complaint   Patient presents with   • Follow-Up     ROME       HPI:  Bree Gage is a 40 y.o. year old female here today for follow-up on ROME.  Last seen 7/29/2021 by ANETTE Corcoran.    Compliance report was reviewed and does show improvement in use of CPAP with 77% use with an average time of 4 hours and 57 minutes and a resultant AHI of 0.7.  Patient states that she would like to get fitted for a new mask as she feels irritated by the current one and she keeps pulling it off in her sleep.     Patient also states that she has been having shortness of breath and cough since last year when she had an ankle surgery.  She is being evaluated by cardiology and currently wearing a Zio patch.  Most recent echocardiogram showed a pleural effusion.  Patient denies any new or worsening shortness of breath or dyspnea.    Echocardiogram (11/4/2021):  Normal LV and RV size and systolic function  Possible flow visualized in the roof of the left atrial wall- which may be consistent with artifact vs sinus venosus ASD Apparent left pleural effusion- correlate with alternate imaging.     Sleep history:  PSG 10/19/17 noted AHI 7.2/hr and O2 mira 91%. MSLT noted sleep onset latency of 9min and 26 second with no sleep onset REM periods. She was treated with Provigil with improvement in symptoms.  PSG 11/3/19 noted AHI 7.6/hr and supine AHi 23.5/hr with O2 mira 89%. She was started on APAP 5-12cm with improvement in symptoms. Current device obtained 11/2019.  CNOX on Pap 1/28/2021 noted mean SPO2 of 89% and less than 88% for 9 minutes of the night.  O2 mira appears to be artifact.  Continue CPAP at current pressure.     ROS: As per HPI and otherwise negative if not stated.    Past Medical History:   Diagnosis Date   • Alcohol addiction (HCC)    • Allergy    • Angina last 2 years    pt attributes to asthma   • Apnea, sleep    • Arthritis current    osteoarthritis   • Back pain    • Bronchitis     4-5x/year   •  "Chickenpox    • Constipation    • Daytime sleepiness    • Depression    • Dizziness    • Drug addiction (HCC)    • EBV positive mononucleosis syndrome    • Eye pain    • Fatigue    • Fever    • Fracture of finger     rt index x 3   • Frequent headaches    • Gasping for breath    • GERD (gastroesophageal reflux disease)    • Hemorrhoids    • Hyperlipidemia    • Hypothyroidism    • IBS (irritable bowel syndrome)    • Indigestion    • Influenza    • Iron deficiency anemia    • LBP (low back pain)     si joint epidural 2008   • Morning headache    • Painful joint    • Rectal fissure    • Snoring    • Sore muscles    • Varicose veins    • Wears glasses        Past Surgical History:   Procedure Laterality Date   • TRACHEOSTOMY  10/29/2009    Performed by NIDHI LARA at SURGERY [facility]   • GASTROSTOMY LAPAROSCOPIC  10/29/2009    Performed by NIDHI LARA at SURGERY [facility]       Family History   Problem Relation Age of Onset   • Hypertension Mother    • Hyperlipidemia Mother    • Alcohol/Drug Mother    • Psychiatric Illness Mother    • Hypertension Father    • Hyperlipidemia Father    • Alcohol/Drug Father    • Psychiatric Illness Father    • Cancer Maternal Grandfather    • Heart Disease Maternal Grandfather    • Heart Disease Paternal Grandfather    • Alcohol/Drug Paternal Grandfather        Allergies as of 11/17/2021 - Reviewed 11/17/2021   Allergen Reaction Noted   • Ciprofloxacin  11/08/2010   • Compazine Unspecified 01/09/2018   • Diclofenac  09/03/2009   • Epinephrine  09/03/2009   • Latex  09/03/2009        Vitals:  /80 (BP Location: Left arm, Patient Position: Sitting, BP Cuff Size: Adult)   Pulse 95   Resp 16   Ht 1.549 m (5' 1\")   Wt 72.9 kg (160 lb 11.2 oz)   SpO2 97%     Current medications as of today   Current Outpatient Medications   Medication Sig Dispense Refill   • famotidine (PEPCID) 40 MG Tab      • modafinil (PROVIGIL) 200 MG Tab TAKE 1 TAB BY MOUTH EVERY DAY FOR " 60 DAYS. 60 Tablet 0   • ARIPiprazole (ABILIFY) 5 MG tablet Take 0.5 Tablets by mouth at bedtime. 30 Tablet 5   • VITAMIN D PO Take 1 Capsule by mouth every day.     • diphenhydrAMINE HCl (BENADRYL PO) Take 1 Tablet by mouth 1 time a day as needed (for migraines).     • Rimegepant Sulfate (NURTEC) 75 MG TABLET DISPERSIBLE Take 1 Tablet by mouth every 48 hours. 15 Tablet 5   • DULoxetine (CYMBALTA) 60 MG Cap DR Particles delayed-release capsule Take 1 capsule by mouth every day. 90 capsule 1   • budesonide (PULMICORT) 0.5 MG/2ML Suspension 500 mcg 1 time a day as needed. **Nasal Rinse 9/17/21**     • mupirocin (BACTROBAN) 2 % Ointment Apply 1 Application to affected nostril(s) every day.     • linaCLOtide (LINZESS) 72 MCG Cap Take 1 capsule by mouth every day. 30 capsule 2   • meloxicam (MOBIC) 15 MG tablet Take 1 tablet by mouth 1 time a day as needed. 30 tablet 5   • methocarbamol (ROBAXIN) 500 MG Tab Take 500 mg by mouth 3 times a day as needed.     • ondansetron (ZOFRAN) 4 MG Tab tablet Take 1 Tab by mouth every four hours as needed for Nausea/Vomiting. 20 Tab 0   • albuterol 108 (90 Base) MCG/ACT Aero Soln inhalation aerosol Inhale 2 Puffs by mouth every four hours as needed for Shortness of Breath. 1 Each 5   • acetaminophen (TYLENOL) 500 MG TABS Take 500-1,000 mg by mouth every 6 hours as needed.     • omeprazole (PRILOSEC) 20 MG delayed-release capsule Take 20 mg by mouth 1 time a day as needed. Indications: Heartburn       No current facility-administered medications for this visit.         Physical Exam:   Gen:           Alert and oriented, No apparent distress. Mood and affect appropriate, normal interaction with examiner.  Eyes:          PERRL, EOM intact, sclere white, conjunctive moist.  Ears:          Not examined.   Hearing:     Grossly intact.  Nose:          Normal, no lesions or deformities.  Dentition:    Good dentition.  Oropharynx:   Tongue normal, posterior pharynx without erythema or  exudate.  Neck:        Supple, trachea midline, no masses.  Respiratory Effort: No intercostal retractions or use of accessory muscles.   Lung Auscultation:      Clear to auscultation bilaterally; no rales, rhonchi or wheezing.  CV:            Regular rate and rhythm. No murmurs, rubs or gallops.  Abd:           Not examined.   Lymphadenopathy: Not examined.  Gait and Station: Normal.  Digits and Nails: No clubbing, cyanosis, petechiae, or nodes.   Cranial Nerves: II-XII grossly intact.  Skin:        No rashes, lesions or ulcers noted.               Ext:           No cyanosis or edema.      Assessment:  1. Pleural effusion  DX-CHEST-2 VIEWS    DME Mask Fitting   2. ROME on CPAP     3. Sinus congestion         Immunizations:    Flu: 10/7/2021  Pneumovax 23: 11/8/2010  COVID-19: 3/18/2021, 5/27/2021    Plan:  1.  Patient presents out of concern for pleural effusion that was found on echocardiogram.  She states that her breathing has not worsened or there is no increased dyspnea with exertion.  She does complain of shortness of breath but as she has an ongoing evaluation with cardiology currently wearing a Zio patch.  Order was placed today for chest x-ray to be completed once Zio patch was removed.  Bilateral breath sounds today were clear to auscultation.  Unfortunately patient is moving to [place] in 5 days, if there is anything pertinent that needs to be discussed before then we will call patient to discuss results.  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  She states she will follow up in that clinic for further evaluation of pleural effusion.  2.  Compliance was reviewed and does show adequate control of ROME.  Patient just states that she is not tolerating mask currently and rips the mask off during her sleep.  Order was placed today for mask fitting session.  Patient will follow up as needed as she is moving to [place] on 5 days.  3.  Patient currently taking Provigil 200 mg for excessive  daytime somnolence.  Most recent prescription was filled on 10/11/2021 for 60-day supply.  Patient was notified that we would be unable to write for or fill prescriptions in [place] as our prescriptive authority does not prostate lines.  She was instructed to get established with a provider in [place] otherwise she would need to travel back to [place] for appointment.  Reach out via [employer] with any questions or concerns before next appointment.    Please note that this dictation was created using voice recognition software. I have made every reasonable attempt to correct obvious errors, but it is possible there are errors of grammar and possibly content that I did not discover before finalizing the note.

## 2021-11-19 LAB
CORN IGE QN: <0.1 KU/L
DEPRECATED MISC ALLERGEN IGE RAST QL: NORMAL
OAT IGE QN: <0.1 KU/L

## 2021-11-23 DIAGNOSIS — R00.2 PALPITATIONS: ICD-10-CM

## 2021-11-23 PROCEDURE — 93268 ECG RECORD/REVIEW: CPT | Performed by: INTERNAL MEDICINE

## 2021-11-24 ENCOUNTER — PATIENT MESSAGE (OUTPATIENT)
Dept: CARDIOLOGY | Facility: MEDICAL CENTER | Age: 41
End: 2021-11-24

## 2021-11-24 NOTE — PATIENT COMMUNICATION
The echocardiogram overall looks quite good but there is a hint of a possible abnormal connection between the right and the left side of the heart called an atrial septal defect.  I recommend completing a cardiac MRI which will better assess whether this defect is present and quantify a shunt fraction if present.  If this condition is identified, it is something that I would likely recommend periodic echocardiogram imaging of in the future.   Written by Aric Clay M.D. on 11/8/2021  4:02 PM PST  Seen by patient Bree Gage on 11/20/2021 11:43 AM        The monitor is reassuring with no significant arrhythmia during the symptom episodes. Occasionally, I did see the heart rate  a touch- I suspect this is the effect of the symptom and not the cause. I continue to recommend good hydration, regular exercise and tobacco abstinence.   Written by Aric Clay M.D. on 11/23/2021  3:45 PM PST  Seen by patient Bree Gage on 11/24/2021  3:33 AM

## 2021-11-29 NOTE — PATIENT COMMUNICATION
You  Aric Clay M.D. 5 days ago       Cardiac MRI was recommended after echo results. Should she still plan on trying to get this done when she moves to [place] soon/establishes with cardiology there?   Thanks!      Aric Clay M.D.  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     Thanks   BE

## 2022-06-03 NOTE — TELEPHONE ENCOUNTER
Emergency Department TeleTriage Encounter Note      CHIEF COMPLAINT    Chief Complaint   Patient presents with    Cough     Cough, fever, and diarrhea x 2 days.   Vomiting and chest pain started yesterday.       VITAL SIGNS   Initial Vitals [06/03/22 1011]   BP Pulse Resp Temp SpO2   113/82 92 15 98.4 °F (36.9 °C) 100 %      MAP       --            ALLERGIES    Review of patient's allergies indicates:  No Known Allergies    PROVIDER TRIAGE NOTE  HPI: Geraldine Ward, a 19 y.o. female presents to the ED for evaluation cough, congestion with chest pain and diarrhea for 2 days.  Denies any blood in stool or abdominal pain.  No treatments tried.           Constitutional: Vital signs   Vitals:    06/03/22 1011   BP: 113/82   Pulse: 92   Resp: 15   Temp: 98.4 °F (36.9 °C)     , well nourished, well developed, appearing stated age, NAD   HEENT: NCAT, symmetrical lids, No obvious facial deformity.  Normal phonation. Normal Conjunctiva, Gross EOMs intact   Neck: NAROM   Respiratory: Normal effort.  No obvious use of accessory muscles   Musculoskeletal: Moved upper extremities well   Neuro: Alert, answers questions appropriately    Psych: appropriate mood and affect          ORDERS  Labs Reviewed - No data to display    ED Orders (720h ago, onward)    None            Virtual Visit Note: The provider triage portion of this emergency department evaluation and documentation was performed via [employer], a HIPAA-compliant telemedicine application, in concert with a tele-presenter in the room. A face to face patient evaluation with one of my colleagues will occur once the patient is placed in an emergency department room.      DISCLAIMER: This note was prepared with [employer] voice recognition transcription software. Garbled syntax, mangled pronouns, and other bizarre constructions may be attributed to that software system.   Was the patient seen in the last year in this department? Yes    Does patient have an active prescription for medications requested? No     Received Request Via: Pharmacy

## 2022-11-22 ENCOUNTER — TELEPHONE (OUTPATIENT)
Dept: MEDICAL GROUP | Facility: MEDICAL CENTER | Age: 42
End: 2022-11-22
Payer: COMMERCIAL